# Patient Record
Sex: FEMALE | Race: WHITE | Employment: FULL TIME | URBAN - METROPOLITAN AREA
[De-identification: names, ages, dates, MRNs, and addresses within clinical notes are randomized per-mention and may not be internally consistent; named-entity substitution may affect disease eponyms.]

---

## 2017-09-24 ENCOUNTER — HOSPITAL ENCOUNTER (EMERGENCY)
Facility: CLINIC | Age: 32
Discharge: HOME | End: 2017-09-24
Attending: SPECIALIST
Payer: COMMERCIAL

## 2017-09-24 VITALS
WEIGHT: 208 LBS | BODY MASS INDEX: 38.28 KG/M2 | OXYGEN SATURATION: 97 % | HEART RATE: 91 BPM | HEIGHT: 62 IN | SYSTOLIC BLOOD PRESSURE: 125 MMHG | RESPIRATION RATE: 18 BRPM | DIASTOLIC BLOOD PRESSURE: 88 MMHG | TEMPERATURE: 98.3 F

## 2017-09-24 DIAGNOSIS — K02.9 DENTAL CARIES: Primary | ICD-10-CM

## 2017-09-24 DIAGNOSIS — K04.7 INFECTION OF TOOTH: ICD-10-CM

## 2017-09-24 PROCEDURE — 99282 EMERGENCY DEPT VISIT SF MDM: CPT

## 2017-09-24 RX ORDER — FLUCONAZOLE 150 MG/1
150 TABLET ORAL ONCE
Qty: 1 TABLET | Refills: 0 | Status: SHIPPED | OUTPATIENT
Start: 2017-09-24 | End: 2017-09-24

## 2017-09-24 RX ORDER — PENICILLIN V POTASSIUM 500 MG/1
500 TABLET ORAL 4 TIMES DAILY
Qty: 40 TABLET | Refills: 0 | Status: SHIPPED | OUTPATIENT
Start: 2017-09-24 | End: 2017-10-04

## 2017-09-24 RX ORDER — IBUPROFEN 600 MG/1
600 TABLET ORAL EVERY 6 HOURS PRN
Qty: 20 TABLET | Refills: 0 | Status: SHIPPED | OUTPATIENT
Start: 2017-09-24 | End: 2019-05-16

## 2017-09-24 ASSESSMENT — PAIN DESCRIPTION - DESCRIPTORS: DESCRIPTORS: SHARP

## 2017-09-24 ASSESSMENT — PAIN DESCRIPTION - ORIENTATION: ORIENTATION: RIGHT

## 2017-09-24 ASSESSMENT — PAIN DESCRIPTION - LOCATION: LOCATION: JAW

## 2017-09-24 ASSESSMENT — PAIN SCALES - GENERAL: PAINLEVEL_OUTOF10: 7

## 2017-09-24 ASSESSMENT — PAIN DESCRIPTION - FREQUENCY: FREQUENCY: CONTINUOUS

## 2018-05-10 ENCOUNTER — HOSPITAL ENCOUNTER (EMERGENCY)
Facility: CLINIC | Age: 33
Discharge: HOME | End: 2018-05-10
Attending: EMERGENCY MEDICINE
Payer: COMMERCIAL

## 2018-05-10 VITALS
DIASTOLIC BLOOD PRESSURE: 75 MMHG | SYSTOLIC BLOOD PRESSURE: 99 MMHG | TEMPERATURE: 98 F | BODY MASS INDEX: 38.09 KG/M2 | RESPIRATION RATE: 16 BRPM | OXYGEN SATURATION: 100 % | WEIGHT: 215 LBS | HEART RATE: 81 BPM | HEIGHT: 63 IN

## 2018-05-10 DIAGNOSIS — R10.2 PELVIC PAIN IN FEMALE: Primary | ICD-10-CM

## 2018-05-10 LAB
-: ABNORMAL
ABSOLUTE EOS #: 0.2 K/UL (ref 0–0.4)
ABSOLUTE IMMATURE GRANULOCYTE: ABNORMAL K/UL (ref 0–0.3)
ABSOLUTE LYMPH #: 2.9 K/UL (ref 1–4.8)
ABSOLUTE MONO #: 0.6 K/UL (ref 0.1–1.2)
AMORPHOUS: ABNORMAL
ANION GAP SERPL CALCULATED.3IONS-SCNC: 14 MMOL/L (ref 9–17)
BACTERIA: ABNORMAL
BASOPHILS # BLD: 1 % (ref 0–2)
BASOPHILS ABSOLUTE: 0.1 K/UL (ref 0–0.2)
BILIRUBIN URINE: NEGATIVE
BUN BLDV-MCNC: 9 MG/DL (ref 6–20)
BUN/CREAT BLD: NORMAL (ref 9–20)
CALCIUM SERPL-MCNC: 9.1 MG/DL (ref 8.6–10.4)
CASTS UA: ABNORMAL /LPF (ref 0–2)
CHLORIDE BLD-SCNC: 102 MMOL/L (ref 98–107)
CO2: 25 MMOL/L (ref 20–31)
COLOR: YELLOW
COMMENT UA: ABNORMAL
CREAT SERPL-MCNC: 0.6 MG/DL (ref 0.5–0.9)
CRYSTALS, UA: ABNORMAL /HPF
DIFFERENTIAL TYPE: ABNORMAL
EOSINOPHILS RELATIVE PERCENT: 2 % (ref 1–4)
EPITHELIAL CELLS UA: ABNORMAL /HPF (ref 0–5)
GFR AFRICAN AMERICAN: >60 ML/MIN
GFR NON-AFRICAN AMERICAN: >60 ML/MIN
GFR SERPL CREATININE-BSD FRML MDRD: NORMAL ML/MIN/{1.73_M2}
GFR SERPL CREATININE-BSD FRML MDRD: NORMAL ML/MIN/{1.73_M2}
GLUCOSE BLD-MCNC: 87 MG/DL (ref 70–99)
GLUCOSE URINE: NEGATIVE
HCG QUALITATIVE: NEGATIVE
HCT VFR BLD CALC: 36 % (ref 36–46)
HEMOGLOBIN: 11.7 G/DL (ref 12–16)
IMMATURE GRANULOCYTES: ABNORMAL %
KETONES, URINE: NEGATIVE
LEUKOCYTE ESTERASE, URINE: NEGATIVE
LYMPHOCYTES # BLD: 29 % (ref 24–44)
MCH RBC QN AUTO: 28.4 PG (ref 26–34)
MCHC RBC AUTO-ENTMCNC: 32.6 G/DL (ref 31–37)
MCV RBC AUTO: 87 FL (ref 80–100)
MONOCYTES # BLD: 6 % (ref 2–11)
MUCUS: ABNORMAL
NITRITE, URINE: NEGATIVE
NRBC AUTOMATED: ABNORMAL PER 100 WBC
OTHER OBSERVATIONS UA: ABNORMAL
PDW BLD-RTO: 13.7 % (ref 12.5–15.4)
PH UA: 7 (ref 5–8)
PLATELET # BLD: 386 K/UL (ref 140–450)
PLATELET ESTIMATE: ABNORMAL
PMV BLD AUTO: 8.3 FL (ref 6–12)
POTASSIUM SERPL-SCNC: 4.1 MMOL/L (ref 3.7–5.3)
PROTEIN UA: ABNORMAL
RBC # BLD: 4.14 M/UL (ref 4–5.2)
RBC # BLD: ABNORMAL 10*6/UL
RBC UA: ABNORMAL /HPF (ref 0–2)
RENAL EPITHELIAL, UA: ABNORMAL /HPF
SEG NEUTROPHILS: 62 % (ref 36–66)
SEGMENTED NEUTROPHILS ABSOLUTE COUNT: 6.1 K/UL (ref 1.8–7.7)
SODIUM BLD-SCNC: 141 MMOL/L (ref 135–144)
SPECIFIC GRAVITY UA: 1.02 (ref 1–1.03)
TRICHOMONAS: ABNORMAL
TURBIDITY: CLEAR
URINE HGB: ABNORMAL
UROBILINOGEN, URINE: NORMAL
WBC # BLD: 9.8 K/UL (ref 3.5–11)
WBC # BLD: ABNORMAL 10*3/UL
WBC UA: ABNORMAL /HPF (ref 0–5)
YEAST: ABNORMAL

## 2018-05-10 PROCEDURE — 96372 THER/PROPH/DIAG INJ SC/IM: CPT

## 2018-05-10 PROCEDURE — 6360000002 HC RX W HCPCS: Performed by: EMERGENCY MEDICINE

## 2018-05-10 PROCEDURE — 84703 CHORIONIC GONADOTROPIN ASSAY: CPT

## 2018-05-10 PROCEDURE — 80048 BASIC METABOLIC PNL TOTAL CA: CPT

## 2018-05-10 PROCEDURE — 99284 EMERGENCY DEPT VISIT MOD MDM: CPT

## 2018-05-10 PROCEDURE — 85025 COMPLETE CBC W/AUTO DIFF WBC: CPT

## 2018-05-10 PROCEDURE — 36415 COLL VENOUS BLD VENIPUNCTURE: CPT

## 2018-05-10 PROCEDURE — 81001 URINALYSIS AUTO W/SCOPE: CPT

## 2018-05-10 RX ORDER — GABAPENTIN 600 MG/1
600 TABLET ORAL 3 TIMES DAILY
COMMUNITY
End: 2018-09-19 | Stop reason: ALTCHOICE

## 2018-05-10 RX ORDER — KETOROLAC TROMETHAMINE 30 MG/ML
30 INJECTION, SOLUTION INTRAMUSCULAR; INTRAVENOUS ONCE
Status: COMPLETED | OUTPATIENT
Start: 2018-05-10 | End: 2018-05-10

## 2018-05-10 RX ORDER — MORPHINE SULFATE 10 MG/ML
10 INJECTION, SOLUTION INTRAMUSCULAR; INTRAVENOUS ONCE
Status: COMPLETED | OUTPATIENT
Start: 2018-05-10 | End: 2018-05-10

## 2018-05-10 RX ORDER — HYDROCODONE BITARTRATE AND ACETAMINOPHEN 5; 325 MG/1; MG/1
1 TABLET ORAL EVERY 6 HOURS PRN
Qty: 20 TABLET | Refills: 0 | Status: SHIPPED | OUTPATIENT
Start: 2018-05-10 | End: 2018-05-17

## 2018-05-10 RX ADMIN — KETOROLAC TROMETHAMINE 30 MG: 30 INJECTION, SOLUTION INTRAMUSCULAR at 17:57

## 2018-05-10 RX ADMIN — MORPHINE SULFATE 10 MG: 10 INJECTION, SOLUTION INTRAMUSCULAR; INTRAVENOUS at 18:39

## 2018-05-10 ASSESSMENT — PAIN DESCRIPTION - ORIENTATION
ORIENTATION: RIGHT;LEFT;LOWER

## 2018-05-10 ASSESSMENT — PAIN DESCRIPTION - LOCATION
LOCATION: ABDOMEN

## 2018-05-10 ASSESSMENT — PAIN DESCRIPTION - PAIN TYPE
TYPE: ACUTE PAIN

## 2018-05-10 ASSESSMENT — PAIN DESCRIPTION - DESCRIPTORS: DESCRIPTORS: CRAMPING

## 2018-05-10 ASSESSMENT — PAIN SCALES - GENERAL
PAINLEVEL_OUTOF10: 7
PAINLEVEL_OUTOF10: 9
PAINLEVEL_OUTOF10: 9
PAINLEVEL_OUTOF10: 8
PAINLEVEL_OUTOF10: 8

## 2018-07-26 ENCOUNTER — HOSPITAL ENCOUNTER (EMERGENCY)
Facility: CLINIC | Age: 33
Discharge: HOME | End: 2018-07-26
Attending: EMERGENCY MEDICINE
Payer: COMMERCIAL

## 2018-07-26 VITALS
RESPIRATION RATE: 18 BRPM | HEART RATE: 86 BPM | BODY MASS INDEX: 38.09 KG/M2 | OXYGEN SATURATION: 97 % | WEIGHT: 215 LBS | SYSTOLIC BLOOD PRESSURE: 143 MMHG | DIASTOLIC BLOOD PRESSURE: 97 MMHG | TEMPERATURE: 98.3 F

## 2018-07-26 DIAGNOSIS — N94.6 DYSMENORRHEA: ICD-10-CM

## 2018-07-26 DIAGNOSIS — N93.8 DYSFUNCTIONAL UTERINE BLEEDING: Primary | ICD-10-CM

## 2018-07-26 LAB
ABSOLUTE EOS #: 0.1 K/UL (ref 0–0.4)
ABSOLUTE IMMATURE GRANULOCYTE: ABNORMAL K/UL (ref 0–0.3)
ABSOLUTE LYMPH #: 2.5 K/UL (ref 1–4.8)
ABSOLUTE MONO #: 0.4 K/UL (ref 0.1–1.2)
ANION GAP SERPL CALCULATED.3IONS-SCNC: 12 MMOL/L (ref 9–17)
BASOPHILS # BLD: 0 % (ref 0–2)
BASOPHILS ABSOLUTE: 0 K/UL (ref 0–0.2)
BUN BLDV-MCNC: 14 MG/DL (ref 6–20)
BUN/CREAT BLD: ABNORMAL (ref 9–20)
CALCIUM SERPL-MCNC: 9.3 MG/DL (ref 8.6–10.4)
CHLORIDE BLD-SCNC: 103 MMOL/L (ref 98–107)
CO2: 25 MMOL/L (ref 20–31)
CREAT SERPL-MCNC: 0.6 MG/DL (ref 0.5–0.9)
DIFFERENTIAL TYPE: ABNORMAL
EOSINOPHILS RELATIVE PERCENT: 2 % (ref 1–4)
GFR AFRICAN AMERICAN: >60 ML/MIN
GFR NON-AFRICAN AMERICAN: >60 ML/MIN
GFR SERPL CREATININE-BSD FRML MDRD: ABNORMAL ML/MIN/{1.73_M2}
GFR SERPL CREATININE-BSD FRML MDRD: ABNORMAL ML/MIN/{1.73_M2}
GLUCOSE BLD-MCNC: 109 MG/DL (ref 70–99)
HCG QUALITATIVE: NEGATIVE
HCT VFR BLD CALC: 37.3 % (ref 36–46)
HEMOGLOBIN: 12.1 G/DL (ref 12–16)
IMMATURE GRANULOCYTES: ABNORMAL %
LYMPHOCYTES # BLD: 40 % (ref 24–44)
MCH RBC QN AUTO: 28.3 PG (ref 26–34)
MCHC RBC AUTO-ENTMCNC: 32.5 G/DL (ref 31–37)
MCV RBC AUTO: 87.1 FL (ref 80–100)
MONOCYTES # BLD: 7 % (ref 2–11)
NRBC AUTOMATED: ABNORMAL PER 100 WBC
PDW BLD-RTO: 15.5 % (ref 12.5–15.4)
PLATELET # BLD: 308 K/UL (ref 140–450)
PLATELET ESTIMATE: ABNORMAL
PMV BLD AUTO: 9.2 FL (ref 6–12)
POTASSIUM SERPL-SCNC: 4.1 MMOL/L (ref 3.7–5.3)
RBC # BLD: 4.28 M/UL (ref 4–5.2)
RBC # BLD: ABNORMAL 10*6/UL
SEG NEUTROPHILS: 51 % (ref 36–66)
SEGMENTED NEUTROPHILS ABSOLUTE COUNT: 3.3 K/UL (ref 1.8–7.7)
SODIUM BLD-SCNC: 140 MMOL/L (ref 135–144)
WBC # BLD: 6.4 K/UL (ref 3.5–11)
WBC # BLD: ABNORMAL 10*3/UL

## 2018-07-26 PROCEDURE — 96374 THER/PROPH/DIAG INJ IV PUSH: CPT

## 2018-07-26 PROCEDURE — 6360000002 HC RX W HCPCS: Performed by: EMERGENCY MEDICINE

## 2018-07-26 PROCEDURE — 84703 CHORIONIC GONADOTROPIN ASSAY: CPT

## 2018-07-26 PROCEDURE — 80048 BASIC METABOLIC PNL TOTAL CA: CPT

## 2018-07-26 PROCEDURE — 2580000003 HC RX 258: Performed by: EMERGENCY MEDICINE

## 2018-07-26 PROCEDURE — 36415 COLL VENOUS BLD VENIPUNCTURE: CPT

## 2018-07-26 PROCEDURE — 96375 TX/PRO/DX INJ NEW DRUG ADDON: CPT

## 2018-07-26 PROCEDURE — 85025 COMPLETE CBC W/AUTO DIFF WBC: CPT

## 2018-07-26 PROCEDURE — 99284 EMERGENCY DEPT VISIT MOD MDM: CPT

## 2018-07-26 PROCEDURE — 96361 HYDRATE IV INFUSION ADD-ON: CPT

## 2018-07-26 RX ORDER — KETOROLAC TROMETHAMINE 30 MG/ML
30 INJECTION, SOLUTION INTRAMUSCULAR; INTRAVENOUS ONCE
Status: COMPLETED | OUTPATIENT
Start: 2018-07-26 | End: 2018-07-26

## 2018-07-26 RX ORDER — HYDROCODONE BITARTRATE AND ACETAMINOPHEN 5; 325 MG/1; MG/1
1 TABLET ORAL EVERY 6 HOURS PRN
Qty: 10 TABLET | Refills: 0 | Status: SHIPPED | OUTPATIENT
Start: 2018-07-26 | End: 2018-08-02

## 2018-07-26 RX ORDER — 0.9 % SODIUM CHLORIDE 0.9 %
1000 INTRAVENOUS SOLUTION INTRAVENOUS ONCE
Status: COMPLETED | OUTPATIENT
Start: 2018-07-26 | End: 2018-07-26

## 2018-07-26 RX ORDER — ONDANSETRON 2 MG/ML
4 INJECTION INTRAMUSCULAR; INTRAVENOUS ONCE
Status: COMPLETED | OUTPATIENT
Start: 2018-07-26 | End: 2018-07-26

## 2018-07-26 RX ADMIN — ONDANSETRON 4 MG: 2 INJECTION INTRAMUSCULAR; INTRAVENOUS at 11:54

## 2018-07-26 RX ADMIN — KETOROLAC TROMETHAMINE 30 MG: 30 INJECTION, SOLUTION INTRAMUSCULAR at 11:54

## 2018-07-26 RX ADMIN — SODIUM CHLORIDE 1000 ML: 9 INJECTION, SOLUTION INTRAVENOUS at 11:54

## 2018-07-26 ASSESSMENT — PAIN SCALES - GENERAL
PAINLEVEL_OUTOF10: 10
PAINLEVEL_OUTOF10: 6
PAINLEVEL_OUTOF10: 10

## 2018-07-26 ASSESSMENT — ENCOUNTER SYMPTOMS
BLOOD IN STOOL: 0
ABDOMINAL PAIN: 0
BACK PAIN: 0
EYE PAIN: 0
NAUSEA: 0
DIARRHEA: 0
CONSTIPATION: 0
COUGH: 0
SHORTNESS OF BREATH: 0
VOMITING: 0

## 2018-07-26 ASSESSMENT — PAIN DESCRIPTION - LOCATION: LOCATION: ABDOMEN

## 2018-07-26 ASSESSMENT — PAIN DESCRIPTION - FREQUENCY: FREQUENCY: CONTINUOUS

## 2018-07-26 ASSESSMENT — PAIN DESCRIPTION - DESCRIPTORS: DESCRIPTORS: CRAMPING

## 2018-07-26 ASSESSMENT — PAIN DESCRIPTION - PROGRESSION: CLINICAL_PROGRESSION: GRADUALLY IMPROVING

## 2018-07-26 NOTE — ED NOTES
Pt presents to ED with complaint of menstrual cramping. States that she has been bleeding for approx 8 days and cramps getting worse each day. Pt reports that for the past year she has had \"unbearable\" menstrual cramps. She had an ablation in April but reports no relief from that. Pt has had 2 episodes of vomiting since last night due to the pain.  Pt tried to call OB/GYN but states that doctor unavailable     Yen Agrawal RN  07/26/18 1652

## 2018-07-26 NOTE — ED PROVIDER NOTES
Effort normal and breath sounds normal.   Abdominal: Soft. Bowel sounds are normal. There is tenderness. Diffuse tenderness but no peritoneal signs most tenderness in suprapubic region   Musculoskeletal: She exhibits no edema or tenderness. Neurological: She is alert and oriented to person, place, and time. Skin: Skin is warm and dry. She is not diaphoretic. Psychiatric: She has a normal mood and affect.  Her behavior is normal.       DIFFERENTIAL DIAGNOSIS/ MDM:     Dysmenorrhea acute exacerbation of chronic complaints we'll treat with some fluids pain medication check baseline labs  Patient's R he had a workup for this  DIAGNOSTIC RESULTS     EKG: All EKG's are interpreted by the Emergency Department Physician who either signs or Co-signs this chart in the absence of a cardiologist.        RADIOLOGY:   Non-plain film images such as CT, Ultrasound and MRI are read by the radiologist. Plain radiographic images are visualized and the radiologist interpretations are reviewed as follows:         LABS:  Results for orders placed or performed during the hospital encounter of 59/45/38   Basic Metabolic Panel   Result Value Ref Range    Glucose 109 (H) 70 - 99 mg/dL    BUN 14 6 - 20 mg/dL    CREATININE 0.60 0.50 - 0.90 mg/dL    Bun/Cre Ratio NOT REPORTED 9 - 20    Calcium 9.3 8.6 - 10.4 mg/dL    Sodium 140 135 - 144 mmol/L    Potassium 4.1 3.7 - 5.3 mmol/L    Chloride 103 98 - 107 mmol/L    CO2 25 20 - 31 mmol/L    Anion Gap 12 9 - 17 mmol/L    GFR Non-African American >60 >60 mL/min    GFR African American >60 >60 mL/min    GFR Comment          GFR Staging NOT REPORTED    CBC Auto Differential   Result Value Ref Range    WBC 6.4 3.5 - 11.0 k/uL    RBC 4.28 4.0 - 5.2 m/uL    Hemoglobin 12.1 12.0 - 16.0 g/dL    Hematocrit 37.3 36 - 46 %    MCV 87.1 80 - 100 fL    MCH 28.3 26 - 34 pg    MCHC 32.5 31 - 37 g/dL    RDW 15.5 (H) 12.5 - 15.4 %    Platelets 781 892 - 258 k/uL    MPV 9.2 6.0 - 12.0 fL    NRBC Automated NOT

## 2018-09-19 ENCOUNTER — HOSPITAL ENCOUNTER (EMERGENCY)
Age: 33
Discharge: HOME | End: 2018-09-19
Attending: EMERGENCY MEDICINE
Payer: COMMERCIAL

## 2018-09-19 VITALS
SYSTOLIC BLOOD PRESSURE: 112 MMHG | TEMPERATURE: 100.6 F | OXYGEN SATURATION: 97 % | HEART RATE: 116 BPM | RESPIRATION RATE: 14 BRPM | DIASTOLIC BLOOD PRESSURE: 94 MMHG

## 2018-09-19 DIAGNOSIS — J01.90 ACUTE SINUSITIS, RECURRENCE NOT SPECIFIED, UNSPECIFIED LOCATION: Primary | ICD-10-CM

## 2018-09-19 PROCEDURE — 99283 EMERGENCY DEPT VISIT LOW MDM: CPT

## 2018-09-19 PROCEDURE — 6360000002 HC RX W HCPCS: Performed by: EMERGENCY MEDICINE

## 2018-09-19 PROCEDURE — 96372 THER/PROPH/DIAG INJ SC/IM: CPT

## 2018-09-19 PROCEDURE — 81003 URINALYSIS AUTO W/O SCOPE: CPT

## 2018-09-19 PROCEDURE — 6370000000 HC RX 637 (ALT 250 FOR IP): Performed by: EMERGENCY MEDICINE

## 2018-09-19 RX ORDER — BENZONATATE 100 MG/1
100 CAPSULE ORAL EVERY 8 HOURS PRN
Qty: 20 CAPSULE | Refills: 0 | Status: SHIPPED | OUTPATIENT
Start: 2018-09-19 | End: 2018-11-18

## 2018-09-19 RX ORDER — ONDANSETRON 4 MG/1
4 TABLET, ORALLY DISINTEGRATING ORAL EVERY 6 HOURS PRN
Qty: 12 TABLET | Refills: 0 | Status: SHIPPED | OUTPATIENT
Start: 2018-09-19 | End: 2019-05-16

## 2018-09-19 RX ORDER — SULFAMETHOXAZOLE AND TRIMETHOPRIM 800; 160 MG/1; MG/1
1 TABLET ORAL 2 TIMES DAILY
Qty: 20 TABLET | Refills: 0 | Status: SHIPPED | OUTPATIENT
Start: 2018-09-19 | End: 2018-09-29

## 2018-09-19 RX ORDER — KETOROLAC TROMETHAMINE 30 MG/ML
60 INJECTION, SOLUTION INTRAMUSCULAR; INTRAVENOUS ONCE
Status: COMPLETED | OUTPATIENT
Start: 2018-09-19 | End: 2018-09-19

## 2018-09-19 RX ORDER — ONDANSETRON 4 MG/1
4 TABLET, ORALLY DISINTEGRATING ORAL ONCE
Status: COMPLETED | OUTPATIENT
Start: 2018-09-19 | End: 2018-09-19

## 2018-09-19 RX ORDER — ACETAMINOPHEN 325 MG/1
650 TABLET ORAL ONCE
Status: COMPLETED | OUTPATIENT
Start: 2018-09-19 | End: 2018-09-19

## 2018-09-19 RX ADMIN — ONDANSETRON 4 MG: 4 TABLET, ORALLY DISINTEGRATING ORAL at 12:11

## 2018-09-19 RX ADMIN — ACETAMINOPHEN 650 MG: 325 TABLET ORAL at 12:37

## 2018-09-19 RX ADMIN — KETOROLAC TROMETHAMINE 60 MG: 30 INJECTION, SOLUTION INTRAMUSCULAR at 12:11

## 2018-09-19 ASSESSMENT — PAIN DESCRIPTION - DESCRIPTORS: DESCRIPTORS: ACHING

## 2018-09-19 ASSESSMENT — ENCOUNTER SYMPTOMS
ABDOMINAL PAIN: 0
SORE THROAT: 1
EYE REDNESS: 0
NAUSEA: 1
SHORTNESS OF BREATH: 0
SINUS PRESSURE: 1
FACIAL SWELLING: 0
COLOR CHANGE: 0
COUGH: 0
VOMITING: 1
CONSTIPATION: 0
DIARRHEA: 0
EYE DISCHARGE: 0

## 2018-09-19 ASSESSMENT — PAIN DESCRIPTION - PAIN TYPE: TYPE: ACUTE PAIN

## 2018-09-19 ASSESSMENT — PAIN DESCRIPTION - LOCATION: LOCATION: HEAD;CHEST

## 2018-09-19 ASSESSMENT — PAIN SCALES - GENERAL
PAINLEVEL_OUTOF10: 8
PAINLEVEL_OUTOF10: 6

## 2018-09-19 ASSESSMENT — PAIN DESCRIPTION - FREQUENCY: FREQUENCY: CONTINUOUS

## 2018-09-19 ASSESSMENT — PAIN DESCRIPTION - PROGRESSION: CLINICAL_PROGRESSION: GRADUALLY WORSENING

## 2018-09-19 NOTE — ED PROVIDER NOTES
and fatigue. HENT: Positive for sinus pressure and sore throat. Negative for congestion, ear discharge and facial swelling. Eyes: Negative for discharge and redness. Respiratory: Negative for cough and shortness of breath. Cardiovascular: Negative for chest pain. Gastrointestinal: Positive for nausea and vomiting. Negative for abdominal pain, constipation and diarrhea. Genitourinary: Negative for dysuria and hematuria. Musculoskeletal: Negative for arthralgias. Skin: Negative for color change and rash. Neurological: Negative for syncope, numbness and headaches. Hematological: Negative for adenopathy. Psychiatric/Behavioral: Negative for confusion. The patient is not nervous/anxious. Except as noted above the remainder of the review of systems was reviewed and negative. PHYSICAL EXAM    (up to 7 for level 4, 8 or more for level 5)     There were no vitals filed for this visit. Physical Exam   Constitutional: She is oriented to person, place, and time. She appears well-developed and well-nourished. No distress. HENT:   Head: Normocephalic and atraumatic. Mouth/Throat: No oropharyngeal exudate. Eyes: Right eye exhibits no discharge. Left eye exhibits no discharge. No scleral icterus. Neck: Neck supple. Cardiovascular: Normal rate and regular rhythm. Pulmonary/Chest: Effort normal and breath sounds normal. No stridor. No respiratory distress. She has no wheezes. She has no rales. Abdominal: Soft. She exhibits no distension. There is no tenderness. Musculoskeletal: Normal range of motion. Lymphadenopathy:     She has no cervical adenopathy. Neurological: She is alert and oriented to person, place, and time. Skin: Skin is warm and dry. No rash noted. She is not diaphoretic. No erythema. Psychiatric: She has a normal mood and affect. Her behavior is normal.   Vitals reviewed.         DIAGNOSTIC RESULTS     EKG: All EKG's are interpreted by the Emergency Department Physician who either signs or Co-signs this chart in the absence of a cardiologist.    Not indicated    RADIOLOGY:   Non-plain film images such as CT, Ultrasound and MRI are read by the radiologist. Plain radiographic images are visualized and preliminarily interpreted by the emergency physician with the below findings:    Not indicated    Interpretation per the Radiologist below, if available at the time of this note:        LABS:  Labs Reviewed - No data to display    All other labs were within normal range or not returned as of this dictation. EMERGENCY DEPARTMENT COURSE and DIFFERENTIAL DIAGNOSIS/MDM:   Vitals: There were no vitals filed for this visit. Orders Placed This Encounter   Medications    ketorolac (TORADOL) injection 60 mg    ondansetron (ZOFRAN-ODT) disintegrating tablet 4 mg    sulfamethoxazole-trimethoprim (BACTRIM DS) 800-160 MG per tablet     Sig: Take 1 tablet by mouth 2 times daily for 10 days     Dispense:  20 tablet     Refill:  0    ondansetron (ZOFRAN ODT) 4 MG disintegrating tablet     Sig: Take 1 tablet by mouth every 6 hours as needed for Nausea or Vomiting     Dispense:  12 tablet     Refill:  0    benzonatate (TESSALON PERLES) 100 MG capsule     Sig: Take 1 capsule by mouth every 8 hours as needed for Cough     Dispense:  20 capsule     Refill:  0       Medical Decision Making: The patient will be treated for sinus infection. Treatment diagnosis and follow-up were discussed with the patient. CONSULTS:  None    PROCEDURES:  None    FINAL IMPRESSION      1.  Acute sinusitis, recurrence not specified, unspecified location          DISPOSITION/PLAN   DISPOSITION Decision To Discharge 09/19/2018 11:40:05 AM      PATIENT REFERRED TO:   [facility] ED  [address] [facility]  [phone]    If symptoms worsen      DISCHARGE MEDICATIONS:     New Prescriptions    BENZONATATE (TESSALON PERLES) 100 MG CAPSULE    Take 1 capsule by mouth every 8 hours as needed for Cough    ONDANSETRON (ZOFRAN ODT) 4 MG DISINTEGRATING TABLET    Take 1 tablet by mouth every 6 hours as needed for Nausea or Vomiting    SULFAMETHOXAZOLE-TRIMETHOPRIM (BACTRIM DS) 800-160 MG PER TABLET    Take 1 tablet by mouth 2 times daily for 10 days         (Please note that portions of this note were completed with a voice recognition program.  Efforts were made to edit the dictations but occasionally words are mis-transcribed.)    Nevin Nino MD  Attending Emergency Physician              Nevin Nino MD  09/19/18 2408

## 2018-09-19 NOTE — ED NOTES
Patient still experiences chills , headache and back discomfort .         Syed Akers RN  09/19/18 4261

## 2018-11-18 ENCOUNTER — HOSPITAL ENCOUNTER (EMERGENCY)
Facility: CLINIC | Age: 33
Discharge: HOME | End: 2018-11-18
Attending: SPECIALIST
Payer: COMMERCIAL

## 2018-11-18 VITALS
TEMPERATURE: 98.1 F | WEIGHT: 236 LBS | DIASTOLIC BLOOD PRESSURE: 87 MMHG | BODY MASS INDEX: 41.82 KG/M2 | OXYGEN SATURATION: 100 % | SYSTOLIC BLOOD PRESSURE: 152 MMHG | HEIGHT: 63 IN | HEART RATE: 80 BPM | RESPIRATION RATE: 16 BRPM

## 2018-11-18 DIAGNOSIS — S16.1XXA STRAIN OF CERVICAL PORTION OF LEFT TRAPEZIUS MUSCLE: Primary | ICD-10-CM

## 2018-11-18 PROCEDURE — 99283 EMERGENCY DEPT VISIT LOW MDM: CPT

## 2018-11-18 RX ORDER — NAPROXEN 500 MG/1
500 TABLET ORAL 2 TIMES DAILY
Qty: 12 TABLET | Refills: 0 | Status: SHIPPED | OUTPATIENT
Start: 2018-11-18 | End: 2022-04-15

## 2018-11-18 RX ORDER — CYCLOBENZAPRINE HCL 10 MG
10 TABLET ORAL 3 TIMES DAILY PRN
Qty: 15 TABLET | Refills: 0 | Status: SHIPPED | OUTPATIENT
Start: 2018-11-18 | End: 2018-11-28

## 2018-11-18 RX ORDER — PREDNISONE 20 MG/1
20 TABLET ORAL 2 TIMES DAILY
Qty: 10 TABLET | Refills: 0 | Status: SHIPPED | OUTPATIENT
Start: 2018-11-18 | End: 2018-11-23

## 2018-11-18 ASSESSMENT — ENCOUNTER SYMPTOMS
SHORTNESS OF BREATH: 0
BACK PAIN: 0
ABDOMINAL PAIN: 0
COUGH: 0

## 2018-11-18 ASSESSMENT — PAIN DESCRIPTION - ORIENTATION
ORIENTATION: LEFT
ORIENTATION: LEFT

## 2018-11-18 ASSESSMENT — PAIN SCALES - GENERAL
PAINLEVEL_OUTOF10: 8
PAINLEVEL_OUTOF10: 9

## 2018-11-18 ASSESSMENT — PAIN DESCRIPTION - PAIN TYPE
TYPE: ACUTE PAIN
TYPE: ACUTE PAIN

## 2018-11-18 ASSESSMENT — PAIN DESCRIPTION - FREQUENCY: FREQUENCY: CONTINUOUS

## 2018-11-18 ASSESSMENT — PAIN DESCRIPTION - ONSET: ONSET: SUDDEN

## 2018-11-18 ASSESSMENT — PAIN DESCRIPTION - LOCATION
LOCATION: SHOULDER;NECK
LOCATION: NECK;SHOULDER

## 2018-11-18 ASSESSMENT — PAIN DESCRIPTION - PROGRESSION: CLINICAL_PROGRESSION: GRADUALLY WORSENING

## 2018-11-18 ASSESSMENT — PAIN DESCRIPTION - DESCRIPTORS: DESCRIPTORS: STABBING;TIGHTNESS

## 2018-11-18 NOTE — ED PROVIDER NOTES
Efforts were made to edit the dictations but occasionally words are mis-transcribed.)    Cohen MD, F.A.C.E.P.   Attending Emergency Physician        Freya Castillo MD  11/18/18 4140

## 2018-11-18 NOTE — ED NOTES
BP discussed, could be pain related.  Ask to monitor BP if possible and F/U     Missie Eisenmenger, RN  11/18/18 2141

## 2019-05-16 ENCOUNTER — APPOINTMENT (OUTPATIENT)
Dept: ULTRASOUND IMAGING | Facility: CLINIC | Age: 34
End: 2019-05-16

## 2019-05-16 ENCOUNTER — HOSPITAL ENCOUNTER (EMERGENCY)
Facility: CLINIC | Age: 34
Discharge: HOME | End: 2019-05-16
Attending: EMERGENCY MEDICINE

## 2019-05-16 ENCOUNTER — APPOINTMENT (OUTPATIENT)
Dept: CT IMAGING | Facility: CLINIC | Age: 34
End: 2019-05-16

## 2019-05-16 VITALS
HEART RATE: 75 BPM | OXYGEN SATURATION: 100 % | TEMPERATURE: 98.1 F | SYSTOLIC BLOOD PRESSURE: 118 MMHG | RESPIRATION RATE: 16 BRPM | HEIGHT: 63 IN | WEIGHT: 200 LBS | DIASTOLIC BLOOD PRESSURE: 79 MMHG | BODY MASS INDEX: 35.44 KG/M2

## 2019-05-16 DIAGNOSIS — N83.201 HEMORRHAGIC CYST OF RIGHT OVARY: Primary | ICD-10-CM

## 2019-05-16 DIAGNOSIS — R30.0 DYSURIA: ICD-10-CM

## 2019-05-16 LAB
-: ABNORMAL
ABSOLUTE EOS #: 0.2 K/UL (ref 0–0.4)
ABSOLUTE IMMATURE GRANULOCYTE: NORMAL K/UL (ref 0–0.3)
ABSOLUTE LYMPH #: 3 K/UL (ref 1–4.8)
ABSOLUTE MONO #: 0.5 K/UL (ref 0.1–1.2)
ALBUMIN SERPL-MCNC: 4.3 G/DL (ref 3.5–5.2)
ALBUMIN/GLOBULIN RATIO: 1.3 (ref 1–2.5)
ALP BLD-CCNC: 93 U/L (ref 35–104)
ALT SERPL-CCNC: 32 U/L (ref 5–33)
AMORPHOUS: ABNORMAL
ANION GAP SERPL CALCULATED.3IONS-SCNC: 13 MMOL/L (ref 9–17)
AST SERPL-CCNC: 26 U/L
BACTERIA: ABNORMAL
BASOPHILS # BLD: 0 % (ref 0–2)
BASOPHILS ABSOLUTE: 0 K/UL (ref 0–0.2)
BILIRUB SERPL-MCNC: 1.1 MG/DL (ref 0.3–1.2)
BILIRUBIN URINE: NEGATIVE
BUN BLDV-MCNC: 16 MG/DL (ref 6–20)
BUN/CREAT BLD: NORMAL (ref 9–20)
CALCIUM SERPL-MCNC: 9.4 MG/DL (ref 8.6–10.4)
CASTS UA: ABNORMAL /LPF (ref 0–2)
CHLORIDE BLD-SCNC: 102 MMOL/L (ref 98–107)
CO2: 23 MMOL/L (ref 20–31)
COLOR: YELLOW
COMMENT UA: ABNORMAL
CREAT SERPL-MCNC: 0.6 MG/DL (ref 0.5–0.9)
CRYSTALS, UA: ABNORMAL /HPF
DIFFERENTIAL TYPE: NORMAL
EOSINOPHILS RELATIVE PERCENT: 2 % (ref 1–4)
EPITHELIAL CELLS UA: ABNORMAL /HPF (ref 0–5)
GFR AFRICAN AMERICAN: >60 ML/MIN
GFR NON-AFRICAN AMERICAN: >60 ML/MIN
GFR SERPL CREATININE-BSD FRML MDRD: NORMAL ML/MIN/{1.73_M2}
GFR SERPL CREATININE-BSD FRML MDRD: NORMAL ML/MIN/{1.73_M2}
GLUCOSE BLD-MCNC: 97 MG/DL (ref 70–99)
GLUCOSE URINE: NEGATIVE
HCG QUALITATIVE: NEGATIVE
HCT VFR BLD CALC: 43 % (ref 36–46)
HEMOGLOBIN: 14.4 G/DL (ref 12–16)
IMMATURE GRANULOCYTES: NORMAL %
KETONES, URINE: NEGATIVE
LEUKOCYTE ESTERASE, URINE: NEGATIVE
LIPASE: 33 U/L (ref 13–60)
LYMPHOCYTES # BLD: 33 % (ref 24–44)
MCH RBC QN AUTO: 31.4 PG (ref 26–34)
MCHC RBC AUTO-ENTMCNC: 33.4 G/DL (ref 31–37)
MCV RBC AUTO: 94 FL (ref 80–100)
MONOCYTES # BLD: 6 % (ref 2–11)
MUCUS: ABNORMAL
NITRITE, URINE: NEGATIVE
NRBC AUTOMATED: NORMAL PER 100 WBC
OTHER OBSERVATIONS UA: ABNORMAL
PDW BLD-RTO: 13.5 % (ref 12.5–15.4)
PH UA: 7.5 (ref 5–8)
PLATELET # BLD: 284 K/UL (ref 140–450)
PLATELET ESTIMATE: NORMAL
PMV BLD AUTO: 9.4 FL (ref 6–12)
POTASSIUM SERPL-SCNC: 4.4 MMOL/L (ref 3.7–5.3)
PROTEIN UA: NEGATIVE
RBC # BLD: 4.58 M/UL (ref 4–5.2)
RBC # BLD: NORMAL 10*6/UL
RBC UA: ABNORMAL /HPF (ref 0–2)
RENAL EPITHELIAL, UA: ABNORMAL /HPF
SEG NEUTROPHILS: 59 % (ref 36–66)
SEGMENTED NEUTROPHILS ABSOLUTE COUNT: 5.3 K/UL (ref 1.8–7.7)
SODIUM BLD-SCNC: 138 MMOL/L (ref 135–144)
SPECIFIC GRAVITY UA: 1.01 (ref 1–1.03)
TOTAL PROTEIN: 7.6 G/DL (ref 6.4–8.3)
TRICHOMONAS: ABNORMAL
TURBIDITY: CLEAR
URINE HGB: NEGATIVE
UROBILINOGEN, URINE: ABNORMAL
WBC # BLD: 9 K/UL (ref 3.5–11)
WBC # BLD: NORMAL 10*3/UL
WBC UA: ABNORMAL /HPF (ref 0–5)
YEAST: ABNORMAL

## 2019-05-16 PROCEDURE — 83690 ASSAY OF LIPASE: CPT

## 2019-05-16 PROCEDURE — 80053 COMPREHEN METABOLIC PANEL: CPT

## 2019-05-16 PROCEDURE — 2580000003 HC RX 258: Performed by: EMERGENCY MEDICINE

## 2019-05-16 PROCEDURE — 99284 EMERGENCY DEPT VISIT MOD MDM: CPT

## 2019-05-16 PROCEDURE — 81001 URINALYSIS AUTO W/SCOPE: CPT

## 2019-05-16 PROCEDURE — 85025 COMPLETE CBC W/AUTO DIFF WBC: CPT

## 2019-05-16 PROCEDURE — 84703 CHORIONIC GONADOTROPIN ASSAY: CPT

## 2019-05-16 PROCEDURE — 6360000002 HC RX W HCPCS: Performed by: EMERGENCY MEDICINE

## 2019-05-16 PROCEDURE — 76830 TRANSVAGINAL US NON-OB: CPT

## 2019-05-16 PROCEDURE — 36415 COLL VENOUS BLD VENIPUNCTURE: CPT

## 2019-05-16 PROCEDURE — 76857 US EXAM PELVIC LIMITED: CPT

## 2019-05-16 PROCEDURE — 74176 CT ABD & PELVIS W/O CONTRAST: CPT

## 2019-05-16 PROCEDURE — 93976 VASCULAR STUDY: CPT

## 2019-05-16 PROCEDURE — 87086 URINE CULTURE/COLONY COUNT: CPT

## 2019-05-16 PROCEDURE — 96375 TX/PRO/DX INJ NEW DRUG ADDON: CPT

## 2019-05-16 PROCEDURE — 96374 THER/PROPH/DIAG INJ IV PUSH: CPT

## 2019-05-16 RX ORDER — IBUPROFEN 800 MG/1
800 TABLET ORAL EVERY 8 HOURS PRN
Qty: 30 TABLET | Refills: 0 | Status: SHIPPED | OUTPATIENT
Start: 2019-05-16 | End: 2022-04-15

## 2019-05-16 RX ORDER — FLUCONAZOLE 150 MG/1
150 TABLET ORAL ONCE
Qty: 1 TABLET | Refills: 0 | Status: SHIPPED | OUTPATIENT
Start: 2019-05-16 | End: 2019-05-16

## 2019-05-16 RX ORDER — KETOROLAC TROMETHAMINE 30 MG/ML
30 INJECTION, SOLUTION INTRAMUSCULAR; INTRAVENOUS ONCE
Status: COMPLETED | OUTPATIENT
Start: 2019-05-16 | End: 2019-05-16

## 2019-05-16 RX ORDER — ONDANSETRON 4 MG/1
4 TABLET, ORALLY DISINTEGRATING ORAL EVERY 8 HOURS PRN
Qty: 20 TABLET | Refills: 0 | Status: SHIPPED | OUTPATIENT
Start: 2019-05-16 | End: 2020-03-01 | Stop reason: SDUPTHER

## 2019-05-16 RX ORDER — ONDANSETRON 2 MG/ML
4 INJECTION INTRAMUSCULAR; INTRAVENOUS ONCE
Status: COMPLETED | OUTPATIENT
Start: 2019-05-16 | End: 2019-05-16

## 2019-05-16 RX ORDER — NITROFURANTOIN 25; 75 MG/1; MG/1
100 CAPSULE ORAL 2 TIMES DAILY
Qty: 10 CAPSULE | Refills: 0 | Status: SHIPPED | OUTPATIENT
Start: 2019-05-16 | End: 2019-05-21

## 2019-05-16 RX ORDER — 0.9 % SODIUM CHLORIDE 0.9 %
1000 INTRAVENOUS SOLUTION INTRAVENOUS ONCE
Status: COMPLETED | OUTPATIENT
Start: 2019-05-16 | End: 2019-05-16

## 2019-05-16 RX ADMIN — KETOROLAC TROMETHAMINE 30 MG: 30 INJECTION, SOLUTION INTRAMUSCULAR at 08:12

## 2019-05-16 RX ADMIN — ONDANSETRON 4 MG: 2 INJECTION INTRAMUSCULAR; INTRAVENOUS at 08:12

## 2019-05-16 RX ADMIN — SODIUM CHLORIDE 1000 ML: 9 INJECTION, SOLUTION INTRAVENOUS at 08:12

## 2019-05-16 ASSESSMENT — PAIN DESCRIPTION - DESCRIPTORS
DESCRIPTORS: STABBING
DESCRIPTORS: STABBING

## 2019-05-16 ASSESSMENT — PAIN DESCRIPTION - PROGRESSION
CLINICAL_PROGRESSION: GRADUALLY WORSENING
CLINICAL_PROGRESSION: NOT CHANGED

## 2019-05-16 ASSESSMENT — PAIN - FUNCTIONAL ASSESSMENT: PAIN_FUNCTIONAL_ASSESSMENT: PREVENTS OR INTERFERES WITH MANY ACTIVE NOT PASSIVE ACTIVITIES

## 2019-05-16 ASSESSMENT — ENCOUNTER SYMPTOMS
VOMITING: 1
BACK PAIN: 0
SHORTNESS OF BREATH: 0
DIARRHEA: 0
EYE PAIN: 0
NAUSEA: 1
BLOOD IN STOOL: 0
COUGH: 0
ABDOMINAL PAIN: 0
CONSTIPATION: 0

## 2019-05-16 ASSESSMENT — PAIN DESCRIPTION - FREQUENCY
FREQUENCY: CONTINUOUS
FREQUENCY: CONTINUOUS

## 2019-05-16 ASSESSMENT — PAIN DESCRIPTION - ORIENTATION
ORIENTATION: LOWER
ORIENTATION: RIGHT;LOWER

## 2019-05-16 ASSESSMENT — PAIN DESCRIPTION - PAIN TYPE
TYPE: ACUTE PAIN
TYPE: ACUTE PAIN

## 2019-05-16 ASSESSMENT — PAIN SCALES - GENERAL
PAINLEVEL_OUTOF10: 7
PAINLEVEL_OUTOF10: 6

## 2019-05-16 ASSESSMENT — PAIN DESCRIPTION - LOCATION
LOCATION: ABDOMEN
LOCATION: ABDOMEN

## 2019-05-16 ASSESSMENT — PAIN DESCRIPTION - ONSET
ONSET: AWAKENED FROM SLEEP
ONSET: PROGRESSIVE

## 2019-05-16 NOTE — ED PROVIDER NOTES
[facility]  [address] [address]  Phone: [phone]        Pt Name: Olga Yip  MRN: [id number]  [place] 1985  Date of evaluation: 5/16/19      CHIEF COMPLAINT       Chief Complaint   Patient presents with    Flank Pain    Dysuria    Abdominal Pain    Nausea         HISTORY OF PRESENT ILLNESS    Olga Yip is a 35 y.o. female who presents with chief complaint of right flank pain she says it's been going on for a couple days she's had some dysuria associated with a little bit of nausea and vomiting no fevers or chills no vaginal bleeding she's had a history of a partial hysterectomy she's also had history of ovarian cysts movement makes the pain worse and nothing else seems to help     She does have a history of kidney stones in the past  REVIEW OF SYSTEMS         Review of Systems   Constitutional: Negative for chills and fever. HENT: Negative for congestion and ear pain. Eyes: Negative for pain and visual disturbance. Respiratory: Negative for cough and shortness of breath. Cardiovascular: Negative for chest pain, palpitations and leg swelling. Gastrointestinal: Positive for nausea and vomiting. Negative for abdominal pain, blood in stool, constipation and diarrhea. Endocrine: Negative for polydipsia and polyuria. Genitourinary: Positive for dysuria and flank pain. Negative for difficulty urinating, frequency, vaginal bleeding and vaginal discharge. Musculoskeletal: Negative for back pain, joint swelling, myalgias, neck pain and neck stiffness. Skin: Negative for rash. Neurological: Negative for dizziness, weakness and headaches. Hematological: Negative for adenopathy. Does not bruise/bleed easily. Psychiatric/Behavioral: Negative for confusion, self-injury and suicidal ideas. PAST MEDICAL HISTORY    has a past medical history of Back pain, Kidney infection, Kidney stone, and Migraines.     SURGICAL HISTORY      has a past surgical history that includes Tubal ligation; Dilation and curettage of uterus; Endometrial ablation; and Hysterectomy. CURRENT MEDICATIONS       Previous Medications    ACETAMINOPHEN (TYLENOL) 500 MG TABLET    Take 1,000 mg by mouth every 6 hours as needed for Pain. CYANOCOBALAMIN (VITAMIN B 12 PO)    Take by mouth 2 times daily    IBUPROFEN (ADVIL;MOTRIN) 800 MG TABLET    Take 1 tablet by mouth every 8 hours as needed for Pain. NAPROXEN (NAPROSYN) 500 MG TABLET    Take 1 tablet by mouth 2 times daily       ALLERGIES     is allergic to latex; imitrex [sumatriptan]; pcn [penicillins]; zoloft [sertraline hcl]; and zomig [zolmitriptan]. FAMILY HISTORY     has no family status information on file. family history is not on file. SOCIAL HISTORY      reports that she has never smoked. She has never used smokeless tobacco. She reports that she does not drink alcohol or use drugs. PHYSICAL EXAM     INITIAL VITALS:  height is 5' 3\" (1.6 m) and weight is 90.7 kg (200 lb). Her oral temperature is 98.1 °F (36.7 °C). Her blood pressure is 118/79 and her pulse is 75. Her respiration is 16 and oxygen saturation is 100%. Physical Exam      DIFFERENTIAL DIAGNOSIS/ MDM:     Abdominal pain rule out urinary tract infection rule out appendicitis or ovarian pathology    DIAGNOSTIC RESULTS     EKG: All EKG's are interpreted by the Emergency Department Physician who either signs or Co-signs this chart in the absence of a cardiologist.        RADIOLOGY:   Non-plain film images such as CT, Ultrasound and MRI are read by the radiologist. Plain radiographic images are visualized and the radiologist interpretations are reviewed as follows:      EXAMINATION:   CT OF THE ABDOMEN AND PELVIS WITHOUT CONTRAST 5/16/2019 8:27 am       TECHNIQUE:   CT of the abdomen and pelvis was performed without the administration of   intravenous contrast. Multiplanar reformatted images are provided for review.    Dose modulation, iterative reconstruction, and/or weight based adjustment of   the mA/kV was utilized to reduce the radiation dose to as low as reasonably   achievable.       COMPARISON:   July 3, 2016       HISTORY:   ORDERING SYSTEM PROVIDED HISTORY: Right flank pain history of kidney stones,   history of partial hysterectomy   TECHNOLOGIST PROVIDED HISTORY:   WITHOUT Contrast   Ordering Physician Provided Reason for Exam: Right flank pain history of   kidney stones, history of partial hysterectomy   Acuity: Acute   Type of Exam: Initial       FINDINGS:   Lower Chest: There is no evidence of fusion, infiltrate, pneumothorax.  There   is stable appearance to the tiny right basilar anterior nodule when compared   to previous.       Organs: The liver, gallbladder, pancreas, spleen show no gross abnormality. Bilateral kidneys show no evidence of obstruction.  There are no renal   calculi.       GI/Bowel: Stomach, small bowel appear normal without evidence of   obstruction.: Is filled with stool with no evidence of diverticulitis.  A few   scattered diverticuli are seen.  The rectosigmoid colon shows no abnormality.       Pelvis: In the interval between CT scan is the patient has undergone a   hysterectomy. Kiron Plump is a 4 cm x 5 cm mass in the right pelvis which is most   likely an ovary.  Hounsfield units average is 17.  This ovary is larger than   when compared to previous CT scan.  This may contain a cyst.  Bladder shows   no gross abnormality.  Multiple phleboliths are seen in the pelvis.  The left   ovary is not identified.       Peritoneum/Retroperitoneum: The abdominal aorta and IVC show no gross   abnormality.  No retroperitoneal adenopathy is appreciated.       Bones/Soft Tissues: Visualized bony structures show no gross abnormality.    The continues to be a tiny fat containing and bili cul hernia similar to   previous.           Impression   Patient is status post hysterectomy. Geovanny Plump is a 5 cm x 4 cm mass in the   right pelvis which I suspect is the right ovary.  Hounsfield units are   indeterminate for cyst.  This ovary appears larger than on previous CT scan. Correlation with pelvic ultrasound would be suggested to better characterize   the suspected ovary.       There is no evidence of renal calculi or renal obstruction.  No other acute   findings of the abdomen and pelvis seen.       RECOMMENDATIONS:   Pelvic ultrasound. EXAMINATION:   PELVIC ULTRASOUND       5/16/2019       TECHNIQUE:   Transvaginal pelvic ultrasound was performed.  Color Doppler evaluation was   performed.       COMPARISON:   Noncontrast CT May 16, 2019       HISTORY:   ORDERING SYSTEM PROVIDED HISTORY: Right lower quadrant pain, ovarian mass   seen on CAT scan   TECHNOLOGIST PROVIDED HISTORY:   Ordering Physician Provided Reason for Exam: Pelvic pain, follow up to CT for   abnormal findings   Acuity: Acute   Type of Exam: Subsequent/Follow-up       FINDINGS:       Measurements:       Uterus:  Surgically absent       Endometrial stripe:  Not measured       Right Ovary:  5.1 x 3.6 x 5.1 cm       Left Ovary:  Not measured           Ultrasound Findings:       Status post hysterectomy.       Right Ovary: A 3.2 x 2.2 x 3.0 cm hemorrhagic cyst.  No solid or suspicious   features are identified.  The right ovary is otherwise within normal limits   and demonstrates appropriate Doppler signal.       Left Ovary: Not visualized.       Free Fluid: No evidence of free fluid.           Impression   3.2 cm hemorrhagic cyst in the right ovary, corresponding to the CT findings.    No evidence for torsion.  No follow-up is necessary, assuming symptoms   resolve.       Nonvisualization of the left ovary.  Status post hysterectomy.             LABS:  Results for orders placed or performed during the hospital encounter of 05/16/19   CBC Auto Differential   Result Value Ref Range    WBC 9.0 3.5 - 11.0 k/uL    RBC 4.58 4.0 - 5.2 m/uL    Hemoglobin 14.4 12.0 - 16.0 g/dL    Hematocrit 43.0 36 - 46 % MCV 94.0 80 - 100 fL    MCH 31.4 26 - 34 pg    MCHC 33.4 31 - 37 g/dL    RDW 13.5 12.5 - 15.4 %    Platelets 597 871 - 302 k/uL    MPV 9.4 6.0 - 12.0 fL    NRBC Automated NOT REPORTED per 100 WBC    Differential Type NOT REPORTED     Seg Neutrophils 59 36 - 66 %    Lymphocytes 33 24 - 44 %    Monocytes 6 2 - 11 %    Eosinophils % 2 1 - 4 %    Basophils 0 0 - 2 %    Immature Granulocytes NOT REPORTED 0 %    Segs Absolute 5.30 1.8 - 7.7 k/uL    Absolute Lymph # 3.00 1.0 - 4.8 k/uL    Absolute Mono # 0.50 0.1 - 1.2 k/uL    Absolute Eos # 0.20 0.0 - 0.4 k/uL    Basophils # 0.00 0.0 - 0.2 k/uL    Absolute Immature Granulocyte NOT REPORTED 0.00 - 0.30 k/uL    WBC Morphology NOT REPORTED     RBC Morphology NOT REPORTED     Platelet Estimate NOT REPORTED    Comprehensive Metabolic Panel   Result Value Ref Range    Glucose 97 70 - 99 mg/dL    BUN 16 6 - 20 mg/dL    CREATININE 0.60 0.50 - 0.90 mg/dL    Bun/Cre Ratio NOT REPORTED 9 - 20    Calcium 9.4 8.6 - 10.4 mg/dL    Sodium 138 135 - 144 mmol/L    Potassium 4.4 3.7 - 5.3 mmol/L    Chloride 102 98 - 107 mmol/L    CO2 23 20 - 31 mmol/L    Anion Gap 13 9 - 17 mmol/L    Alkaline Phosphatase 93 35 - 104 U/L    ALT 32 5 - 33 U/L    AST 26 <32 U/L    Total Bilirubin 1.10 0.3 - 1.2 mg/dL    Total Protein 7.6 6.4 - 8.3 g/dL    Alb 4.3 3.5 - 5.2 g/dL    Albumin/Globulin Ratio 1.3 1.0 - 2.5    GFR Non-African American >60 >60 mL/min    GFR African American >60 >60 mL/min    GFR Comment          GFR Staging NOT REPORTED    HCG Qualitative, Serum   Result Value Ref Range    hCG Qual NEGATIVE NEGATIVE   Lipase   Result Value Ref Range    Lipase 33 13 - 60 U/L   Urinalysis   Result Value Ref Range    Color, UA YELLOW YELLOW    Turbidity UA CLEAR CLEAR    Glucose, Ur NEGATIVE NEGATIVE    Bilirubin Urine NEGATIVE NEGATIVE    Ketones, Urine NEGATIVE NEGATIVE    Specific Gravity, UA 1.015 1.005 - 1.030    Urine Hgb NEGATIVE NEGATIVE    pH, UA 7.5 5.0 - 8.0    Protein, UA NEGATIVE NEGATIVE

## 2019-05-16 NOTE — ED NOTES
Pt presents to the ED with a c/c of lower right abdominal pain, right sided flank pain, dysuria, and nausea. PT states that this has been on going for the past few days and has just gotten worse. Pt states that she has had a hysterectomy however she states that she still has one ovary and she can have cysts sometimes. Pt states she has a history of kidney infections as well. Pt states she vomited once last night and she states she does not have frequent BM's which is normal for her. Denies any blood in her vomit or urine however she states her urine has been cloudy. Pt ambulatory with steady gait. Pt denies chest pain, SOB, diarrhea, fevers, or chills. Pt resting in bed in NAD, skin pink warm and dry, respirations even and unlabored and call light within reach.      Josh Leonardo RN  05/16/19 2143

## 2019-05-16 NOTE — ED NOTES
Pt returned to room from imaging. Pt resting in bed in NAD, call light in reach, skin pink warm and dry, no needs at this time.      Josh Leonardo RN  05/16/19 5224

## 2019-05-17 LAB
CULTURE: NORMAL
Lab: NORMAL
SPECIMEN DESCRIPTION: NORMAL

## 2020-03-01 ENCOUNTER — HOSPITAL ENCOUNTER (EMERGENCY)
Facility: CLINIC | Age: 35
Discharge: HOME | End: 2020-03-01
Attending: EMERGENCY MEDICINE

## 2020-03-01 VITALS
DIASTOLIC BLOOD PRESSURE: 101 MMHG | RESPIRATION RATE: 18 BRPM | SYSTOLIC BLOOD PRESSURE: 132 MMHG | HEART RATE: 103 BPM | WEIGHT: 200 LBS | OXYGEN SATURATION: 97 % | TEMPERATURE: 98.2 F | HEIGHT: 63 IN | BODY MASS INDEX: 35.44 KG/M2

## 2020-03-01 LAB
DIRECT EXAM: ABNORMAL
DIRECT EXAM: ABNORMAL
DIRECT EXAM: NORMAL
Lab: ABNORMAL
Lab: NORMAL
SPECIMEN DESCRIPTION: ABNORMAL
SPECIMEN DESCRIPTION: NORMAL

## 2020-03-01 PROCEDURE — 87880 STREP A ASSAY W/OPTIC: CPT

## 2020-03-01 PROCEDURE — 99283 EMERGENCY DEPT VISIT LOW MDM: CPT

## 2020-03-01 PROCEDURE — 87804 INFLUENZA ASSAY W/OPTIC: CPT

## 2020-03-01 RX ORDER — ONDANSETRON 4 MG/1
4 TABLET, ORALLY DISINTEGRATING ORAL EVERY 8 HOURS PRN
Qty: 10 TABLET | Refills: 0 | Status: SHIPPED | OUTPATIENT
Start: 2020-03-01 | End: 2022-09-09

## 2020-03-01 ASSESSMENT — PAIN DESCRIPTION - LOCATION: LOCATION: CHEST

## 2020-03-01 ASSESSMENT — PAIN DESCRIPTION - PAIN TYPE: TYPE: ACUTE PAIN

## 2020-03-01 ASSESSMENT — PAIN DESCRIPTION - DESCRIPTORS: DESCRIPTORS: PRESSURE

## 2020-03-01 ASSESSMENT — PAIN SCALES - GENERAL: PAINLEVEL_OUTOF10: 9

## 2020-03-01 ASSESSMENT — PAIN DESCRIPTION - FREQUENCY: FREQUENCY: INTERMITTENT

## 2020-03-01 ASSESSMENT — PAIN DESCRIPTION - ONSET: ONSET: GRADUAL

## 2020-03-01 ASSESSMENT — PAIN DESCRIPTION - PROGRESSION: CLINICAL_PROGRESSION: GRADUALLY WORSENING

## 2020-03-01 NOTE — ED PROVIDER NOTES
[address] [facility] ED  EMERGENCY DEPARTMENT ENCOUNTER      Pt Name: Alyssa Cash  MRN: [id number]  [place] 1985  Date of evaluation: 3/1/2020  Provider: Cate Pineda MD    CHIEF COMPLAINT     Chief Complaint   Patient presents with    Cough     Symptoms started this past Thursday.  Shortness of Breath    Fever    Sweats    Migraine    Emesis    Dizziness    Fatigue    Chest Pain         HISTORY OF PRESENT ILLNESS   (Location/Symptom, Timing/Onset, Context/Setting,Quality, Duration, Modifying Factors, Severity)  Note limiting factors. Alyssa Cash is a 29 y.o. female who presents to the emergency department with a 4-day illness which started out with a cough, nausea and vomiting but no diarrhea. She has also felt feverish and describes myalgias. The history is provided by the patient. Nursing Notes werereviewed. REVIEW OF SYSTEMS    (2-9 systems for level 4, 10 or more for level 5)     Review of Systems   All other systems reviewed and are negative. Except as noted above the remainder of the review of systems was reviewed and negative.        PAST MEDICAL HISTORY     Past Medical History:   Diagnosis Date    Back pain     herniated disc    Kidney infection     Kidney stone     Migraines     Ovarian cyst          SURGICALHISTORY       Past Surgical History:   Procedure Laterality Date    DILATION AND CURETTAGE OF UTERUS      ENDOMETRIAL ABLATION      HYSTERECTOMY      TUBAL LIGATION           CURRENT MEDICATIONS       Current Discharge Medication List      CONTINUE these medications which have NOT CHANGED    Details   Cyanocobalamin (VITAMIN B 12 PO) Take by mouth 2 times daily      !! ibuprofen (ADVIL;MOTRIN) 800 MG tablet Take 1 tablet by mouth every 8 hours as needed for Pain  Qty: 30 tablet, Refills: 0      naproxen (NAPROSYN) 500 MG tablet Take 1 tablet by mouth 2 times daily  Qty: 12 tablet, Refills: 0      acetaminophen (TYLENOL) 500 MG tablet Take 1,000 mg by mouth every 6 hours as needed for Pain. !! ibuprofen (ADVIL;MOTRIN) 800 MG tablet Take 1 tablet by mouth every 8 hours as needed for Pain. Qty: 30 tablet, Refills: 0       !! - Potential duplicate medications found. Please discuss with provider. ALLERGIES     Latex; Imitrex [sumatriptan]; Pcn [penicillins]; Zoloft [sertraline hcl]; and Zomig [zolmitriptan]    FAMILY HISTORY     History reviewed. No pertinent family history.        SOCIAL HISTORY       Social History     Socioeconomic History    Marital status:      Spouse name: None    Number of children: None    Years of education: None    Highest education level: None   Occupational History    None   Social Needs    Financial resource strain: None    Food insecurity:     Worry: None     Inability: None    Transportation needs:     Medical: None     Non-medical: None   Tobacco Use    Smoking status: Never Smoker    Smokeless tobacco: Never Used    Tobacco comment: uses vape   Substance and Sexual Activity    Alcohol use: No    Drug use: No    Sexual activity: Yes     Partners: Male   Lifestyle    Physical activity:     Days per week: None     Minutes per session: None    Stress: None   Relationships    Social connections:     Talks on phone: None     Gets together: None     Attends Bahai service: None     Active member of club or organization: None     Attends meetings of clubs or organizations: None     Relationship status: None    Intimate partner violence:     Fear of current or ex partner: None     Emotionally abused: None     Physically abused: None     Forced sexual activity: None   Other Topics Concern    None   Social History Narrative    None       SCREENINGS             PHYSICAL EXAM    (up to 7 for level 4, 8 or more for level 5)     ED Triage Vitals [03/01/20 1407]   BP Temp Temp Source Pulse Resp SpO2 Height Weight   (!) 132/101 98.2 °F (36.8 °C) Oral 103 18 97 % 5' 3\" (1.6 m) 200 lb (90.7 kg)       Physical Exam  Vitals signs reviewed. Constitutional:       General: She is not in acute distress. Appearance: She is obese. She is ill-appearing. HENT:      Head: Normocephalic. Right Ear: External ear normal.      Left Ear: External ear normal.      Nose: Nose normal. No rhinorrhea. Mouth/Throat:      Mouth: Mucous membranes are moist.      Pharynx: Posterior oropharyngeal erythema present. Eyes:      General: No scleral icterus. Extraocular Movements: Extraocular movements intact. Neck:      Musculoskeletal: Neck supple. Cardiovascular:      Rate and Rhythm: Normal rate and regular rhythm. Heart sounds: Normal heart sounds. No murmur. Pulmonary:      Effort: Pulmonary effort is normal.      Breath sounds: Normal breath sounds. No rhonchi or rales. Abdominal:      Palpations: Abdomen is soft. Tenderness: There is no abdominal tenderness. Musculoskeletal: Normal range of motion. Lymphadenopathy:      Cervical: No cervical adenopathy. Skin:     General: Skin is warm and dry. Coloration: Skin is not pale. Findings: No rash. Neurological:      General: No focal deficit present. Mental Status: She is alert.          DIAGNOSTIC RESULTS     EKG: All EKG's are interpreted by the Emergency Department Physician who either signs orCo-signs this chart in the absence of a cardiologist.    RADIOLOGY:   Non-plain film images such as CT, Ultrasound and MRI are read by the radiologist. Plain radiographic images are visualized and preliminarily interpreted by the emergency physician with the below findings:    Interpretation per the Radiologist below, ifavailable at the time of this note:    No orders to display         ED BEDSIDE ULTRASOUND:   Performed by ED Physician - none    LABS:  Labs Reviewed   RAPID INFLUENZA A/B ANTIGENS - Abnormal; Notable for the following components:       Result Value    Direct Exam POSITIVE for Influenza A Antigen (*)     All other components

## 2021-10-14 ENCOUNTER — OFFICE VISIT (OUTPATIENT)
Dept: ORTHOPEDIC SURGERY | Age: 36
End: 2021-10-14
Payer: MEDICAID

## 2021-10-14 DIAGNOSIS — M25.512 ACUTE PAIN OF LEFT SHOULDER: ICD-10-CM

## 2021-10-14 DIAGNOSIS — M75.42 SHOULDER IMPINGEMENT SYNDROME, LEFT: ICD-10-CM

## 2021-10-14 DIAGNOSIS — M54.2 NECK PAIN: Primary | ICD-10-CM

## 2021-10-14 PROCEDURE — 99203 OFFICE O/P NEW LOW 30 MIN: CPT | Performed by: ORTHOPAEDIC SURGERY

## 2021-10-14 PROCEDURE — 20610 DRAIN/INJ JOINT/BURSA W/O US: CPT | Performed by: ORTHOPAEDIC SURGERY

## 2021-10-14 RX ORDER — TRIAMCINOLONE ACETONIDE 40 MG/ML
40 INJECTION, SUSPENSION INTRA-ARTICULAR; INTRAMUSCULAR ONCE
Status: COMPLETED | OUTPATIENT
Start: 2021-10-14 | End: 2021-10-14

## 2021-10-14 RX ORDER — BUPIVACAINE HYDROCHLORIDE 2.5 MG/ML
2 INJECTION, SOLUTION INFILTRATION; PERINEURAL ONCE
Status: COMPLETED | OUTPATIENT
Start: 2021-10-14 | End: 2021-10-14

## 2021-10-14 RX ORDER — LIDOCAINE HYDROCHLORIDE 10 MG/ML
2 INJECTION, SOLUTION INFILTRATION; PERINEURAL ONCE
Status: COMPLETED | OUTPATIENT
Start: 2021-10-14 | End: 2021-10-14

## 2021-10-14 RX ADMIN — BUPIVACAINE HYDROCHLORIDE 5 MG: 2.5 INJECTION, SOLUTION INFILTRATION; PERINEURAL at 13:31

## 2021-10-14 RX ADMIN — TRIAMCINOLONE ACETONIDE 40 MG: 40 INJECTION, SUSPENSION INTRA-ARTICULAR; INTRAMUSCULAR at 12:49

## 2021-10-14 RX ADMIN — LIDOCAINE HYDROCHLORIDE 2 ML: 10 INJECTION, SOLUTION INFILTRATION; PERINEURAL at 13:31

## 2021-10-14 NOTE — PROGRESS NOTES
Patient ID: Dilan Ghosh is a 39 y.o. female    Chief Compliant:  Chief Complaint   Patient presents with    Established New Doctor    Pain     neck pain        Diagnostic imaging:    AP lateral cervical spine normal for age fairly minimal spondylotic changes    4 views left shoulder normal joint space no significant AC joint arthritis normal for age    Assessment and Plan:  1. Neck pain    2. Acute pain of left shoulder    3. BMI 35.0-35.9,adult    4. Shoulder impingement syndrome, left         Left shoulder injection today subacromial modest relief of pain    Follow up in 3-4 weeks    Moderate relief of pain with subacromial injection    An informed verbal consent for the procedure was obtained and risks including, but not limited to: allergy to medications, injection, bleeding, stiffness of joint, recurrence of symptoms, loss of function, swelling, drainage, irrigation, need for surgery and pseudo-septic inflammation, were explained to the patient. Also, discussed was the potential for further injections, irrigation and debridement and surgery. Alternate means of treatment have also been discussed with the patient. Administrations This Visit     triamcinolone acetonide (KENALOG-40) injection 40 mg     Admin Date  10/14/2021  12:49 Action  Given Dose  40 mg Route  Intra-articular Site   Administered By  Fay Castillo MA    Ordering Provider: Blake Mojica MD    NDC: 5918-6006-03    Lot#: [id number]    : [employer] [place] ([facility])    Patient Supplied?: No                HPI:  This is a 39 y.o. female who presents to the clinic today for left shoulder and neck evaluation. Patient was injured on 08/17/2021 while at work. At that time, urgent care told her she had a left elbow contusion and trapezius strain    Following her injury, she has had new onset left worse than right neck pain.  She has occasional paresthesias in her left arm    She has been off of work since her injury and is scheduled to return on Monday 10/18    She had 6 sessions of PT on her shoulder and elbow without improvement    Patient has a history of an ablation in 2016 for cervical disk bulging, which improved her pain at that time          Review of Systems   All other systems reviewed and are negative.       Past History:    Current Outpatient Medications:     ondansetron (ZOFRAN ODT) 4 MG disintegrating tablet, Take 1 tablet by mouth every 8 hours as needed for Nausea, Disp: 10 tablet, Rfl: 0    ibuprofen (ADVIL;MOTRIN) 800 MG tablet, Take 1 tablet by mouth every 8 hours as needed for Pain, Disp: 30 tablet, Rfl: 0    naproxen (NAPROSYN) 500 MG tablet, Take 1 tablet by mouth 2 times daily, Disp: 12 tablet, Rfl: 0    Cyanocobalamin (VITAMIN B 12 PO), Take by mouth 2 times daily, Disp: , Rfl:     acetaminophen (TYLENOL) 500 MG tablet, Take 1,000 mg by mouth every 6 hours as needed for Pain., Disp: , Rfl:     ibuprofen (ADVIL;MOTRIN) 800 MG tablet, Take 1 tablet by mouth every 8 hours as needed for Pain., Disp: 30 tablet, Rfl: 0  Allergies   Allergen Reactions    Latex     Imitrex [Sumatriptan]     Pcn [Penicillins] Other (See Comments)     \"Upset stomach\"    Zoloft [Sertraline Hcl]     Zomig [Zolmitriptan]      Social History     Socioeconomic History    Marital status:      Spouse name: Not on file    Number of children: Not on file    Years of education: Not on file    Highest education level: Not on file   Occupational History    Not on file   Tobacco Use    Smoking status: Never Smoker    Smokeless tobacco: Never Used    Tobacco comment: uses vape   Vaping Use    Vaping Use: Every day    Substances: Never   Substance and Sexual Activity    Alcohol use: No    Drug use: No    Sexual activity: Yes     Partners: Male   Other Topics Concern    Not on file   Social History Narrative    Not on file     Social Determinants of Health     Financial Resource Strain:     Difficulty of Paying Living Expenses:    Food Insecurity:     Worried About Running Out of Food in the Last Year:     [address] in the Last Year:    Transportation Needs:     Lack of Transportation (Medical):  Lack of Transportation (Non-Medical):    Physical Activity:     Days of Exercise per Week:     Minutes of Exercise per Session:    Stress:     Feeling of Stress :    Social Connections:     Frequency of Communication with Friends and Family:     Frequency of Social Gatherings with Friends and Family:     Attends Scientologist Services:     Active Member of Clubs or Organizations:     Attends Club or Organization Meetings:     Marital Status:    Intimate Partner Violence:     Fear of Current or Ex-Partner:     Emotionally Abused:     Physically Abused:     Sexually Abused:      Past Medical History:   Diagnosis Date    Back pain     herniated disc    Kidney infection     Kidney stone     Migraines     Ovarian cyst      Past Surgical History:   Procedure Laterality Date    DILATION AND CURETTAGE OF UTERUS      ENDOMETRIAL ABLATION      HYSTERECTOMY      TUBAL LIGATION       No family history on file. Physical Exam:  Vitals signs and nursing note reviewed. Constitutional:       Appearance: well-developed. HENT:      Head: Normocephalic and atraumatic. Nose: Nose normal.   Eyes:      Conjunctiva/sclera: Conjunctivae normal.   Neck:      Musculoskeletal: Normal range of motion and neck supple. Pulmonary:      Effort: Pulmonary effort is normal. No respiratory distress. Musculoskeletal:      Comments: Normal gait     Skin:     General: Skin is warm and dry. Neurological:      Mental Status: Alert and oriented to person, place, and time. Sensory: No sensory deficit. Psychiatric:         Behavior: Behavior normal.         Thought Content: Thought content normal.        Provider Attestation:  Mitchell Chaudhari, personally performed the services described in this documentation.  All medical record entries made by the scribe were at my direction and in my presence. I have reviewed the chart and discharge instructions and agree that the records reflect my personal performance and is accurate and complete. Karri Baires MD 10/14/21     Scribe Attestation:  By signing my name below, Chon Polanco, attest that this documentation has been prepared under the direction and in the presence of Dr. William Villanueva. Electronically signed: Matthew Stringer, 10/14/21     Please note that this chart was generated using voice recognition Dragon dictation software. Although every effort was made to ensure the accuracy of this automated transcription, some errors in transcription may have occurred.

## 2021-11-11 ENCOUNTER — OFFICE VISIT (OUTPATIENT)
Dept: ORTHOPEDIC SURGERY | Age: 36
End: 2021-11-11

## 2021-11-11 VITALS — BODY MASS INDEX: 35.79 KG/M2 | HEIGHT: 63 IN | WEIGHT: 202 LBS

## 2021-11-11 DIAGNOSIS — M54.2 NECK PAIN: ICD-10-CM

## 2021-11-11 DIAGNOSIS — M25.511 RIGHT SHOULDER PAIN, UNSPECIFIED CHRONICITY: ICD-10-CM

## 2021-11-11 DIAGNOSIS — M25.512 ACUTE PAIN OF LEFT SHOULDER: Primary | ICD-10-CM

## 2021-11-11 DIAGNOSIS — M75.42 SHOULDER IMPINGEMENT SYNDROME, LEFT: ICD-10-CM

## 2021-11-11 PROCEDURE — 99213 OFFICE O/P EST LOW 20 MIN: CPT | Performed by: ORTHOPAEDIC SURGERY

## 2021-11-11 NOTE — PROGRESS NOTES
Patient ID: Jaswinder Baker is a 39 y.o. female    Chief Compliant:  No chief complaint on file. Diagnostic imaging:        Assessment and Plan:  1. Acute pain of left shoulder    2. Shoulder impingement syndrome, left    3. Neck pain      Work restrictions now greater than 10 lbs limited stooping, bending, and twisting. PT    Follow up 6 weeks    HPI:  This is a 39 y.o. female who presents to the clinic today for left shoulder pain. Patient notes moderate relief with injection at the last visit and her left shoulder ROM has improved. Her pain is worst over the right trapezius     She has underwent 6 visits of PT and however it was stopped due to a lack of progress. Patient reports lifting and turning her neck at work has aggravated her pain. Patient's pain at this time is actually greater over the right trapezial region        Review of Systems   All other systems reviewed and are negative.       Past History:    Current Outpatient Medications:     ondansetron (ZOFRAN ODT) 4 MG disintegrating tablet, Take 1 tablet by mouth every 8 hours as needed for Nausea, Disp: 10 tablet, Rfl: 0    ibuprofen (ADVIL;MOTRIN) 800 MG tablet, Take 1 tablet by mouth every 8 hours as needed for Pain, Disp: 30 tablet, Rfl: 0    naproxen (NAPROSYN) 500 MG tablet, Take 1 tablet by mouth 2 times daily, Disp: 12 tablet, Rfl: 0    Cyanocobalamin (VITAMIN B 12 PO), Take by mouth 2 times daily, Disp: , Rfl:     acetaminophen (TYLENOL) 500 MG tablet, Take 1,000 mg by mouth every 6 hours as needed for Pain., Disp: , Rfl:     ibuprofen (ADVIL;MOTRIN) 800 MG tablet, Take 1 tablet by mouth every 8 hours as needed for Pain., Disp: 30 tablet, Rfl: 0  Allergies   Allergen Reactions    Latex     Imitrex [Sumatriptan]     Pcn [Penicillins] Other (See Comments)     \"Upset stomach\"    Zoloft [Sertraline Hcl]     Zomig [Zolmitriptan]      Social History     Socioeconomic History    Marital status:      Spouse name: Not on file    Number of children: Not on file    Years of education: Not on file    Highest education level: Not on file   Occupational History    Not on file   Tobacco Use    Smoking status: Never Smoker    Smokeless tobacco: Never Used    Tobacco comment: uses vape   Vaping Use    Vaping Use: Every day    Substances: Never   Substance and Sexual Activity    Alcohol use: No    Drug use: No    Sexual activity: Yes     Partners: Male   Other Topics Concern    Not on file   Social History Narrative    Not on file     Social Determinants of Health     Financial Resource Strain:     Difficulty of Paying Living Expenses: Not on file   Food Insecurity:     Worried About Running Out of Food in the Last Year: Not on file    Say of Food in the Last Year: Not on file   Transportation Needs:     Lack of Transportation (Medical): Not on file    Lack of Transportation (Non-Medical):  Not on file   Physical Activity:     Days of Exercise per Week: Not on file    Minutes of Exercise per Session: Not on file   Stress:     Feeling of Stress : Not on file   Social Connections:     Frequency of Communication with Friends and Family: Not on file    Frequency of Social Gatherings with Friends and Family: Not on file    Attends Buddhist Services: Not on file    Active Member of [address] [place] [address] [email or web address] or Organizations: Not on file    Attends Club or Organization Meetings: Not on file    Marital Status: Not on file   Intimate Partner Violence:     Fear of Current or Ex-Partner: Not on file    Emotionally Abused: Not on file    Physically Abused: Not on file    Sexually Abused: Not on file   Housing Stability:     Unable to Pay for Housing in the Last Year: Not on file    Number of [place] in the Last Year: Not on file    Unstable Housing in the Last Year: Not on file     Past Medical History:   Diagnosis Date    Back pain     herniated disc    Kidney infection     Kidney stone     Migraines     Ovarian cyst Past Surgical History:   Procedure Laterality Date    DILATION AND CURETTAGE OF UTERUS      ENDOMETRIAL ABLATION      HYSTERECTOMY      TUBAL LIGATION       No family history on file. Physical Exam:  Vitals signs and nursing note reviewed. Constitutional:       Appearance: well-developed. HENT:      Head: Normocephalic and atraumatic. Nose: Nose normal.   Eyes:      Conjunctiva/sclera: Conjunctivae normal.   Neck:      Musculoskeletal: Normal range of motion and neck supple. Pulmonary:      Effort: Pulmonary effort is normal. No respiratory distress. Musculoskeletal:      Comments: Normal gait     Skin:     General: Skin is warm and dry. Neurological:      Mental Status: Alert and oriented to person, place, and time. Sensory: No sensory deficit. Psychiatric:         Behavior: Behavior normal.         Thought Content: Thought content normal.      Physical exam today good shoulder range of motion largely negative impingement signs. Neck with normal range of motion without significant aggravation although patient does describe some right trapezial myalgia      Provider Attestation:  Robert Chaudhari, personally performed the services described in this documentation. All medical record entries made by the scribe were at my direction and in my presence. I have reviewed the chart and discharge instructions and agree that the records reflect my personal performance and is accurate and complete. Lupe Page MD 11/11/21     Scribe Attestation:  By signing my name below, Pedrito Lilliana, attest that this documentation has been prepared under the direction and in the presence of Dr. Albert Tejada. Electronically signed: Matthew Barba, 11/11/21     Please note that this chart was generated using voice recognition Dragon dictation software. Although every effort was made to ensure the accuracy of this automated transcription, some errors in transcription may have occurred.

## 2021-11-15 ENCOUNTER — HOSPITAL ENCOUNTER (OUTPATIENT)
Dept: PHYSICAL THERAPY | Facility: CLINIC | Age: 36
Setting detail: THERAPIES SERIES
Discharge: HOME | End: 2021-11-15

## 2021-11-15 NOTE — FLOWSHEET NOTE
[] [facility]  [address]    [phone]  F: [phone]   [] [address]  [facility] [place] [facility]   [address]  P: [phone]  F: [phone]  [] [place]  [address] [facility]  P: [phone]  F: [phone] [x] [address]  P: [phone]  F: [phone]  [] [address]  [facility]   [address]   [place]: [phone]  F: [phone]   [] [place] [address] [facility] [address]  [place]: [phone]   F: [phone]     Physical Therapy Cancel/No Show note    Date: 11/15/2021  Patient: Emelyn Ashraf  : 1985  MRN: [id number]    Cancels/No Shows to date:   For today's appointment patient:    [x]  Cancelled    [] Rescheduled appointment    [] No-show     Reason given by patient:    [x]  Patient ill    []  Conflicting appointment    [] No transportation      [] Conflict with work    [] No reason given    [] Weather related    [] COVID-19    [] Other:      Comments:  Pt called and said she was en route to ED. Will call back to reschedule.     [] Next appointment was confirmed    Electronically signed by: Piper Khan

## 2022-01-20 ENCOUNTER — TELEPHONE (OUTPATIENT)
Dept: ORTHOPEDIC SURGERY | Age: 37
End: 2022-01-20

## 2022-01-20 NOTE — TELEPHONE ENCOUNTER
Jesus Manuel Oneil is calling from pt link requesting pts referral be fax to [phone] Enedelia Redding also has some questions about pt worker comp Enedelia Redding can be reached at [phone] opt 5 then opt 1

## 2022-02-10 ENCOUNTER — OFFICE VISIT (OUTPATIENT)
Dept: ORTHOPEDIC SURGERY | Age: 37
End: 2022-02-10
Payer: COMMERCIAL

## 2022-02-10 DIAGNOSIS — M54.2 NECK PAIN: ICD-10-CM

## 2022-02-10 DIAGNOSIS — M25.512 ACUTE PAIN OF LEFT SHOULDER: Primary | ICD-10-CM

## 2022-02-10 DIAGNOSIS — M54.12 CERVICAL RADICULAR PAIN: ICD-10-CM

## 2022-02-10 PROCEDURE — 99213 OFFICE O/P EST LOW 20 MIN: CPT | Performed by: ORTHOPAEDIC SURGERY

## 2022-02-10 RX ORDER — ACETAMINOPHEN AND CODEINE PHOSPHATE 300; 30 MG/1; MG/1
TABLET ORAL
COMMUNITY
Start: 2021-11-20 | End: 2022-04-15

## 2022-02-10 NOTE — PROGRESS NOTES
Patient ID: Awanda Nyhan is a 39 y.o. female    Chief Compliant:  Chief Complaint   Patient presents with    Follow-up     left shoulder        Diagnostic imaging:        Assessment and Plan:  1. Acute pain of left shoulder    2. Neck pain    3. Cervical radicular pain        Patient has attempted multiple courses of PT which have aggravated her pain to an intolerable level and she was unable to complete    MRI cervical spine    Work restrictions updated, the same no bull pin    Follow up after MRI    HPI:  This is a 39 y.o. female who presents to the clinic today for left shoulder pain. Patient notes ongoing neck pain radiating to the left arm    Patient has underwent PT which has aggravated her pain    Review of Systems   All other systems reviewed and are negative.       Past History:    Current Outpatient Medications:     ondansetron (ZOFRAN ODT) 4 MG disintegrating tablet, Take 1 tablet by mouth every 8 hours as needed for Nausea, Disp: 10 tablet, Rfl: 0    ibuprofen (ADVIL;MOTRIN) 800 MG tablet, Take 1 tablet by mouth every 8 hours as needed for Pain, Disp: 30 tablet, Rfl: 0    naproxen (NAPROSYN) 500 MG tablet, Take 1 tablet by mouth 2 times daily, Disp: 12 tablet, Rfl: 0    Cyanocobalamin (VITAMIN B 12 PO), Take by mouth 2 times daily, Disp: , Rfl:     acetaminophen (TYLENOL) 500 MG tablet, Take 1,000 mg by mouth every 6 hours as needed for Pain., Disp: , Rfl:     ibuprofen (ADVIL;MOTRIN) 800 MG tablet, Take 1 tablet by mouth every 8 hours as needed for Pain., Disp: 30 tablet, Rfl: 0  Allergies   Allergen Reactions    Latex     Imitrex [Sumatriptan]     Pcn [Penicillins] Other (See Comments)     \"Upset stomach\"    Zoloft [Sertraline Hcl]     Zomig [Zolmitriptan]      Social History     Socioeconomic History    Marital status:      Spouse name: Not on file    Number of children: Not on file    Years of education: Not on file    Highest education level: Not on file   Occupational History    Not on file   Tobacco Use    Smoking status: Never Smoker    Smokeless tobacco: Never Used    Tobacco comment: uses vape   Vaping Use    Vaping Use: Every day    Substances: Never   Substance and Sexual Activity    Alcohol use: No    Drug use: No    Sexual activity: Yes     Partners: Male   Other Topics Concern    Not on file   Social History Narrative    Not on file     Social Determinants of Health     Financial Resource Strain:     Difficulty of Paying Living Expenses: Not on file   Food Insecurity:     Worried About Running Out of Food in the Last Year: Not on file    Say of Food in the Last Year: Not on file   Transportation Needs:     Lack of Transportation (Medical): Not on file    Lack of Transportation (Non-Medical):  Not on file   Physical Activity:     Days of Exercise per Week: Not on file    Minutes of Exercise per Session: Not on file   Stress:     Feeling of Stress : Not on file   Social Connections:     Frequency of Communication with Friends and Family: Not on file    Frequency of Social Gatherings with Friends and Family: Not on file    Attends Taoist Services: Not on file    Active Member of [address] [place] [address] or Organizations: Not on file    Attends Club or Organization Meetings: Not on file    Marital Status: Not on file   Intimate Partner Violence:     Fear of Current or Ex-Partner: Not on file    Emotionally Abused: Not on file    Physically Abused: Not on file    Sexually Abused: Not on file   Housing Stability:     Unable to Pay for Housing in the Last Year: Not on file    Number of [place] in the Last Year: Not on file    Unstable Housing in the Last Year: Not on file     Past Medical History:   Diagnosis Date    Back pain     herniated disc    Kidney infection     Kidney stone     Migraines     Ovarian cyst      Past Surgical History:   Procedure Laterality Date    DILATION AND CURETTAGE OF UTERUS      ENDOMETRIAL ABLATION      HYSTERECTOMY  TUBAL LIGATION       No family history on file. Physical Exam:  Vitals signs and nursing note reviewed. Constitutional:       Appearance: well-developed. HENT:      Head: Normocephalic and atraumatic. Nose: Nose normal.   Eyes:      Conjunctiva/sclera: Conjunctivae normal.   Neck:      Musculoskeletal: Normal range of motion and neck supple. Pulmonary:      Effort: Pulmonary effort is normal. No respiratory distress. Musculoskeletal:      Comments: Normal gait     Skin:     General: Skin is warm and dry. Neurological:      Mental Status: Alert and oriented to person, place, and time. Sensory: No sensory deficit. Psychiatric:         Behavior: Behavior normal.         Thought Content: Thought content normal.    Significantly positive Spurling's with radiation into the left arm    No dysesthesias    Provider Attestation:  Yennifer Chaudhari, personally performed the services described in this documentation. All medical record entries made by the scribe were at my direction and in my presence. I have reviewed the chart and discharge instructions and agree that the records reflect my personal performance and is accurate and complete. Annia Jeff MD 2/10/22     Scribe Attestation:  By signing my name below, Rahat Pk, attest that this documentation has been prepared under the direction and in the presence of Dr. Delvin Ryan. Electronically signed: Matthew Chapin, 2/10/22     Please note that this chart was generated using voice recognition Dragon dictation software. Although every effort was made to ensure the accuracy of this automated transcription, some errors in transcription may have occurred.

## 2022-02-17 ENCOUNTER — HOSPITAL ENCOUNTER (OUTPATIENT)
Dept: MRI IMAGING | Facility: CLINIC | Age: 37
Discharge: HOME | End: 2022-02-19
Payer: COMMERCIAL

## 2022-02-17 DIAGNOSIS — M54.2 NECK PAIN: ICD-10-CM

## 2022-02-17 DIAGNOSIS — M54.12 CERVICAL RADICULAR PAIN: ICD-10-CM

## 2022-02-17 PROCEDURE — 72141 MRI NECK SPINE W/O DYE: CPT

## 2022-02-21 ENCOUNTER — PATIENT MESSAGE (OUTPATIENT)
Dept: ORTHOPEDIC SURGERY | Age: 37
End: 2022-02-21

## 2022-02-21 ENCOUNTER — TELEPHONE (OUTPATIENT)
Dept: ORTHOPEDIC SURGERY | Age: 37
End: 2022-02-21

## 2022-02-21 DIAGNOSIS — M75.42 SHOULDER IMPINGEMENT SYNDROME, LEFT: ICD-10-CM

## 2022-02-21 DIAGNOSIS — M25.512 ACUTE PAIN OF LEFT SHOULDER: Primary | ICD-10-CM

## 2022-02-21 NOTE — TELEPHONE ENCOUNTER
Patient had MRI cervical, now patient would like MRI shoulder order to r/o any tears in shoulder. Are you okay with ordering?

## 2022-03-19 ENCOUNTER — HOSPITAL ENCOUNTER (EMERGENCY)
Facility: CLINIC | Age: 37
Discharge: HOME | End: 2022-03-19
Attending: EMERGENCY MEDICINE
Payer: COMMERCIAL

## 2022-03-19 ENCOUNTER — APPOINTMENT (OUTPATIENT)
Dept: GENERAL RADIOLOGY | Facility: CLINIC | Age: 37
End: 2022-03-19
Payer: COMMERCIAL

## 2022-03-19 VITALS
BODY MASS INDEX: 40.75 KG/M2 | SYSTOLIC BLOOD PRESSURE: 148 MMHG | HEART RATE: 110 BPM | DIASTOLIC BLOOD PRESSURE: 101 MMHG | OXYGEN SATURATION: 97 % | WEIGHT: 230 LBS | HEIGHT: 63 IN | TEMPERATURE: 98.6 F | RESPIRATION RATE: 16 BRPM

## 2022-03-19 DIAGNOSIS — S46.912D STRAIN OF LEFT SHOULDER, SUBSEQUENT ENCOUNTER: ICD-10-CM

## 2022-03-19 DIAGNOSIS — N39.0 URINARY TRACT INFECTION WITH HEMATURIA, SITE UNSPECIFIED: Primary | ICD-10-CM

## 2022-03-19 DIAGNOSIS — R31.9 URINARY TRACT INFECTION WITH HEMATURIA, SITE UNSPECIFIED: Primary | ICD-10-CM

## 2022-03-19 LAB
-: ABNORMAL
BACTERIA: ABNORMAL
BILIRUBIN URINE: NEGATIVE
COLOR: YELLOW
EPITHELIAL CELLS UA: ABNORMAL /HPF (ref 0–5)
GLUCOSE URINE: NEGATIVE
HCG(URINE) PREGNANCY TEST: NEGATIVE
KETONES, URINE: NEGATIVE
LEUKOCYTE ESTERASE, URINE: ABNORMAL
NITRITE, URINE: NEGATIVE
OTHER OBSERVATIONS UA: ABNORMAL
PH UA: 7 (ref 5–8)
PROTEIN UA: NEGATIVE
RBC UA: ABNORMAL /HPF (ref 0–2)
SPECIFIC GRAVITY UA: 1.01 (ref 1–1.03)
TURBIDITY: ABNORMAL
URINE HGB: ABNORMAL
UROBILINOGEN, URINE: NORMAL
WBC UA: ABNORMAL /HPF (ref 0–5)

## 2022-03-19 PROCEDURE — 87186 SC STD MICRODIL/AGAR DIL: CPT

## 2022-03-19 PROCEDURE — 81001 URINALYSIS AUTO W/SCOPE: CPT

## 2022-03-19 PROCEDURE — 96372 THER/PROPH/DIAG INJ SC/IM: CPT

## 2022-03-19 PROCEDURE — 6360000002 HC RX W HCPCS: Performed by: REGISTERED NURSE

## 2022-03-19 PROCEDURE — 81025 URINE PREGNANCY TEST: CPT

## 2022-03-19 PROCEDURE — 87086 URINE CULTURE/COLONY COUNT: CPT

## 2022-03-19 PROCEDURE — 87088 URINE BACTERIA CULTURE: CPT

## 2022-03-19 PROCEDURE — 99282 EMERGENCY DEPT VISIT SF MDM: CPT

## 2022-03-19 PROCEDURE — 73030 X-RAY EXAM OF SHOULDER: CPT

## 2022-03-19 RX ORDER — CEPHALEXIN 500 MG/1
500 CAPSULE ORAL 2 TIMES DAILY
Qty: 14 CAPSULE | Refills: 0 | Status: SHIPPED | OUTPATIENT
Start: 2022-03-19 | End: 2022-03-26

## 2022-03-19 RX ORDER — FLUCONAZOLE 150 MG/1
150 TABLET ORAL ONCE
Qty: 1 TABLET | Refills: 0 | Status: SHIPPED | OUTPATIENT
Start: 2022-03-19 | End: 2022-03-19

## 2022-03-19 RX ORDER — KETOROLAC TROMETHAMINE 30 MG/ML
30 INJECTION, SOLUTION INTRAMUSCULAR; INTRAVENOUS ONCE
Status: COMPLETED | OUTPATIENT
Start: 2022-03-19 | End: 2022-03-19

## 2022-03-19 RX ADMIN — KETOROLAC TROMETHAMINE 30 MG: 30 INJECTION, SOLUTION INTRAMUSCULAR at 14:54

## 2022-03-19 ASSESSMENT — ENCOUNTER SYMPTOMS
GASTROINTESTINAL NEGATIVE: 1
EYES NEGATIVE: 1
RESPIRATORY NEGATIVE: 1

## 2022-03-19 ASSESSMENT — PAIN SCALES - GENERAL
PAINLEVEL_OUTOF10: 6
PAINLEVEL_OUTOF10: 9

## 2022-03-19 NOTE — ED PROVIDER NOTES
[facility]  [address] [facility]  Phone: [phone]        Pt Name: Azul Arechiga  MRN: [id number]  [place] 1985  Date of evaluation: 3/19/22    CHIEFCOMPLAINT       Chief Complaint   Patient presents with    Shoulder Pain     injury happened in august of last year, had MRI and xrays     Urinary Tract Infection     swelling, urinary burning, frequency       HISTORY OF PRESENT ILLNESS (Location/Symptom, Timing/Onset, Context/Setting, Quality, Duration, Modifying Factors, Severity)      Azul Arechiga is a 39 y.o. female with no pertinent PMH who presents to the ED via private auto with chronic left shoulder pain and dysuria. Patient reports that she has been currently being treated for a trapezius strain and left shoulder pain and is currently waiting for an MRI of her left shoulder. She states that recently at work she started developing more left shoulder pain with lifting and straining. She also states that today she started developing some dysuria with suprapubic tenderness that she states is her \"typical UTI\"  presentation. She denies any fevers or chills, chest pain, shortness of breath, abdominal pain, nausea vomiting or diarrhea. She has not take any medications prior arrival for her symptoms. PAST MEDICAL / SURGICAL / SOCIAL / FAMILY HISTORY     PMH:  has a past medical history of Back pain, Kidney infection, Kidney stone, Migraines, and Ovarian cyst.  Surgical History:  has a past surgical history that includes Tubal ligation; Dilation and curettage of uterus; Endometrial ablation; and Hysterectomy. Social History:  reports that she has never smoked. She has never used smokeless tobacco. She reports that she does not drink alcohol and does not use drugs. Family History: has no family status information on file. family history is not on file.   Psychiatric History: None    Allergies: Latex, Imitrex [sumatriptan], Pcn [penicillins], Zoloft [sertraline hcl], and Zomig [zolmitriptan]    Home Medications:   Prior to Admission medications    Medication Sig Start Date End Date Taking? Authorizing Provider   cephALEXin (KEFLEX) 500 MG capsule Take 1 capsule by mouth 2 times daily for 7 days 3/19/22 3/26/22 Yes Alcus Kathi Meridieth, APRN - CNP   fluconazole (DIFLUCAN) 150 MG tablet Take 1 tablet by mouth once for 1 dose After finishing oral antibiotics. 3/19/22 3/19/22 Yes Alcus Dykes Meridieth, APRN - CNP   acetaminophen-codeine (TYLENOL #3) 300-30 MG per tablet TAKE 1 TABLET BY MOUTH EVERY FOUR HOURS AS NEEDED FOR PAIN 11/20/21   Historical Provider, MD   ondansetron (ZOFRAN ODT) 4 MG disintegrating tablet Take 1 tablet by mouth every 8 hours as needed for Nausea 3/1/20   Lawrence Murray MD   ibuprofen (ADVIL;MOTRIN) 800 MG tablet Take 1 tablet by mouth every 8 hours as needed for Pain 5/16/19   Wilfrid Sanchez MD   naproxen (NAPROSYN) 500 MG tablet Take 1 tablet by mouth 2 times daily 11/18/18   Jaqueline Tucker MD   Cyanocobalamin (VITAMIN B 12 PO) Take by mouth 2 times daily    Historical Provider, MD   acetaminophen (TYLENOL) 500 MG tablet Take 1,000 mg by mouth every 6 hours as needed for Pain. Historical Provider, MD   ibuprofen (ADVIL;MOTRIN) 800 MG tablet Take 1 tablet by mouth every 8 hours as needed for Pain. 7/3/14   Alena Cantu MD       REVIEW OF SYSTEMS  (2-9 systems for level 4, 10 ormore for level 5)      Review of Systems   Constitutional: Negative. HENT: Negative. Eyes: Negative. Respiratory: Negative. Cardiovascular: Negative. Gastrointestinal: Negative. Genitourinary: Positive for dysuria, pelvic pain and urgency. Negative for flank pain, vaginal bleeding, vaginal discharge and vaginal pain. Musculoskeletal: Positive for arthralgias. Skin: Negative. Neurological: Negative. All other systems negative except as marked.      PHYSICAL EXAM  (up to 7 for level 4, 8 or more for level 5)      INITIAL VITALS:  height is 5' 3\" (1.6 m) and weight is 104.3 kg (230 lb). Her oral temperature is 98.6 °F (37 °C). Her blood pressure is 148/101 (abnormal) and her pulse is 110. Her respiration is 16 and oxygen saturation is 97%. Vital signs reviewed. Physical Exam  Constitutional:       General: She is not in acute distress. Appearance: Normal appearance. She is not toxic-appearing. HENT:      Head: Normocephalic and atraumatic. Cardiovascular:      Rate and Rhythm: Normal rate and regular rhythm. Pulses: Normal pulses. Heart sounds: Normal heart sounds. Pulmonary:      Effort: Pulmonary effort is normal.      Breath sounds: Normal breath sounds. Abdominal:      General: Abdomen is flat. Palpations: Abdomen is soft. There is no hepatomegaly or splenomegaly. Tenderness: There is abdominal tenderness in the suprapubic area. There is no right CVA tenderness, left CVA tenderness or guarding. Negative signs include Grewal's sign and McBurney's sign. Hernia: No hernia is present. Musculoskeletal:         General: Normal range of motion. Right shoulder: Normal.      Left shoulder: Tenderness present. No deformity or crepitus. Normal range of motion. Normal strength. Normal pulse. Right elbow: Normal.      Left elbow: Normal.      Right wrist: Normal.      Left wrist: Normal.      Right hand: Normal.      Left hand: Normal.      Cervical back: Normal range of motion and neck supple. No rigidity or tenderness. Right lower leg: No edema. Left lower leg: No edema. Comments: Patients is neurovascularly intact. Able to give OK sign, thumbs, up, and spread fingers against resistance bilaterally. Sensation intact to palmar aspect of index finger, webbing between fingers, and pinky finger bilaterally. Bilateral radial pulses 2+   Skin:     General: Skin is warm and dry. Capillary Refill: Capillary refill takes less than 2 seconds.    Neurological:      General: No focal deficit Department immediately if new or worsening symptoms occur. We have discussed the symptoms which are most concerning (e.g., abdominal or back pain, fever, a feeling of passing out, light headed, dizziness, chest pain, shortness of breath, persistent nausea and/or vomiting, numbness or weakness to the arms or legs, coolness or color change of the arms or legs) that necessitate immediate return. The patient understands that at this time there is no evidence for a more malignant underlying process, but the patient also understands that early in the process of an illness or injury, an emergency department workup can be falsely reassuring. Routine discharge counseling was given, and the patient understands that worsening, changing or persistent symptoms should prompt an immediate call or follow up with their primary physician or return to the emergency department. The importance of appropriate follow up was also discussed. I have reviewed the disposition diagnosis with the patient and or their family/guardian. I have answered their questions and given discharge instructions. They voiced understanding of these instructions and did not have any further questions or complaints. PLAN (LABS / IMAGING / EKG):  Orders Placed This Encounter   Procedures    Culture, Urine    XR SHOULDER LEFT (MIN 2 VIEWS)    Urinalysis with Reflex to Culture    Pregnancy, Urine    Microscopic Urinalysis       MEDICATIONS ORDERED:  Orders Placed This Encounter   Medications    ketorolac (TORADOL) injection 30 mg    cephALEXin (KEFLEX) 500 MG capsule     Sig: Take 1 capsule by mouth 2 times daily for 7 days     Dispense:  14 capsule     Refill:  0    fluconazole (DIFLUCAN) 150 MG tablet     Sig: Take 1 tablet by mouth once for 1 dose After finishing oral antibiotics.      Dispense:  1 tablet     Refill:  0       Controlled Substances Monitoring:     DIAGNOSTIC RESULTS     EKG: All EKG's are interpreted by the Emergency Department Physician who either signs or Co-signs this chart in the [address] a cardiologist.        RADIOLOGY: All images are read by the radiologist and their interpretations are reviewed. XR SHOULDER LEFT (MIN 2 VIEWS)   Final Result   No acute fracture or dislocation. No results found. LABS:  Results for orders placed or performed during the hospital encounter of 03/19/22   Urinalysis with Reflex to Culture    Specimen: Urine   Result Value Ref Range    Color, UA Yellow Yellow    Turbidity UA SLIGHTLY CLOUDY (A) Clear    Glucose, Ur NEGATIVE NEGATIVE    Bilirubin Urine NEGATIVE NEGATIVE    Ketones, Urine NEGATIVE NEGATIVE    Specific Gravity, UA 1.010 1.005 - 1.030    Urine Hgb TRACE (A) NEGATIVE    pH, UA 7.0 5.0 - 8.0    Protein, UA NEGATIVE NEGATIVE    Urobilinogen, Urine Normal Normal    Nitrite, Urine NEGATIVE NEGATIVE    Leukocyte Esterase, Urine MODERATE (A) NEGATIVE   Pregnancy, Urine   Result Value Ref Range    HCG(Urine) Pregnancy Test NEGATIVE NEGATIVE   Microscopic Urinalysis   Result Value Ref Range    -          WBC, UA TOO NUMEROUS TO COUNT 0 - 5 /HPF    RBC, UA 0 TO 2 0 - 2 /HPF    Epithelial Cells UA 5 TO 10 0 - 5 /HPF    Bacteria, UA MODERATE (A) None    Other Observations UA Culture ordered based on defined criteria. (A) NOT REQ. EMERGENCY DEPARTMENT COURSE           Vitals:    Vitals:    03/19/22 1400 03/19/22 1458   BP: (!) 148/101    Pulse: 110    Resp: 16    Temp:  98.6 °F (37 °C)   TempSrc:  Oral   SpO2: 97%    Weight: 104.3 kg (230 lb)    Height: 5' 3\" (1.6 m)      -------------------------  BP: (!) 148/101, Temp: 98.6 °F (37 °C), Pulse: 110, Resp: 16      RE-EVALUATION:  See ED Course notes above. CONSULTS:  None    PROCEDURES:  None    FINAL IMPRESSION      1. Urinary tract infection with hematuria, site unspecified    2. Strain of left shoulder, subsequent encounter          DISPOSITION / PLAN     CONDITION ON DISPOSITION:   Stable for discharge. PATIENT REFERRED TO:  [facility]  [address] [facility] [address]  [phone]    If symptoms worsen      Please call your PCP in one day for follow up. If you do not have a PCP you can Call [phone] ([phone]) to be established with a PCP.           DISCHARGE MEDICATIONS:  Discharge Medication List as of 3/19/2022  2:46 PM      START taking these medications    Details   cephALEXin (KEFLEX) 500 MG capsule Take 1 capsule by mouth 2 times daily for 7 days, Disp-14 capsule, R-0Normal             NITA Smith CNP   Emergency Medicine Nurse Practitioner    (Please note that portions of this note were completed with a voice recognition program.  Efforts were made to edit the dictations but occasionally words aremis-transcribed.)       NITA Smith CNP  03/19/22 5350

## 2022-03-19 NOTE — ED PROVIDER NOTES
Emergency Department             I reviewed the mid level provider's note and agree with the documented findings and we have discussed the plan of care. I have reviewed the emergency nurses triage note. I agree with the chief complaint, past medical history, past surgical history, allergies, medications, social and family history as documented unless otherwise noted below.      Kalina Welch DO  03/19/22 1433

## 2022-03-21 ENCOUNTER — PATIENT MESSAGE (OUTPATIENT)
Dept: ORTHOPEDIC SURGERY | Age: 37
End: 2022-03-21

## 2022-03-21 LAB
CULTURE: ABNORMAL
SPECIMEN DESCRIPTION: ABNORMAL

## 2022-03-21 RX ORDER — GABAPENTIN 300 MG/1
300 CAPSULE ORAL 3 TIMES DAILY
Qty: 90 CAPSULE | Refills: 0 | Status: SHIPPED | OUTPATIENT
Start: 2022-03-21 | End: 2022-05-31

## 2022-03-21 NOTE — TELEPHONE ENCOUNTER
Dr. Jeison Harrison patient would like gabapentin. We have not given this prescription in the past. Patient is requesting gabapentin for her neck and should pain. Patient does not have a PCP. Would you refill in Dr. Jeison Harrison absence or wait till he gets back?

## 2022-04-06 ENCOUNTER — OFFICE VISIT (OUTPATIENT)
Dept: ORTHOPEDIC SURGERY | Age: 37
End: 2022-04-06
Payer: COMMERCIAL

## 2022-04-06 VITALS — WEIGHT: 230 LBS | BODY MASS INDEX: 40.75 KG/M2 | HEIGHT: 63 IN
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
## 2022-04-06 DIAGNOSIS — S16.1XXA REPETITIVE STRAIN INJURY OF CERVICAL SPINE, INITIAL ENCOUNTER: Primary | ICD-10-CM

## 2022-04-06 PROCEDURE — 99213 OFFICE O/P EST LOW 20 MIN: CPT | Performed by: ORTHOPAEDIC SURGERY

## 2022-04-06 NOTE — LETTER
[facility]  [address] [facility] [address]  Phone: [phone]  Fax: [phone]    Elisabet Christiansen MD        April 6, 2022     Patient: Negrito Cary   YOB: 1985   Date of Visit: 4/6/2022       To Whom It May Concern: It is my medical opinion that Luke Lazaro may return to work on 4/7/22 and maintain current work restrictions. If you have any questions or concerns, please don't hesitate to call.     Sincerely,      Elisabet Christiansen MD

## 2022-04-06 NOTE — PROGRESS NOTES
Orthopedic Shoulder Encounter Note     Chief complaint: left shoulder pain    HPI: Judie Medina is a 39 y.o. presenting for evaluation of her left shoulder and neck. She indicates that about 8 months ago in August 2021 while at work for [employer] she pushed the cart out of her way and when she brought her left arm back from doing so she did so forcefully and struck the back of her arm against a steel rack behind her. She had immediate pain in the posterior aspect of her neck and shoulder and went to the urgent care couple days later as a result of persistent pain. She was diagnosed with a trapezial strain and contusion and ultimately was sent to physical therapy. She has been seen Dr. Lyla Mcgee for this issue and received a cortisone injection in October 2021 to her left shoulder administered by Dr. Lyla Mcgee. She states that therapy and injection has failed to provide substantial pain relief. She continues to have significant pain and limitation in her neck. Most of her pain is primarily localized to the posterosuperior aspect of the right shoulder and neck. Is exacerbated by turning her neck especially to the right. Previous treatment:    NSAIDs: Naproxen, ibuprofen    Physical Therapy: Yes    Injections: Left shoulder cortisone injection on 10/14/2021 by Dr. Lyla Mcgee    Surgeries: None    Review of Systems:   Constitutional: Negative for fever, chills, sweats. Pain Score:  10 - Worst pain ever  Neurological: Negative for headache, numbness, or weakness. Musculoskeletal: As noted in HPI     Past Medical History  Aden Gan  has a past medical history of Back pain, Kidney infection, Kidney stone, Migraines, and Ovarian cyst.    Past Surgical History  Aden Gan  has a past surgical history that includes Tubal ligation; Dilation and curettage of uterus; Endometrial ablation; and Hysterectomy.     Current Medications  Current Outpatient Medications   Medication Sig Dispense Refill    gabapentin (NEURONTIN) 300 MG capsule Take 1 capsule by mouth three times daily for 30 days. 90 capsule 0    acetaminophen-codeine (TYLENOL #3) 300-30 MG per tablet TAKE 1 TABLET BY MOUTH EVERY FOUR HOURS AS NEEDED FOR PAIN      ondansetron (ZOFRAN ODT) 4 MG disintegrating tablet Take 1 tablet by mouth every 8 hours as needed for Nausea 10 tablet 0    ibuprofen (ADVIL;MOTRIN) 800 MG tablet Take 1 tablet by mouth every 8 hours as needed for Pain (Patient not taking: Reported on 4/6/2022) 30 tablet 0    naproxen (NAPROSYN) 500 MG tablet Take 1 tablet by mouth 2 times daily 12 tablet 0    Cyanocobalamin (VITAMIN B 12 PO) Take by mouth 2 times daily      acetaminophen (TYLENOL) 500 MG tablet Take 1,000 mg by mouth every 6 hours as needed for Pain.  ibuprofen (ADVIL;MOTRIN) 800 MG tablet Take 1 tablet by mouth every 8 hours as needed for Pain. (Patient not taking: Reported on 4/6/2022) 30 tablet 0     No current facility-administered medications for this visit. Allergies  Allergies have been reviewed. Kamar Mane is allergic to latex, imitrex [sumatriptan], pcn [penicillins], zoloft [sertraline hcl], and zomig [zolmitriptan]. Social History  Kamar Mane  reports that she has never smoked. She has never used smokeless tobacco. She reports that she does not drink alcohol and does not use drugs. Family History  Thania's family history is not on file. Physical Exam:     Ht 5' 3\" (1.6 m)   Wt 230 lb (104.3 kg)   LMP 09/16/2017   BMI 40.74 kg/m²    Constitutional: Patient is oriented to person, place, and time. Patient appears well-developed and well nourished. Mental Status/psychiatric: Behavior is normal. Thought content normal.  HENT: Negative otherwise noted  Head: Normocephalic and Atraumatic  Nose: Normal  Respiratory/Cardio: Effort normal. No respiratory distress. Neck: Limited range of motion through the cervical spine with flexion extension as well as rotation to either side.   She is tender to palpation along the left paraspinals and along the trapezius muscle on the side. Shoulder:    Skin: Skin is warm and dry; no swelling or obvious muscular atrophy. Vasculature: 2+ radial pulses bilaterally  Neuro: Sensation grossly intact to light touch diffusely  Tenderness: Tender to palpation over the posterior aspect of the left shoulder    ROM: (Degrees)    Right   A P   Left   A P    Elevation  150    Elevation  45 150  Abduction  140    Abduction  50  145  ER   30    ER   30 75  IR   T12    IR   Buttock   90 abd/ER      90 abd/ER     90 abd/IR      90 abd/IR     Crepitation  No    Crepitation No  Dyskenesia  No    Dyskenesia No      Muscle strength:    Right       Left    Deltoid   5    Deltoid   5  Supraspinatus  5    Supraspinatus  5  ER   5    ER   5  IR   5    IR   5    Special tests    Right   Rotator Cuff    Left    n   Painful arc    y   n   Pain with ER    y    n   Neer's     y    n   Hawkin's    y    n   Drop Arm    n  n   Lift off/Belly Press   n  n   ER Lag    n          TRISTAR [facility] Joint  n   AC tenderness   n  n   Cross-chest adduction  n       Labrum/biceps    n   Briscoe's    n   n   Biceps sheer    n      n   Speed's/Yergason's   n    n   Tenderness Biceps Groove  n    n   Dax's    n         Instability  n   Ant Apprehension   n    n   Post Apprehension   n    n   Ant Load shift    n    n   Post Load shift   n   n   Sulcus     n  n   Generalized Laxity   n  n   Relocation test   n  n   Crank test     n  n   Cuate-superior escape  n       Imaging:  3 x-ray views of the left shoulder completed on 3/19/2022 were reviewed independently demonstrating normal glenohumeral and acromioclavicular joint spaces with a type II acromion. No obvious fracture, dislocation, subluxation or notable osseous abnormality. Impression/Plan: Velma Burr is a 39 y.o. old female with pain localized primarily to the posterior aspect of the shoulder as well as trapezius muscle and left cervical paraspinals.   She has significantly limited range of motion in her neck as a result of this pain. We had a discussion about possible etiologies. An MRI of her cervical spine fails to demonstrate significant structural damage. But I believe this to be the primary issue and so I did recommend further evaluation by spine surgeon. She would like to see someone other than Dr. Divine Rebolledo at this time and so referral to see Dr. La Murphy was placed. She was maintained on her current work restrictions and I will see her back in my clinic as needed. This note is created with the assistance of a speech recognition program.  While intending to generate adocument that actually reflects the content of the visit, the document can still have some errors including those of syntax and sound a like substitutions which may escape proof reading. It such instances, actual meaningcan be extrapolated by contextual diversion.     NA = Not assessed  RTC = Rotator cuff  RCT = Rotator cuff tear  ER = External rotation  IR = Internal rotation  AC = Acromioclavicular  GH = Glenohumeral  n = No  y = Yes
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
## 2022-04-08 DIAGNOSIS — M75.42 SHOULDER IMPINGEMENT SYNDROME, LEFT: ICD-10-CM

## 2022-04-08 DIAGNOSIS — S16.1XXA REPETITIVE STRAIN INJURY OF CERVICAL SPINE, INITIAL ENCOUNTER: Primary | ICD-10-CM

## 2022-04-08 DIAGNOSIS — M54.12 CERVICAL RADICULAR PAIN: ICD-10-CM

## 2022-04-14 ENCOUNTER — HOSPITAL ENCOUNTER (OUTPATIENT)
Dept: MRI IMAGING | Facility: CLINIC | Age: 37
Discharge: HOME | End: 2022-04-16

## 2022-04-14 DIAGNOSIS — M25.512 ACUTE PAIN OF LEFT SHOULDER: ICD-10-CM

## 2022-04-14 DIAGNOSIS — M75.42 SHOULDER IMPINGEMENT SYNDROME, LEFT: ICD-10-CM

## 2022-04-15 ENCOUNTER — OFFICE VISIT (OUTPATIENT)
Dept: PRIMARY CARE CLINIC | Age: 37
End: 2022-04-15
Payer: COMMERCIAL

## 2022-04-15 VITALS
WEIGHT: 257 LBS | OXYGEN SATURATION: 98 % | HEART RATE: 91 BPM | BODY MASS INDEX: 43.87 KG/M2 | HEIGHT: 64 IN | SYSTOLIC BLOOD PRESSURE: 122 MMHG | RESPIRATION RATE: 16 BRPM | DIASTOLIC BLOOD PRESSURE: 76 MMHG

## 2022-04-15 DIAGNOSIS — S16.1XXS REPETITIVE STRAIN INJURY OF CERVICAL SPINE, SEQUELA: ICD-10-CM

## 2022-04-15 DIAGNOSIS — Z13.6 ENCOUNTER FOR LIPID SCREENING FOR CARDIOVASCULAR DISEASE: ICD-10-CM
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
## 2022-04-15 DIAGNOSIS — Z13.29 SCREENING FOR THYROID DISORDER: ICD-10-CM

## 2022-04-15 DIAGNOSIS — Z76.89 ENCOUNTER TO ESTABLISH CARE: Primary | ICD-10-CM

## 2022-04-15 DIAGNOSIS — F33.1 MODERATE EPISODE OF RECURRENT MAJOR DEPRESSIVE DISORDER (HCC): ICD-10-CM

## 2022-04-15 DIAGNOSIS — F43.10 PTSD (POST-TRAUMATIC STRESS DISORDER): ICD-10-CM

## 2022-04-15 DIAGNOSIS — Z59.6: ICD-10-CM

## 2022-04-15 DIAGNOSIS — Z13.220 ENCOUNTER FOR LIPID SCREENING FOR CARDIOVASCULAR DISEASE: ICD-10-CM

## 2022-04-15 DIAGNOSIS — Z13.0 SCREENING FOR DEFICIENCY ANEMIA: ICD-10-CM

## 2022-04-15 DIAGNOSIS — Z13.1 SCREENING FOR DIABETES MELLITUS: ICD-10-CM

## 2022-04-15 DIAGNOSIS — E53.8 VITAMIN B12 DEFICIENCY: ICD-10-CM

## 2022-04-15 DIAGNOSIS — E55.9 VITAMIN D DEFICIENCY: ICD-10-CM

## 2022-04-15 PROCEDURE — 99204 OFFICE O/P NEW MOD 45 MIN: CPT | Performed by: PHYSICIAN ASSISTANT

## 2022-04-15 RX ORDER — TIZANIDINE 2 MG/1
2 TABLET ORAL 3 TIMES DAILY PRN
Qty: 15 TABLET | Refills: 0 | Status: SHIPPED | OUTPATIENT
Start: 2022-04-15 | End: 2022-04-28

## 2022-04-15 RX ORDER — MELOXICAM 7.5 MG/1
7.5 TABLET ORAL DAILY PRN
Qty: 30 TABLET | Refills: 0 | Status: SHIPPED | OUTPATIENT
Start: 2022-04-15 | End: 2022-04-28 | Stop reason: SDUPTHER

## 2022-04-15 RX ORDER — DULOXETIN HYDROCHLORIDE 30 MG/1
30 CAPSULE, DELAYED RELEASE ORAL DAILY
Qty: 30 CAPSULE | Refills: 0 | Status: SHIPPED | OUTPATIENT
Start: 2022-04-15 | End: 2022-04-28

## 2022-04-15 SDOH — ECONOMIC STABILITY: FOOD INSECURITY: WITHIN THE PAST 12 MONTHS, THE FOOD YOU BOUGHT JUST DIDN'T LAST AND YOU DIDN'T HAVE MONEY TO GET MORE.: OFTEN TRUE

## 2022-04-15 SDOH — ECONOMIC STABILITY - INCOME SECURITY: LOW INCOME: Z59.6

## 2022-04-15 SDOH — ECONOMIC STABILITY: TRANSPORTATION INSECURITY
IN THE PAST 12 MONTHS, HAS LACK OF TRANSPORTATION KEPT YOU FROM MEETINGS, WORK, OR FROM GETTING THINGS NEEDED FOR DAILY LIVING?: NO

## 2022-04-15 SDOH — ECONOMIC STABILITY: FOOD INSECURITY: WITHIN THE PAST 12 MONTHS, YOU WORRIED THAT YOUR FOOD WOULD RUN OUT BEFORE YOU GOT MONEY TO BUY MORE.: OFTEN TRUE

## 2022-04-15 SDOH — ECONOMIC STABILITY: TRANSPORTATION INSECURITY
IN THE PAST 12 MONTHS, HAS THE LACK OF TRANSPORTATION KEPT YOU FROM MEDICAL APPOINTMENTS OR FROM GETTING MEDICATIONS?: NO

## 2022-04-15 ASSESSMENT — PATIENT HEALTH QUESTIONNAIRE - PHQ9
3. TROUBLE FALLING OR STAYING ASLEEP: 3
7. TROUBLE CONCENTRATING ON THINGS, SUCH AS READING THE NEWSPAPER OR WATCHING TELEVISION: 1
SUM OF ALL RESPONSES TO PHQ QUESTIONS 1-9: 21
8. MOVING OR SPEAKING SO SLOWLY THAT OTHER PEOPLE COULD HAVE NOTICED. OR THE OPPOSITE, BEING SO FIGETY OR RESTLESS THAT YOU HAVE BEEN MOVING AROUND A LOT MORE THAN USUAL: 3
SUM OF ALL RESPONSES TO PHQ QUESTIONS 1-9: 20
SUM OF ALL RESPONSES TO PHQ QUESTIONS 1-9: 21
2. FEELING DOWN, DEPRESSED OR HOPELESS: 3
5. POOR APPETITE OR OVEREATING: 1
4. FEELING TIRED OR HAVING LITTLE ENERGY: 3
6. FEELING BAD ABOUT YOURSELF - OR THAT YOU ARE A FAILURE OR HAVE LET YOURSELF OR YOUR FAMILY DOWN: 3
SUM OF ALL RESPONSES TO PHQ9 QUESTIONS 1 & 2: 6
1. LITTLE INTEREST OR PLEASURE IN DOING THINGS: 3
SUM OF ALL RESPONSES TO PHQ QUESTIONS 1-9: 21
10. IF YOU CHECKED OFF ANY PROBLEMS, HOW DIFFICULT HAVE THESE PROBLEMS MADE IT FOR YOU TO DO YOUR WORK, TAKE CARE OF THINGS AT HOME, OR GET ALONG WITH OTHER PEOPLE: 1
9. THOUGHTS THAT YOU WOULD BE BETTER OFF DEAD, OR OF HURTING YOURSELF: 1

## 2022-04-15 ASSESSMENT — COLUMBIA-SUICIDE SEVERITY RATING SCALE - C-SSRS
6. HAVE YOU EVER DONE ANYTHING, STARTED TO DO ANYTHING, OR PREPARED TO DO ANYTHING TO END YOUR LIFE?: NO
1. WITHIN THE PAST MONTH, HAVE YOU WISHED YOU WERE DEAD OR WISHED YOU COULD GO TO SLEEP AND NOT WAKE UP?: YES
2. HAVE YOU ACTUALLY HAD ANY THOUGHTS OF KILLING YOURSELF?: NO

## 2022-04-15 ASSESSMENT — ENCOUNTER SYMPTOMS
SHORTNESS OF BREATH: 0
DIARRHEA: 0
COUGH: 0
VOMITING: 0
CONSTIPATION: 0
NAUSEA: 0
RHINORRHEA: 0
SINUS PAIN: 0
ABDOMINAL PAIN: 0
BACK PAIN: 0

## 2022-04-15 ASSESSMENT — SOCIAL DETERMINANTS OF HEALTH (SDOH): HOW HARD IS IT FOR YOU TO PAY FOR THE VERY BASICS LIKE FOOD, HOUSING, MEDICAL CARE, AND HEATING?: VERY HARD
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
## 2022-04-18 ENCOUNTER — TELEPHONE (OUTPATIENT)
Dept: FAMILY MEDICINE CLINIC | Age: 37
End: 2022-04-18

## 2022-04-18 ENCOUNTER — PATIENT MESSAGE (OUTPATIENT)
Dept: PRIMARY CARE CLINIC | Age: 37
End: 2022-04-18

## 2022-04-18 DIAGNOSIS — G47.00 INSOMNIA, UNSPECIFIED TYPE: Primary | ICD-10-CM

## 2022-04-18 RX ORDER — HYDROXYZINE HYDROCHLORIDE 25 MG/1
25 TABLET, FILM COATED ORAL NIGHTLY
Qty: 30 TABLET | Refills: 0 | Status: SHIPPED | OUTPATIENT
Start: 2022-04-18 | End: 2022-05-18

## 2022-04-18 NOTE — TELEPHONE ENCOUNTER
From: Elva Breen  To: [place] [id number] [address]: 4/18/2022 10:28 AM EDT  Subject: Sleep    Is there anything I can take to help my insomnia from the cymbalta? I've only slept 12 hours since Friday. I took melatonin Fri night then unisom the last two nights but couldn't fall asleep and if I did I'd only sleep an hour. I'm taking the cymbalta in the morning after not sleeping Friday night hoping I could sleep. So far that's been the only side effect.

## 2022-04-18 NOTE — TELEPHONE ENCOUNTER
Florencio Wilson was contacted by Autumn Peñaloza, a [employer], regarding a Social Determinants of Health referral.     A voicemail message was left with the writer's contact information. First contact attempt.
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
## 2022-04-19 ENCOUNTER — HOSPITAL ENCOUNTER (OUTPATIENT)
Age: 37
Setting detail: SPECIMEN
Discharge: HOME | End: 2022-04-19

## 2022-04-19 DIAGNOSIS — S16.1XXS REPETITIVE STRAIN INJURY OF CERVICAL SPINE, SEQUELA: ICD-10-CM

## 2022-04-19 DIAGNOSIS — Z13.220 ENCOUNTER FOR LIPID SCREENING FOR CARDIOVASCULAR DISEASE: ICD-10-CM

## 2022-04-19 DIAGNOSIS — Z13.6 ENCOUNTER FOR LIPID SCREENING FOR CARDIOVASCULAR DISEASE: ICD-10-CM

## 2022-04-19 DIAGNOSIS — Z13.29 SCREENING FOR THYROID DISORDER: ICD-10-CM

## 2022-04-19 DIAGNOSIS — Z13.0 SCREENING FOR DEFICIENCY ANEMIA: ICD-10-CM

## 2022-04-19 DIAGNOSIS — E53.8 VITAMIN B12 DEFICIENCY: ICD-10-CM
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
## 2022-04-19 DIAGNOSIS — E55.9 VITAMIN D DEFICIENCY: ICD-10-CM

## 2022-04-19 DIAGNOSIS — Z13.1 SCREENING FOR DIABETES MELLITUS: ICD-10-CM

## 2022-04-19 LAB
ABSOLUTE EOS #: 0.17 K/UL (ref 0–0.44)
ABSOLUTE IMMATURE GRANULOCYTE: 0.11 K/UL (ref 0–0.3)
ABSOLUTE LYMPH #: 3.06 K/UL (ref 1.1–3.7)
ABSOLUTE MONO #: 0.5 K/UL (ref 0.1–1.2)
ALBUMIN SERPL-MCNC: 4.4 G/DL (ref 3.5–5.2)
ALBUMIN/GLOBULIN RATIO: 1.3 (ref 1–2.5)
ALP BLD-CCNC: 92 U/L (ref 35–104)
ALT SERPL-CCNC: 35 U/L (ref 5–33)
ANION GAP SERPL CALCULATED.3IONS-SCNC: 15 MMOL/L (ref 9–17)
AST SERPL-CCNC: 21 U/L
BASOPHILS # BLD: 1 % (ref 0–2)
BASOPHILS ABSOLUTE: 0.06 K/UL (ref 0–0.2)
BILIRUB SERPL-MCNC: 0.92 MG/DL (ref 0.3–1.2)
BUN BLDV-MCNC: 10 MG/DL (ref 6–20)
C-REACTIVE PROTEIN: 13.1 MG/L (ref 0–5)
CALCIUM SERPL-MCNC: 9.3 MG/DL (ref 8.6–10.4)
CHLORIDE BLD-SCNC: 101 MMOL/L (ref 98–107)
CHOLESTEROL/HDL RATIO: 4.4
CHOLESTEROL: 228 MG/DL
CO2: 22 MMOL/L (ref 20–31)
CREAT SERPL-MCNC: 0.61 MG/DL (ref 0.5–0.9)
EOSINOPHILS RELATIVE PERCENT: 2 % (ref 1–4)
ESTIMATED AVERAGE GLUCOSE: 103 MG/DL
FOLATE: 13.9 NG/ML
GFR AFRICAN AMERICAN: >60 ML/MIN
GFR NON-AFRICAN AMERICAN: >60 ML/MIN
GFR SERPL CREATININE-BSD FRML MDRD: ABNORMAL ML/MIN/{1.73_M2}
GLUCOSE BLD-MCNC: 87 MG/DL (ref 70–99)
HBA1C MFR BLD: 5.2 % (ref 4–6)
HCT VFR BLD CALC: 42.8 % (ref 36.3–47.1)
HDLC SERPL-MCNC: 52 MG/DL
HEMOGLOBIN: 14.2 G/DL (ref 11.9–15.1)
IMMATURE GRANULOCYTES: 1 %
LDL CHOLESTEROL: 142 MG/DL (ref 0–130)
LYMPHOCYTES # BLD: 33 % (ref 24–43)
MCH RBC QN AUTO: 31.1 PG (ref 25.2–33.5)
MCHC RBC AUTO-ENTMCNC: 33.2 G/DL (ref 28.4–34.8)
MCV RBC AUTO: 93.7 FL (ref 82.6–102.9)
MONOCYTES # BLD: 5 % (ref 3–12)
NRBC AUTOMATED: 0 PER 100 WBC
PDW BLD-RTO: 12.8 % (ref 11.8–14.4)
PLATELET # BLD: 315 K/UL (ref 138–453)
PMV BLD AUTO: 10.4 FL (ref 8.1–13.5)
POTASSIUM SERPL-SCNC: 4.3 MMOL/L (ref 3.7–5.3)
RBC # BLD: 4.57 M/UL (ref 3.95–5.11)
SEG NEUTROPHILS: 58 % (ref 36–65)
SEGMENTED NEUTROPHILS ABSOLUTE COUNT: 5.48 K/UL (ref 1.5–8.1)
SODIUM BLD-SCNC: 138 MMOL/L (ref 135–144)
TOTAL PROTEIN: 7.7 G/DL (ref 6.4–8.3)
TRIGL SERPL-MCNC: 168 MG/DL
TSH SERPL DL<=0.05 MIU/L-ACNC: 1.37 UIU/ML (ref 0.3–5)
VITAMIN B-12: 556 PG/ML (ref 232–1245)
VITAMIN D 25-HYDROXY: 16.9 NG/ML
WBC # BLD: 9.4 K/UL (ref 3.5–11.3)

## 2022-04-20 DIAGNOSIS — R79.82 ELEVATED C-REACTIVE PROTEIN (CRP): Primary | ICD-10-CM

## 2022-04-20 DIAGNOSIS — M25.50 ARTHRALGIA, UNSPECIFIED JOINT: ICD-10-CM

## 2022-04-26 ENCOUNTER — TELEPHONE (OUTPATIENT)
Dept: FAMILY MEDICINE CLINIC | Age: 37
End: 2022-04-26

## 2022-04-26 NOTE — TELEPHONE ENCOUNTER
Joseph Dea was contacted by Charles Noe, a [employer], regarding a Social Determinants of Health referral.     A voicemail message was left with the writer's contact information.     F/u contact attempt

## 2022-04-28 ENCOUNTER — OFFICE VISIT (OUTPATIENT)
Dept: PRIMARY CARE CLINIC | Age: 37
End: 2022-04-28
Payer: COMMERCIAL

## 2022-04-28 ENCOUNTER — HOSPITAL ENCOUNTER (OUTPATIENT)
Dept: MRI IMAGING | Facility: CLINIC | Age: 37
Discharge: HOME | End: 2022-04-30
Payer: COMMERCIAL

## 2022-04-28 VITALS
DIASTOLIC BLOOD PRESSURE: 88 MMHG | RESPIRATION RATE: 16 BRPM | BODY MASS INDEX: 44.54 KG/M2 | HEART RATE: 83 BPM | OXYGEN SATURATION: 97 % | HEIGHT: 63 IN | WEIGHT: 251.4 LBS | SYSTOLIC BLOOD PRESSURE: 130 MMHG

## 2022-04-28 DIAGNOSIS — S16.1XXS REPETITIVE STRAIN INJURY OF CERVICAL SPINE, SEQUELA: ICD-10-CM

## 2022-04-28 DIAGNOSIS — F33.1 MODERATE EPISODE OF RECURRENT MAJOR DEPRESSIVE DISORDER (HCC): Primary | ICD-10-CM

## 2022-04-28 DIAGNOSIS — F43.10 PTSD (POST-TRAUMATIC STRESS DISORDER): ICD-10-CM
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
## 2022-04-28 PROCEDURE — 73221 MRI JOINT UPR EXTREM W/O DYE: CPT

## 2022-04-28 PROCEDURE — 99214 OFFICE O/P EST MOD 30 MIN: CPT | Performed by: PHYSICIAN ASSISTANT

## 2022-04-28 RX ORDER — MELOXICAM 15 MG/1
15 TABLET ORAL DAILY PRN
Qty: 30 TABLET | Refills: 0 | Status: SHIPPED | OUTPATIENT
Start: 2022-04-28 | End: 2022-08-11 | Stop reason: SDUPTHER

## 2022-04-28 RX ORDER — BUPROPION HYDROCHLORIDE 150 MG/1
150 TABLET ORAL EVERY MORNING
Qty: 30 TABLET | Refills: 0 | Status: SHIPPED | OUTPATIENT
Start: 2022-04-28 | End: 2022-05-31

## 2022-04-28 ASSESSMENT — PATIENT HEALTH QUESTIONNAIRE - PHQ9
9. THOUGHTS THAT YOU WOULD BE BETTER OFF DEAD, OR OF HURTING YOURSELF: 1
4. FEELING TIRED OR HAVING LITTLE ENERGY: 3
SUM OF ALL RESPONSES TO PHQ QUESTIONS 1-9: 17
3. TROUBLE FALLING OR STAYING ASLEEP: 3
SUM OF ALL RESPONSES TO PHQ QUESTIONS 1-9: 17
8. MOVING OR SPEAKING SO SLOWLY THAT OTHER PEOPLE COULD HAVE NOTICED. OR THE OPPOSITE, BEING SO FIGETY OR RESTLESS THAT YOU HAVE BEEN MOVING AROUND A LOT MORE THAN USUAL: 3
2. FEELING DOWN, DEPRESSED OR HOPELESS: 1
10. IF YOU CHECKED OFF ANY PROBLEMS, HOW DIFFICULT HAVE THESE PROBLEMS MADE IT FOR YOU TO DO YOUR WORK, TAKE CARE OF THINGS AT HOME, OR GET ALONG WITH OTHER PEOPLE: 2
5. POOR APPETITE OR OVEREATING: 3
6. FEELING BAD ABOUT YOURSELF - OR THAT YOU ARE A FAILURE OR HAVE LET YOURSELF OR YOUR FAMILY DOWN: 1
1. LITTLE INTEREST OR PLEASURE IN DOING THINGS: 1
SUM OF ALL RESPONSES TO PHQ QUESTIONS 1-9: 17
SUM OF ALL RESPONSES TO PHQ9 QUESTIONS 1 & 2: 2
SUM OF ALL RESPONSES TO PHQ QUESTIONS 1-9: 16
7. TROUBLE CONCENTRATING ON THINGS, SUCH AS READING THE NEWSPAPER OR WATCHING TELEVISION: 1

## 2022-04-28 ASSESSMENT — COLUMBIA-SUICIDE SEVERITY RATING SCALE - C-SSRS
6. HAVE YOU EVER DONE ANYTHING, STARTED TO DO ANYTHING, OR PREPARED TO DO ANYTHING TO END YOUR LIFE?: NO
2. HAVE YOU ACTUALLY HAD ANY THOUGHTS OF KILLING YOURSELF?: NO
1. WITHIN THE PAST MONTH, HAVE YOU WISHED YOU WERE DEAD OR WISHED YOU COULD GO TO SLEEP AND NOT WAKE UP?: NO

## 2022-04-29 ASSESSMENT — ENCOUNTER SYMPTOMS
VOMITING: 0
DIARRHEA: 0
SINUS PAIN: 0
CONSTIPATION: 0
ABDOMINAL PAIN: 0
RHINORRHEA: 0
COUGH: 0
SHORTNESS OF BREATH: 0
BACK PAIN: 0
NAUSEA: 0

## 2022-05-04 ENCOUNTER — TELEPHONE (OUTPATIENT)
Dept: ORTHOPEDIC SURGERY | Age: 37
End: 2022-05-04

## 2022-05-05 ENCOUNTER — TELEPHONE (OUTPATIENT)
Dept: ORTHOPEDIC SURGERY | Age: 37
End: 2022-05-05

## 2022-05-05 ENCOUNTER — PATIENT MESSAGE (OUTPATIENT)
Dept: ORTHOPEDIC SURGERY | Age: 37
End: 2022-05-05

## 2022-05-05 ENCOUNTER — OFFICE VISIT (OUTPATIENT)
Dept: ORTHOPEDIC SURGERY | Age: 37
End: 2022-05-05
Payer: MEDICAID

## 2022-05-05 VITALS — RESPIRATION RATE: 16 BRPM | BODY MASS INDEX: 44.47 KG/M2 | HEIGHT: 63 IN | WEIGHT: 251 LBS
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
## 2022-05-05 DIAGNOSIS — M75.42 SHOULDER IMPINGEMENT SYNDROME, LEFT: ICD-10-CM

## 2022-05-05 DIAGNOSIS — M54.12 CERVICAL RADICULAR PAIN: ICD-10-CM

## 2022-05-05 DIAGNOSIS — S16.1XXA REPETITIVE STRAIN INJURY OF CERVICAL SPINE, INITIAL ENCOUNTER: Primary | ICD-10-CM

## 2022-05-05 DIAGNOSIS — M54.2 NECK PAIN: ICD-10-CM

## 2022-05-05 PROCEDURE — 99213 OFFICE O/P EST LOW 20 MIN: CPT | Performed by: ORTHOPAEDIC SURGERY

## 2022-05-05 NOTE — TELEPHONE ENCOUNTER
From: Jonathon Farrar  To: Dr. Anayeli Burgess: 5/5/2022 10:38 AM EDT  Subject: Referral    Can you fax the referral we discussed to [address] their fax number is [phone] Thank you.

## 2022-05-05 NOTE — PROGRESS NOTES
Patient ID: Stella Calvo is a 39 y.o. female    Chief Compliant:  Chief Complaint   Patient presents with    Shoulder Pain     left         Diagnostic imaging:    MRI cervical spine again reviewed age-appropriate no high-grade stenosis nerve impingement    MRI shoulder reviewed no high-grade rotator cuff issue    Assessment and Plan:  1. Repetitive strain injury of cervical spine, initial encounter    2. Cervical radicular pain    3. Shoulder impingement syndrome, left    4. Neck pain            Patient is not a surgical candidate at this time    Refer to occupational medicine for management of off work status etc.    Refer to pain management for cervical epidural steroid injections    Agree with follow with Dr. Hilda Mendoza for second opinion    Follow-up as needed    HPI:  This is a 39 y.o. female who presents to the clinic today for left shoulder and neck pain. Patient admits ongoing left neck pain radiating to the left arm. She has had multiple courses of PT with exacerbation of her pain. She has also had injections. Review of Systems   All other systems reviewed and are negative. Past History:    Current Outpatient Medications:     buPROPion (WELLBUTRIN XL) 150 MG extended release tablet, Take 1 tablet by mouth every morning, Disp: 30 tablet, Rfl: 0    meloxicam (MOBIC) 15 MG tablet, Take 1 tablet by mouth daily as needed for Pain, Disp: 30 tablet, Rfl: 0    hydrOXYzine (ATARAX) 25 MG tablet, Take 1 tablet by mouth nightly, Disp: 30 tablet, Rfl: 0    gabapentin (NEURONTIN) 300 MG capsule, Take 1 capsule by mouth three times daily for 30 days. , Disp: 90 capsule, Rfl: 0    ondansetron (ZOFRAN ODT) 4 MG disintegrating tablet, Take 1 tablet by mouth every 8 hours as needed for Nausea, Disp: 10 tablet, Rfl: 0    Cyanocobalamin (VITAMIN B 12 PO), Take by mouth 2 times daily, Disp: , Rfl:     acetaminophen (TYLENOL) 500 MG tablet, Take 1,000 mg by mouth every 6 hours as needed for Pain., Disp: , Rfl:   Allergies   Allergen Reactions    Latex     Imitrex [Sumatriptan]     Pcn [Penicillins] Other (See Comments)     \"Upset stomach\"    Zoloft [Sertraline Hcl]     Zomig [Zolmitriptan]      Social History     Socioeconomic History    Marital status:      Spouse name: Not on file    Number of children: Not on file    Years of education: Not on file    Highest education level: Not on file   Occupational History    Not on file   Tobacco Use    Smoking status: Never Smoker    Smokeless tobacco: Never Used    Tobacco comment: uses vape   Vaping Use    Vaping Use: Every day    Substances: Never   Substance and Sexual Activity    Alcohol use: No    Drug use: No    Sexual activity: Yes     Partners: Male   Other Topics Concern    Not on file   Social History Narrative    Not on file     Social Determinants of Health     Financial Resource Strain: High Risk    Difficulty of Paying Living Expenses: Very hard   Food Insecurity: Food Insecurity Present    Worried About Running Out of Food in the Last Year: Often true    Say of Food in the Last Year: Often true   Transportation Needs: No Transportation Needs    Lack of Transportation (Medical): No    Lack of Transportation (Non-Medical):  No   Physical Activity:     Days of Exercise per Week: Not on file    Minutes of Exercise per Session: Not on file   Stress:     Feeling of Stress : Not on file   Social Connections:     Frequency of Communication with Friends and Family: Not on file    Frequency of Social Gatherings with Friends and Family: Not on file    Attends Mor[employer] Services: Not on file    Active Member of Clubs or Organizations: Not on file    Attends Club or Organization Meetings: Not on file    Marital Status: Not on file   Intimate Partner Violence:     Fear of Current or Ex-Partner: Not on file    Emotionally Abused: Not on file    Physically Abused: Not on file    Sexually Abused: Not on file   Housing Stability:  Unable to Pay for Housing in the Last Year: Not on file    Number of Places Lived in the Last Year: Not on file    Unstable Housing in the Last Year: Not on file     Past Medical History:   Diagnosis Date    Back pain     herniated disc    Kidney infection     Kidney stone     Migraines     Ovarian cyst      Past Surgical History:   Procedure Laterality Date    DILATION AND CURETTAGE OF UTERUS      ENDOMETRIAL ABLATION      HYSTERECTOMY      TUBAL LIGATION       No family history on file. Physical Exam:  Vitals signs and nursing note reviewed. Constitutional:       Appearance: well-developed. HENT:      Head: Normocephalic and atraumatic. Nose: Nose normal.   Eyes:      Conjunctiva/sclera: Conjunctivae normal.   Neck:      Musculoskeletal: Normal range of motion and neck supple. Pulmonary:      Effort: Pulmonary effort is normal. No respiratory distress. Musculoskeletal:      Comments: Normal gait     Skin:     General: Skin is warm and dry. Neurological:      Mental Status: Alert and oriented to person, place, and time. Sensory: No sensory deficit. Psychiatric:         Behavior: Behavior normal.         Thought Content: Thought content normal.  Examination cervical spine patient with some limited neck rotation otherwise normal i.e. a very minimal Spurling's    Examination shoulder very positive impingement signs    Provider Attestation:  Allen Chaudhari, personally performed the services described in this documentation. All medical record entries made by the scribe were at my direction and in my presence. I have reviewed the chart and discharge instructions and agree that the records reflect my personal performance and is accurate and complete. Krys Wang MD 5/5/22   PA spine    Scribe Attestation:  By signing my name below, Laci Buenrostro, attest that this documentation has been prepared under the direction and in the presence of Dr. Zahraa Beal.  Electronically signed: Matthew Rojas, 5/5/22     Please note that this chart was generated using voice recognition Dragon dictation software. Although every effort was made to ensure the accuracy of this automated transcription, some errors in transcription may have occurred.

## 2022-05-05 NOTE — LETTER
[facility]  [address] [address] [place] [id number]  Phone: [phone]  Fax: [phone]    Antionette Ross MD        May 5, 2022     Patient: Shaun Downing   YOB: 1985   Date of Visit: 5/5/2022       To Whom It May Concern: It is my medical opinion that Chavo Simmons should continue work resrictions of no lifting more than 10 pounds, patient should have limit bending, stooping, and twisting, and no bull pin for 6 weeks    If you have any questions or concerns, please don't hesitate to call.     Sincerely,          Antionette Ross MD

## 2022-05-05 NOTE — TELEPHONE ENCOUNTER
Patient is requesting a referral from   Dr Leann Villafana to be sent to the [address] [place] [address] [facility] Oconnor at earliest convenience. Please advise when fax has been sent. Thank you.     [facility]  Fax - [phone]

## 2022-05-12 ENCOUNTER — PATIENT MESSAGE (OUTPATIENT)
Dept: ORTHOPEDIC SURGERY | Age: 37
End: 2022-05-12

## 2022-05-12 ENCOUNTER — INITIAL CONSULT (OUTPATIENT)
Dept: PHYSICAL MEDICINE AND REHAB | Age: 37
End: 2022-05-12
Payer: MEDICAID

## 2022-05-12 VITALS
TEMPERATURE: 97.7 F | DIASTOLIC BLOOD PRESSURE: 86 MMHG | BODY MASS INDEX: 45.64 KG/M2 | WEIGHT: 257.6 LBS | HEART RATE: 83 BPM | SYSTOLIC BLOOD PRESSURE: 131 MMHG | HEIGHT: 63 IN
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
## 2022-05-12 DIAGNOSIS — M25.512 CHRONIC LEFT SHOULDER PAIN: Primary | ICD-10-CM

## 2022-05-12 DIAGNOSIS — G89.29 CHRONIC NECK PAIN: ICD-10-CM

## 2022-05-12 DIAGNOSIS — M54.2 CHRONIC NECK PAIN: ICD-10-CM

## 2022-05-12 DIAGNOSIS — M54.12 CERVICAL RADICULAR PAIN: ICD-10-CM

## 2022-05-12 DIAGNOSIS — G89.29 CHRONIC LEFT SHOULDER PAIN: Primary | ICD-10-CM

## 2022-05-12 DIAGNOSIS — M54.2 NECK PAIN: ICD-10-CM

## 2022-05-12 DIAGNOSIS — S16.1XXA REPETITIVE STRAIN INJURY OF CERVICAL SPINE, INITIAL ENCOUNTER: Primary | ICD-10-CM

## 2022-05-12 PROCEDURE — 99204 OFFICE O/P NEW MOD 45 MIN: CPT | Performed by: STUDENT IN AN ORGANIZED HEALTH CARE EDUCATION/TRAINING PROGRAM

## 2022-05-12 RX ORDER — NORTRIPTYLINE HYDROCHLORIDE 10 MG/1
CAPSULE ORAL
Qty: 60 CAPSULE | Refills: 0 | Status: SHIPPED | OUTPATIENT
Start: 2022-05-12 | End: 2022-06-23 | Stop reason: DRUGHIGH

## 2022-05-12 RX ORDER — CYCLOBENZAPRINE HCL 10 MG
10 TABLET ORAL 3 TIMES DAILY PRN
COMMUNITY
End: 2022-09-09

## 2022-05-13 ENCOUNTER — HOSPITAL ENCOUNTER (EMERGENCY)
Age: 37
Discharge: HOME | End: 2022-05-13
Attending: EMERGENCY MEDICINE
Payer: COMMERCIAL

## 2022-05-13 ENCOUNTER — TELEPHONE (OUTPATIENT)
Dept: PHYSICAL MEDICINE AND REHAB | Age: 37
End: 2022-05-13

## 2022-05-13 ENCOUNTER — PATIENT MESSAGE (OUTPATIENT)
Dept: PRIMARY CARE CLINIC | Age: 37
End: 2022-05-13

## 2022-05-13 ENCOUNTER — PATIENT MESSAGE (OUTPATIENT)
Dept: ORTHOPEDIC SURGERY | Age: 37
End: 2022-05-13

## 2022-05-13 VITALS
OXYGEN SATURATION: 99 % | SYSTOLIC BLOOD PRESSURE: 143 MMHG | TEMPERATURE: 98.5 F | RESPIRATION RATE: 16 BRPM | DIASTOLIC BLOOD PRESSURE: 94 MMHG | BODY MASS INDEX: 45.54 KG/M2 | WEIGHT: 257 LBS | HEIGHT: 63 IN | HEART RATE: 94 BPM

## 2022-05-13 DIAGNOSIS — M25.512 CHRONIC LEFT SHOULDER PAIN: Primary | ICD-10-CM

## 2022-05-13 DIAGNOSIS — R79.82 ELEVATED C-REACTIVE PROTEIN (CRP): Primary | ICD-10-CM

## 2022-05-13 DIAGNOSIS — G89.29 CHRONIC LEFT SHOULDER PAIN: Primary | ICD-10-CM

## 2022-05-13 DIAGNOSIS — M25.50 ARTHRALGIA, UNSPECIFIED JOINT: ICD-10-CM

## 2022-05-13 PROCEDURE — 99283 EMERGENCY DEPT VISIT LOW MDM: CPT

## 2022-05-13 RX ORDER — LIDOCAINE 50 MG/G
1 PATCH TOPICAL DAILY
Qty: 10 PATCH | Refills: 0 | Status: SHIPPED | OUTPATIENT
Start: 2022-05-13 | End: 2022-05-23

## 2022-05-13 RX ORDER — METHYLPREDNISOLONE 4 MG/1
4 TABLET ORAL DAILY
COMMUNITY
Start: 2022-05-09 | End: 2022-05-31

## 2022-05-13 ASSESSMENT — PAIN DESCRIPTION - DESCRIPTORS: DESCRIPTORS: THROBBING;ACHING

## 2022-05-13 ASSESSMENT — PAIN SCALES - GENERAL: PAINLEVEL_OUTOF10: 10

## 2022-05-13 ASSESSMENT — PAIN DESCRIPTION - ONSET: ONSET: ON-GOING

## 2022-05-13 ASSESSMENT — PAIN DESCRIPTION - PAIN TYPE: TYPE: CHRONIC PAIN

## 2022-05-13 ASSESSMENT — PAIN DESCRIPTION - LOCATION: LOCATION: SHOULDER

## 2022-05-13 ASSESSMENT — PAIN DESCRIPTION - ORIENTATION: ORIENTATION: LEFT

## 2022-05-13 ASSESSMENT — PAIN - FUNCTIONAL ASSESSMENT
PAIN_FUNCTIONAL_ASSESSMENT: PREVENTS OR INTERFERES SOME ACTIVE ACTIVITIES AND ADLS
PAIN_FUNCTIONAL_ASSESSMENT: 0-10

## 2022-05-13 ASSESSMENT — PAIN DESCRIPTION - FREQUENCY: FREQUENCY: CONTINUOUS

## 2022-05-13 NOTE — TELEPHONE ENCOUNTER
From: Jonathon Farrar  To: Dr. Anayeli Burgess: 5/13/2022 9:01 AM EDT  Subject: Shoulder    I saw the physiatrist yesterday and by 6 last night I noticed swelling along the bottom of my neck to my collar bone. It's not reduced in size since last night and it's increasing the pain I'm having. Should I try to get in with my PCP or go to the hospital since I can't move my arm and the swelling isn't going away?

## 2022-05-13 NOTE — TELEPHONE ENCOUNTER
Pt sent message thru [employer]in[employer]:   By 6 last night I noticed swelling along the bottom of my neck to my collar bone. It's not reduced in size since last night and it's increasing the pain I'm having. Should I try to get in with my PCP or go to the hospital since I can't move my arm and the swelling isn't going away? I advised her to go the ER or urgent care to be evaluated.

## 2022-05-13 NOTE — ED PROVIDER NOTES
[id number] [facility]  [address] [facility]. [facility] [address]  Phone: [phone]  Fax: [phone]        Pt Name: Delio Ratliff  MRN: [id number]  [place] 1985  Date of evaluation: 5/13/22      CHIEF COMPLAINT     Chief Complaint   Patient presents with    Shoulder Pain     injured shoulder last august. is scheduled with pain management but cannot tolerate the pain. HISTORY OF PRESENT ILLNESS  (Location/Symptom, Timing/Onset, Context/Setting, Quality, Duration, Modifying Factors, Severity.)    Delio Ratliff is a 39 y.o. female who presents with shoulder pain. Patient had a injury of her shoulder in August, and has plans to follow-up with pain management. She states that she is on a muscle relaxer, and has been taking it. No new injury. Patient reports that she is noticed a swollen area on her anterior chest wall near her clavicle. No fever or chills. REVIEW OF SYSTEMS    (2-9 systems for level 4, 10 or more for level 5)     Constitutional: no fever, chills, fatigue  HENT: No headache, nasal congestion, sore throat, hearing changes, ear pain or discharge  Eyes: no visual changes or photophobia  Respiratory: no cough, shortness of breath, or wheezing  Cardiovascular: no chest pain, palpitations, or leg swelling  Abdominal: no abdominal pain, nausea, vomiting, diarrhea, or constipation  Genitourinary: no dysuria, frequency, or urgency  Musculoskeletal: no arthralgias, myalgias, neck or back pain  Skin: no rash or wound  Neurological: no numbness, tingling or weakness  Hematologic:  no history of easy bleeding or bruising            PAST MEDICAL HISTORY    has a past medical history of Back pain, Kidney infection, Kidney stone, Migraines, and Ovarian cyst.    SURGICAL HISTORY      has a past surgical history that includes Tubal ligation; Dilation and curettage of uterus; Endometrial ablation; and Hysterectomy.     [address]       Discharge Medication List as of 5/13/2022  5:34 PM      CONTINUE these medications which have NOT CHANGED    Details   methylPREDNISolone (MEDROL DOSEPACK) 4 MG tablet Take 4 mg by mouth dailyHistorical Med      cyclobenzaprine (FLEXERIL) 10 MG tablet Take 10 mg by mouth 3 times daily as needed for Muscle spasmsHistorical Med      nortriptyline (PAMELOR) 10 MG capsule Take 10mg nightly for 1 week, followed by 20mg nightly ongoing., Disp-60 capsule, R-0Normal      buPROPion (WELLBUTRIN XL) 150 MG extended release tablet Take 1 tablet by mouth every morning, Disp-30 tablet, R-0Normal      meloxicam (MOBIC) 15 MG tablet Take 1 tablet by mouth daily as needed for Pain, Disp-30 tablet, R-0Normal      hydrOXYzine (ATARAX) 25 MG tablet Take 1 tablet by mouth nightly, Disp-30 tablet, R-0Normal      gabapentin (NEURONTIN) 300 MG capsule Take 1 capsule by mouth three times daily for 30 days. , Disp-90 capsule, R-0Normal      ondansetron (ZOFRAN ODT) 4 MG disintegrating tablet Take 1 tablet by mouth every 8 hours as needed for Nausea, Disp-10 tablet, R-0Print      Cyanocobalamin (VITAMIN B 12 PO) Take by mouth 2 times daily      acetaminophen (TYLENOL) 500 MG tablet Take 1,000 mg by mouth every 6 hours as needed for Pain. ALLERGIES     is allergic to latex, imitrex [sumatriptan], zoloft [sertraline hcl], zomig [zolmitriptan], and pcn [penicillins]. FAMILY HISTORY     has no family status information on file. family history is not on file. SOCIAL HISTORY      reports that she has never smoked. She has never used smokeless tobacco. She reports that she does not drink alcohol and does not use drugs. PHYSICAL EXAM    (up to 7 for level 4, 8 or more for level 5)   INITIAL VITALS:  height is 5' 3\" (1.6 m) and weight is 116.6 kg (257 lb). Her oral temperature is 98.5 °F (36.9 °C). Her blood pressure is 143/94 (abnormal) and her pulse is 94. Her respiration is 16 and oxygen saturation is 99%.       Physical Exam  Vitals and nursing note reviewed. Constitutional:       Appearance: Normal appearance. Eyes:      Extraocular Movements: Extraocular movements intact. Pupils: Pupils are equal, round, and reactive to light. Musculoskeletal:         General: Tenderness present. No swelling, deformity or signs of injury. Right lower leg: No edema. Comments: Tenderness to palpation over the left shoulder. No erythema or warmth to the joint. Range of motion is limited secondary to pain. Radial pulses palpable. I do not appreciate any mass lesion or lumps or bumps on the anterior chest wall near the clavicle, or the patient reports increased pain and swelling. Skin:     General: Skin is warm and dry. Capillary Refill: Capillary refill takes less than 2 seconds. Neurological:      Mental Status: She is alert and oriented to person, place, and time. Mental status is at baseline. Psychiatric:         Mood and Affect: Mood normal.         Behavior: Behavior normal.         Thought Content: Thought content normal.         DIFFERENTIAL DIAGNOSIS/ MDM:     28-year-old female here with left shoulder pain. Known tendinosis of the rotator cuff. Has follow-up planned with pain management. No new injury. The quality of the pain is the same. Plan for pain control. Continue with follow-up with pain management as planned. DIAGNOSTIC RESULTS     EKG: All EKG's are interpreted by the Emergency Department Physician who either signs or Co-signs this chart in the absence of a cardiologist.    none    RADIOLOGY:        Interpretation per the Radiologist below, if available at the time of this note:    none    LABS:  No results found for this visit on 05/13/22.     none    EMERGENCY DEPARTMENT COURSE:   Vitals:    Vitals:    05/13/22 1640   BP: (!) 143/94   Pulse: 94   Resp: 16   Temp: 98.5 °F (36.9 °C)   TempSrc: Oral   SpO2: 99%   Weight: 116.6 kg (257 lb)   Height: 5' 3\" (1.6 m)     -------------------------  BP: (!) 143/94, Temp: 98.5 °F (36.9 °C), Pulse: 94, Resp: 16        CONSULTS:  none    PROCEDURES:  None    FINAL IMPRESSION      1. Chronic left shoulder pain          DISPOSITION/PLAN   DISPOSITION Decision To Discharge 05/13/2022 05:33:52 PM      CONDITION ON DISPOSITION:   Stable     PATIENT REFERRED TO:  May Coronado, [address] [place] [address]  Dr. Edwin Eli [address] [place] [id number]  [phone]    Schedule an appointment as soon as possible for a visit in 3 days        DISCHARGE MEDICATIONS:  Discharge Medication List as of 5/13/2022  5:34 PM      START taking these medications    Details   lidocaine (LIDODERM) 5 % Place 1 patch onto the skin daily for 10 days 12 hours on, 12 hours off., Disp-10 patch, R-0Normal             (Please note that portions of this note were completed with a voicerecognition program.  Efforts were made to edit the dictations but occasionally words are mis-transcribed.)    Uzair Chavez MD  Attending Emergency Medicine Physician       Uzair Chavez MD  05/13/22 0822

## 2022-05-13 NOTE — TELEPHONE ENCOUNTER
From: Kari Sensor  To: May Failing  Sent: 5/13/2022 9:00 AM EDT  Subject: Shoulder    I saw the physiatrist on yesterday and by 6 last night I noticed swelling along the bottom of my neck to my collar bone. It's not reduced in size since last night and it's increasing the pain I'm having. Should I go to the hospital since I can't move my arm and the swelling isn't going away?

## 2022-05-16 NOTE — TELEPHONE ENCOUNTER
Referral placed to [facility] rheumatology. Please assist with referral information and faxing. Thank you.

## 2022-05-18 ENCOUNTER — TELEMEDICINE (OUTPATIENT)
Dept: BEHAVIORAL/MENTAL HEALTH CLINIC | Age: 37
End: 2022-05-18
Payer: COMMERCIAL

## 2022-05-18 DIAGNOSIS — F43.10 PTSD (POST-TRAUMATIC STRESS DISORDER): Primary | ICD-10-CM

## 2022-05-18 PROCEDURE — 90791 PSYCH DIAGNOSTIC EVALUATION: CPT | Performed by: SOCIAL WORKER
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
## 2022-05-20 ENCOUNTER — TELEPHONE (OUTPATIENT)
Dept: BARIATRICS/WEIGHT MGMT | Age: 37
End: 2022-05-20

## 2022-05-20 NOTE — TELEPHONE ENCOUNTER
Online Info Session Completed:  on 5/19/22     Verified Insurance Benefit   with [employer]    Patient informed the following: This is an EXCLUDED benefit on patients plan.

## 2022-05-23 ENCOUNTER — PATIENT MESSAGE (OUTPATIENT)
Dept: PRIMARY CARE CLINIC | Age: 37
End: 2022-05-23

## 2022-05-23 DIAGNOSIS — M25.50 ARTHRALGIA, UNSPECIFIED JOINT: Primary | ICD-10-CM

## 2022-05-23 DIAGNOSIS — S16.1XXS REPETITIVE STRAIN INJURY OF CERVICAL SPINE, SEQUELA: ICD-10-CM
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
## 2022-05-23 NOTE — PROGRESS NOTES
[facility]  104 [facility] Nicolette Cortesophilakis 200 [facility]  [address] [address]  Dept: [phone]  Dept Fax: [phone]    New Patient Note    Amber Hernandez is a 39y.o.-year-old female presenting with neck and left shoulder pain. HPI:     She reports neck and left shoulder pain, which started in 8/2021 after she hit her elbow on a steel rack at work. She states that she remained at work after this injury and the pain worsened. She also reports that she was unable to move the left upper limb the following day but continued working for another week before she was given work restrictions. She feels like the pain has gotten worse over time. She localizes the pain to the base of the neck posteriorly, over the left trapezius muscle, and to the left shoulder with radiation to the 4th and 5th digits of the left hand. She describes the pain as stabbing and aching. She also notes tingling, weakness, and intermittent edema in the left upper limb. She has tried heat for pain, which helps temporarily. She has also tried tylenol and ibuprofen, which didn't help. She notes that biofreeze, aspercreme, and topical CBD helped temporarily. She went to physical therapy in 9/2021 and 12/2021 but felt like the therapy worsened her pain. She states that she has taken gabapentin in the past but was taken off of it due to concern regarding weight gain. She feels like cymbalta and wellbutrin caused insomnia. She has recently been started on meloxicam, which she feels helps a little bit with swelling, and flexeril, which helps a little bit. She states that she also had an injection of the left shoulder, which only helped a little bit. She states that she had an appointment with a spine surgeon on Monday, who recommended possible injections. She has an appointment with pain management in June.     She reports treatment with an ablation in the neck many years ago after a lifting injury resulting in pain in the neck, left shoulder, and left upper limb. She also notes difficulty sleeping due to her current pain. She states that she has had some trouble sleeping in the past as well. She has tried melatonin and unisom, which sometimes help. She has recently been started on hydroxyzine for sleep, which she feels is not helping much. She denies taking any other medications for sleep in the past.  She notes depression related to her pain, which is being managed by her PCP. Past Medical History:   Diagnosis Date    Back pain     herniated disc    Kidney infection     Kidney stone     Migraines     Ovarian cyst       Past Surgical History:   Procedure Laterality Date    DILATION AND CURETTAGE OF UTERUS      ENDOMETRIAL ABLATION      HYSTERECTOMY      TUBAL LIGATION       No family history on file. Social History     Socioeconomic History    Marital status:      Spouse name: Not on file    Number of children: Not on file    Years of education: Not on file    Highest education level: Not on file   Occupational History    Not on file   Tobacco Use    Smoking status: Never Smoker    Smokeless tobacco: Never Used    Tobacco comment: uses vape   Vaping Use    Vaping Use: Every day    Substances: Never, Flavoring   Substance and Sexual Activity    Alcohol use: No    Drug use: No    Sexual activity: Yes     Partners: Male   Other Topics Concern    Not on file   Social History Narrative    Not on file     Social Determinants of Health     Financial Resource Strain: High Risk    Difficulty of Paying Living Expenses: Very hard   Food Insecurity: Food Insecurity Present    Worried About Running Out of Food in the Last Year: Often true    Say of Food in the Last Year: Often true   Transportation Needs: No Transportation Needs    Lack of Transportation (Medical):  No    Lack of Transportation (Non-Medical): No   Physical Activity:     Days of Exercise per Week: Not on file    Minutes of Exercise per Session: Not on file   Stress:     Feeling of Stress : Not on file   Social Connections:     Frequency of Communication with Friends and Family: Not on file    Frequency of Social Gatherings with Friends and Family: Not on file    Attends [employer] Services: Not on file    Active Member of [address] [place] [address] or Organizations: Not on file    Attends Club or Organization Meetings: Not on file    Marital Status: Not on file   Intimate Partner Violence:     Fear of Current or Ex-Partner: Not on file    Emotionally Abused: Not on file    Physically Abused: Not on file    Sexually Abused: Not on file   Housing Stability:     Unable to Pay for Housing in the Last Year: Not on file    Number of [place] in the Last Year: Not on file    Unstable Housing in the Last Year: Not on file       Allergies   Allergen Reactions    Latex     Imitrex [Sumatriptan] Anaphylaxis    Zoloft [Sertraline Hcl] Anaphylaxis    Zomig [Zolmitriptan] Anaphylaxis    Pcn [Penicillins] Other (See Comments)     \"Upset stomach\"       Current Outpatient Medications   Medication Sig Dispense Refill    cyclobenzaprine (FLEXERIL) 10 MG tablet Take 10 mg by mouth 3 times daily as needed for Muscle spasms      nortriptyline (PAMELOR) 10 MG capsule Take 10mg nightly for 1 week, followed by 20mg nightly ongoing. 60 capsule 0    buPROPion (WELLBUTRIN XL) 150 MG extended release tablet Take 1 tablet by mouth every morning 30 tablet 0    meloxicam (MOBIC) 15 MG tablet Take 1 tablet by mouth daily as needed for Pain 30 tablet 0    ondansetron (ZOFRAN ODT) 4 MG disintegrating tablet Take 1 tablet by mouth every 8 hours as needed for Nausea 10 tablet 0    Cyanocobalamin (VITAMIN B 12 PO) Take by mouth 2 times daily      acetaminophen (TYLENOL) 500 MG tablet Take 1,000 mg by mouth every 6 hours as needed for Pain.       methylPREDNISolone (MEDROL DOSEPACK) 4 MG tablet Take 4 mg by mouth daily      gabapentin (NEURONTIN) 300 MG capsule Take 1 capsule by mouth three times daily for 30 days. (Patient not taking: Reported on 5/12/2022) 90 capsule 0     No current facility-administered medications for this visit. Subjective:      Review of Systems   Constitutional: Positive for activity change. Musculoskeletal: Positive for arthralgias and neck pain. Neurological: Positive for weakness and numbness. Psychiatric/Behavioral: Positive for sleep disturbance. Objective:     Physical Exam  /86   Pulse 83   Temp 97.7 °F (36.5 °C)   Ht 5' 3\" (1.6 m)   Wt 257 lb 9.6 oz (116.8 kg)   LMP 09/16/2017   BMI 45.63 kg/m²     Constitutional: She appears well-developed and well-nourished. In no distress. HEENT: NCAT, EOM grossly intact. Hearing grossly intact. Pulmonary/Chest: Respirations WNL and unlabored. MSK: Tenderness to palpation of the cervical spine and left paraspinal musculature. Decreased cervical spine ROM. Diffuse tenderness to palpation of the left shoulder. Decreased active and passive ROM of the left shoulder. Strength 5/5 in right upper limb. Strength 4/5 with left shoulder abduction, elbow flexion, elbow extension, and finger flexion (pain present). Strength 4+/5 with left wrist extension. Neurological: She is alert and oriented to person, place, and time. Sensation to light touch decreased at the proximal and lateral left upper limb, left medial forearm, and 1st and 5th digits of the left hand compared to the right. Sensation to light touch intact in the left 3rd digit. Skin: Skin is warm and dry with good turgor. Psychiatric: She has a normal mood and affect. Her behavior is normal. Thought content normal.    Nursing note and vitals reviewed. Reviewed notes from orthopedic surgery, PCP. Imaging  MRI cervical spine, 2/17/22:  Impression   1.  Minimal disc bulge indenting the anterior thecal sac at C5-C6.  No central spinal canal or foraminal narrowing. 2. Abnormal low T1 signal in the marrow which is likely related to red marrow   versus anemia.  Correlate with the patient's CBC. MRI left shoulder, 4/28/22:  Impression   1. Mild supraspinatus and infraspinatus tendinosis.  No partial or   full-thickness rotator cuff tear. 2. No discrete labral tear or paralabral cyst formation. 3. No acute osseous abnormality. Assessment:       Diagnosis Orders   1. Chronic left shoulder pain     2. Chronic neck pain       - Physical examination is difficult due to patient's pain. Therefore, it is difficult to discern whether the neck or shoulder is her primary pain generator. Pain could also potentially be related to both the neck and shoulder. Plan:      - At this point, the patient has tried multiple conservative measure for management of pain  - Provided prescription for nortriptyline, as it may help with both pain and sleep and patient has not tried it before. - She already has an appointment scheduled with pain management in June  - Could consider an ultrasound-guided injection of the left shoulder in the future  - Follow up with orthopedic surgery as directed      Orders Placed This Encounter   Medications    nortriptyline (PAMELOR) 10 MG capsule     Sig: Take 10mg nightly for 1 week, followed by 20mg nightly ongoing. Dispense:  60 capsule     Refill:  0       Return in about 1 month (around 6/12/2022).        Electronically signed by Alie Howe MD on 5/24/2022 at 12:15 AM

## 2022-05-24 NOTE — TELEPHONE ENCOUNTER
I have placed referral to Retain program. Also, I have recommend completing FCE. I typically will have the FCE completed prior to long-term off work unless there is surgical treatment.

## 2022-05-31 ENCOUNTER — TELEMEDICINE (OUTPATIENT)
Dept: PRIMARY CARE CLINIC | Age: 37
End: 2022-05-31
Payer: MEDICAID

## 2022-05-31 DIAGNOSIS — F43.10 PTSD (POST-TRAUMATIC STRESS DISORDER): ICD-10-CM

## 2022-05-31 DIAGNOSIS — E66.01 OBESITY, MORBID, BMI 40.0-49.9 (HCC): ICD-10-CM

## 2022-05-31 DIAGNOSIS — F33.1 MODERATE EPISODE OF RECURRENT MAJOR DEPRESSIVE DISORDER (HCC): Primary | ICD-10-CM

## 2022-05-31 PROCEDURE — 99213 OFFICE O/P EST LOW 20 MIN: CPT | Performed by: PHYSICIAN ASSISTANT

## 2022-05-31 RX ORDER — LIDOCAINE 50 MG/G
1 PATCH TOPICAL DAILY
COMMUNITY
End: 2022-09-09

## 2022-05-31 RX ORDER — TIZANIDINE 2 MG/1
TABLET ORAL
COMMUNITY
End: 2022-09-09

## 2022-05-31 RX ORDER — LIRAGLUTIDE 6 MG/ML
INJECTION, SOLUTION SUBCUTANEOUS
Qty: 1 PEN | Refills: 0 | Status: SHIPPED | OUTPATIENT
Start: 2022-05-31 | End: 2022-08-11

## 2022-05-31 ASSESSMENT — ENCOUNTER SYMPTOMS
RHINORRHEA: 0
BACK PAIN: 0
COUGH: 0
ABDOMINAL PAIN: 0
VOMITING: 0
NAUSEA: 0
SHORTNESS OF BREATH: 0
CONSTIPATION: 0
SINUS PAIN: 0
DIARRHEA: 0

## 2022-05-31 ASSESSMENT — PATIENT HEALTH QUESTIONNAIRE - PHQ9
7. TROUBLE CONCENTRATING ON THINGS, SUCH AS READING THE NEWSPAPER OR WATCHING TELEVISION: 1
SUM OF ALL RESPONSES TO PHQ QUESTIONS 1-9: 17
SUM OF ALL RESPONSES TO PHQ9 QUESTIONS 1 & 2: 4
10. IF YOU CHECKED OFF ANY PROBLEMS, HOW DIFFICULT HAVE THESE PROBLEMS MADE IT FOR YOU TO DO YOUR WORK, TAKE CARE OF THINGS AT HOME, OR GET ALONG WITH OTHER PEOPLE: 2
3. TROUBLE FALLING OR STAYING ASLEEP: 3
8. MOVING OR SPEAKING SO SLOWLY THAT OTHER PEOPLE COULD HAVE NOTICED. OR THE OPPOSITE, BEING SO FIGETY OR RESTLESS THAT YOU HAVE BEEN MOVING AROUND A LOT MORE THAN USUAL: 2
1. LITTLE INTEREST OR PLEASURE IN DOING THINGS: 2
2. FEELING DOWN, DEPRESSED OR HOPELESS: 2
SUM OF ALL RESPONSES TO PHQ QUESTIONS 1-9: 15
SUM OF ALL RESPONSES TO PHQ QUESTIONS 1-9: 17
5. POOR APPETITE OR OVEREATING: 1
4. FEELING TIRED OR HAVING LITTLE ENERGY: 1
SUM OF ALL RESPONSES TO PHQ QUESTIONS 1-9: 17
9. THOUGHTS THAT YOU WOULD BE BETTER OFF DEAD, OR OF HURTING YOURSELF: 2
6. FEELING BAD ABOUT YOURSELF - OR THAT YOU ARE A FAILURE OR HAVE LET YOURSELF OR YOUR FAMILY DOWN: 3

## 2022-05-31 NOTE — PROGRESS NOTES
2022    TELEHEALTH EVALUATION -- Audio/Visual (During HWBUX-79 public health emergency)    HPI:    Florencio Wilson (:  1985) has requested an audio/video evaluation for the following concern(s):    Patient presents as a virtual visit to discuss depression and medications. Patient has been taking Wellbutrin since last office visit. She reports this medicine has not drastically helped her depression. She still notes mood changes, periods of down/hopelessness, crying over small changes. She feels this medicine has not helped her quality of life. She still has difficulty with self image. Patient is also very concerned about her weight. She has tried making some adjustments to her calorie intake and quality of food with minimal benefit. She feels that her weight is causing self-confidence issues which is not helping depression. Patient continues to follow with pain management, PMNR for chronic management of left shoulder pain. Review of Systems   Constitutional: Negative for chills, fatigue and fever. HENT: Negative for congestion, rhinorrhea and sinus pain. Respiratory: Negative for cough and shortness of breath. Cardiovascular: Negative for chest pain and leg swelling. Gastrointestinal: Negative for abdominal pain, constipation, diarrhea, nausea and vomiting. Genitourinary: Negative for difficulty urinating, frequency and urgency. Musculoskeletal: Negative for arthralgias, back pain and myalgias. Neurological: Negative for dizziness and headaches. Psychiatric/Behavioral: Positive for dysphoric mood and sleep disturbance. Negative for confusion. The patient is nervous/anxious. All other systems reviewed and are negative. Prior to Visit Medications    Medication Sig Taking?  Authorizing Provider   tiZANidine (ZANAFLEX) 2 MG tablet tizanidine 2 mg tablet   TAKE 1 TABLET BY MOUTH THREE TIMES DAILY AS NEEDED FOR MUSCLE SPASM / NECK PAIN Yes Historical Provider, MD lidocaine (LIDODERM) 5 % Place 1 patch onto the skin daily 12 hours on, 12 hours off. Yes Historical Provider, MD   liraglutide-weight management (SAXENDA) 18 MG/3ML SOPN Inject subcutaneously once daily; 0.6 mg 1st week, 1.2 mg week 2, 1.8 mg week 3, 2.4 mg week 4, 3.0 mg thereafter Yes Haroon Banks PA-C   cyclobenzaprine (FLEXERIL) 10 MG tablet Take 10 mg by mouth 3 times daily as needed for Muscle spasms Yes Historical Provider, MD   nortriptyline (PAMELOR) 10 MG capsule Take 10mg nightly for 1 week, followed by 20mg nightly ongoing. Yes Gianfranco Plata MD   meloxicam (MOBIC) 15 MG tablet Take 1 tablet by mouth daily as needed for Pain Yes Haroon Banks PA-C   ondansetron (ZOFRAN ODT) 4 MG disintegrating tablet Take 1 tablet by mouth every 8 hours as needed for Nausea Yes Blanca Haines MD   Cyanocobalamin (VITAMIN B 12 PO) Take by mouth 2 times daily Yes Historical Provider, MD   acetaminophen (TYLENOL) 500 MG tablet Take 1,000 mg by mouth every 6 hours as needed for Pain.  Yes Historical Provider, MD       Social History     Tobacco Use    Smoking status: Never Smoker    Smokeless tobacco: Never Used    Tobacco comment: uses vape   Vaping Use    Vaping Use: Every day    Substances: Never, Flavoring   Substance Use Topics    Alcohol use: No    Drug use: No        Allergies   Allergen Reactions    Latex     Imitrex [Sumatriptan] Anaphylaxis    Zoloft [Sertraline Hcl] Anaphylaxis    Zomig [Zolmitriptan] Anaphylaxis    Pcn [Penicillins] Other (See Comments)     \"Upset stomach\"   ,   Past Medical History:   Diagnosis Date    Back pain     herniated disc    Kidney infection     Kidney stone     Migraines     Ovarian cyst    ,   Past Surgical History:   Procedure Laterality Date    DILATION AND CURETTAGE OF UTERUS      ENDOMETRIAL ABLATION      HYSTERECTOMY      TUBAL LIGATION     ,   Social History     Tobacco Use    Smoking status: Never Smoker    Smokeless tobacco: Never Used   Suzanne Stabs Tobacco comment: uses vape   Vaping Use    Vaping Use: Every day    Substances: Never, Flavoring   Substance Use Topics    Alcohol use: No    Drug use: No   , History reviewed. No pertinent family history. ,   There is no immunization history on file for this patient. PHYSICAL EXAMINATION:  [ INSTRUCTIONS:  \"[x]\" Indicates a positive item  \"[]\" Indicates a negative item  -- DELETE ALL ITEMS NOT EXAMINED]  Vital Signs: (As obtained by patient/caregiver or practitioner observation)      Constitutional: [x] Appears well-developed and well-nourished [x] No apparent distress      [] Abnormal-   Mental status  [x] Alert and awake  [] Oriented to person/place/time [x]Able to follow commands      Eyes:  EOM    [x]  Normal  [] Abnormal-  Sclera  [x]  Normal  [] Abnormal -         Discharge [x]  None visible  [] Abnormal -    HENT:   [x] Normocephalic, atraumatic. [] Abnormal   [x] Mouth/Throat: Mucous membranes are moist.     External Ears [x] Normal  [] Abnormal-     Neck: [x] No visualized mass     Pulmonary/Chest: [x] Respiratory effort normal.  [x] No visualized signs of difficulty breathing or respiratory distress        [] Abnormal-      Musculoskeletal:   [x] Normal range of motion of neck        [] Abnormal-       Neurological:        [x] No Facial Asymmetry (Cranial nerve 7 motor function) (limited exam to video visit)          [x] No gaze palsy        [] Abnormal-         Skin:        [x] No significant exanthematous lesions or discoloration noted on facial skin         [] Abnormal-            Psychiatric:       [x] Normal Affect [x] No Hallucinations        [] Abnormal-     Other pertinent observable physical exam findings-     ASSESSMENT/PLAN:  1. Moderate episode of recurrent major depressive disorder (HCC)  - Ever Fried MD, Psychiatry, [place]  2.  PTSD (post-traumatic stress disorder)  - Ever Fried MD, Psychiatry, [facility]  Patient has failed Cymbalta and Wellbutrin for management of depression and PTSD. She is agreeable to referral to psychiatrist for further evaluation and management. I advised her to call the office with any concerns in the interim. 3. Obesity, morbid, BMI 40.0-49.9 ([facility])  - liraglutide-weight management (SAXENDA) 18 MG/3ML SOPN; Inject subcutaneously once daily; 0.6 mg 1st week, 1.2 mg week 2, 1.8 mg week 3, 2.4 mg week 4, 3.0 mg thereafter  Dispense: 1 pen; Refill: 0  We had a long discussion about obesity and weight loss management. She is agreeable to starting Saxenda. Discussed instructions and side effects. I also discussed optimum dietary habits to promote weight loss. I recommended monitoring portion sizes, excess carbohydrates, nighttime meals. Return in about 4 weeks (around 6/28/2022) for after starting saxenda. Anselmo Russell, was evaluated through a synchronous (real-time) audio-video encounter. The patient (or guardian if applicable) is aware that this is a billable service, which includes applicable co-pays. This Virtual Visit was conducted with patient's (and/or legal guardian's) consent. The visit was conducted pursuant to the emergency declaration under the [address] [facility], [address] [place] [address] authority and the [employer] General Act. Patient identification was verified, and a caregiver was present when appropriate. The patient was located at Home: On license of [facility] [address] [place] [address] [address]  [address] Provider was located at [facility] ([address] [place] [address]): [address] [place] [address]  [address] [address]  [facility],  [facility] Total time spent on this encounter: Not billed by time    --Aura Phan PA-C on 5/31/2022 at 10:07 AM    An electronic signature was used to authenticate this note.

## 2022-06-01 ENCOUNTER — TELEPHONE (OUTPATIENT)
Dept: PRIMARY CARE CLINIC | Age: 37
End: 2022-06-01

## 2022-06-01 NOTE — TELEPHONE ENCOUNTER
Call made to the patient to schedule a 4-6 week follow up appointment for a medication check on the [address] [place] [address]. Also called to give the patient the contact information for the referral that was placed to Dr. Kike Kenny. Writer FRANKIE for the patient to contact the office.

## 2022-06-06 ENCOUNTER — TELEPHONE (OUTPATIENT)
Dept: PRIMARY CARE CLINIC | Age: 37
End: 2022-06-06

## 2022-06-07 ENCOUNTER — TELEMEDICINE (OUTPATIENT)
Dept: BEHAVIORAL/MENTAL HEALTH CLINIC | Age: 37
End: 2022-06-07
Payer: MEDICAID

## 2022-06-07 ENCOUNTER — CLINICAL DOCUMENTATION (OUTPATIENT)
Dept: BEHAVIORAL/MENTAL HEALTH CLINIC | Age: 37
End: 2022-06-07

## 2022-06-07 DIAGNOSIS — F43.10 PTSD (POST-TRAUMATIC STRESS DISORDER): Primary | ICD-10-CM

## 2022-06-07 PROCEDURE — 90834 PSYTX W PT 45 MINUTES: CPT | Performed by: SOCIAL WORKER

## 2022-06-07 NOTE — PROGRESS NOTES
ADULT BEHAVIORAL HEALTH FOLLOW UP  TORI Prater  Licensed Independent          Visit Date: 6/7/2022   Time of appointment: 11:09 AM   Time spent with Patient: 44 minutes. This is patient's second appointment. Patient Location: [facility]/Clinician Location: [address] [place] [address]   This was a tele-health visit conducted via interactive/real time audio and video. Reason for Consult:  Depression and Anxiety     PCP:  Cassy Schwartz PA-C      Pt provided informed consent for the behavioral health program. Discussed with patient model of service to include the limits of confidentiality (i.e. abuse reporting, suicide intervention, etc.) and short-term intervention focused approach. Pt indicated understanding. Pt provided verbal consent to engage in tele-health psychotherapy. This visit was completed using My Chart due to patient preference at time of scheduling. This visit was held in a private space in patient's home. Riley Alvarenga is a 39 y.o. female who presents for follow up of depression. Vlaentine Macias reported her depression has been really rough lately. She has been worried about her niece who was recently hospitalized as a result of behavior she did when upset with her sister. She expressed feeling bad that she had a breakdown yesterday, and feels that she struggles with this because she tries to be strong for her  and everyone around her. Valentine Macias is still waiting on approval from insurance to move forward with the next steps for her treatment and work, she expressed this has been very frustrating. She shared she has scheduled a new appointment with pain management to address how she has been feeling. She reported she has been feeling triggered by constantly being in pain. Valentine Macias discussed how this leads to negative thoughts of feeling like a failure and self-blame for her family being in this situation following her injury.  She reported she has been off Wellbutrin for over a week because she was having difficulty sleeping while taking this. She has begun calling psychiatry offices to establish care and has an appointment at [facility] on 7/25. She has been trying to utilize positive distractions to cope; music, reading a book, talking with her , going for daily walks, listening to songs that her aunt enjoyed, changing eating habits, using apps to track food intake, and trying not to resort to eating unhealthy when feeling stressed. Previous Recommendations: Enzo Bence was recommended to begin psychotherapy to address depression and anxiety. MENTAL STATUS EXAM  Mood was depressed with calm affect. Suicidal ideation was not reported. Homicidal ideation was not reported. Hygiene was good . Dress was appropriate. Behavior was Within Normal Limits with some self-report of difficulties ambulating. Attitude was Engageable. Eye-contact was good. Speech: rate - WNL, rhythm - WNL, volume - WNL. Verbalizations were goal directed. Thought processes were intact and goal-oriented without evidence of delusions, hallucinations, obsessions, or laney; with little cognitive distortions. Associations were characterized by intact cognitive processes. Pt was oriented to person, place, time, and general circumstances;  recent:  good. Insight and judgment were estimated to be good, AEB, a fair  understanding of cyclical maladaptive patterns, and the ability to use insight to inform behavior change. ASSESSMENT  Ahmet Miner presented to the appointment today for evaluation and treatment of symptoms of depression and anxiety. She is currently deemed no risk to herself or others and meets criteria for PTSD. Enzo Bence continues to experience increased depression and anxiety, r/t previous trauma and situational stressor (not being able to work due to injury). Enzo Bence expressed desire to establish ongoing counseling and psychiatry services.  We discussed benefits of meeting with a trauma specialist and Valentine Macias was in agreement with meeting with [facility] until she can establish with ongoing therapy. Valentine Macias has an appointment with Nitta Yuma on 7/25. [facility] will send Thaina possible trauma therapy referrals via My Chart. Valentine Macias was in agreement with recommendations. PHQ Scores 5/31/2022 4/28/2022 4/15/2022   PHQ2 Score 4 2 6   PHQ9 Score 17 17 21     Interpretation of Total Score Depression Severity: 1-4 = Minimal depression, 5-9 = Mild depression, 10-14 = Moderate depression, 15-19 = Moderately severe depression, 20-27 = Severe depression    How often pt has had thoughts of death or hurting self (if PHQ positive for depression):       No flowsheet data found. Interpretation of MELA-7 score: 5-9 = mild anxiety, 10-14 = moderate anxiety, 15+ = severe anxiety. Recommend referral to behavioral health for scores 10 or greater. DIAGNOSIS  Valentine Macias was seen today for depression and anxiety. Diagnoses and all orders for this visit:    PTSD (post-traumatic stress disorder)        INTERVENTION  Discussed various factors related to the development and maintenance of  depression and anxiety (including biological, cognitive, behavioral, and environmental factors), Trained in strategies for increasing balanced thinking, Discussed potential treatments for  depression and anxiety, Collaborative treatment planning,Clarified role of [facility] in primary care,Recommended that pt establish with a mental health clinician with whom they can meet regularly for psychotherapy services and Reviewed options for identifying appropriate providers. Keyonna Wen was scheduled for follow-up appointment to check-in regarding management of depressive symptoms. Encouraged Valentine Macias to establish care with an ongoing counselor for regularly scheduled appointments. Valentine Macias has scheduled an intake at [facility] July 25th. [facility] will continue to provide care until patient establishes with ongoing provider.        INTERACTIVE COMPLEXITY  Is interactive complexity present?   No  Reason:  N/A  Additional Supporting Information:  N/A       Electronically signed by TORI Boone on 6/15/2022 at 8:58 AM

## 2022-06-23 ENCOUNTER — OFFICE VISIT (OUTPATIENT)
Dept: PHYSICAL MEDICINE AND REHAB | Age: 37
End: 2022-06-23
Payer: MEDICAID

## 2022-06-23 VITALS
WEIGHT: 250 LBS | DIASTOLIC BLOOD PRESSURE: 89 MMHG | BODY MASS INDEX: 44.29 KG/M2 | HEART RATE: 83 BPM | SYSTOLIC BLOOD PRESSURE: 128 MMHG

## 2022-06-23 DIAGNOSIS — M54.2 CHRONIC NECK PAIN: Primary | ICD-10-CM

## 2022-06-23 DIAGNOSIS — G89.29 CHRONIC LEFT SHOULDER PAIN: ICD-10-CM

## 2022-06-23 DIAGNOSIS — G89.29 CHRONIC NECK PAIN: Primary | ICD-10-CM

## 2022-06-23 DIAGNOSIS — M25.512 CHRONIC LEFT SHOULDER PAIN: ICD-10-CM

## 2022-06-23 PROCEDURE — 99214 OFFICE O/P EST MOD 30 MIN: CPT | Performed by: STUDENT IN AN ORGANIZED HEALTH CARE EDUCATION/TRAINING PROGRAM

## 2022-06-23 RX ORDER — NORTRIPTYLINE HYDROCHLORIDE 25 MG/1
50 CAPSULE ORAL NIGHTLY
Qty: 60 CAPSULE | Refills: 1 | Status: SHIPPED | OUTPATIENT
Start: 2022-06-23 | End: 2022-09-01

## 2022-06-23 NOTE — PROGRESS NOTES
[address]  [address] [address]  Dept: [phone]  Dept Fax: [phone]    Outpatient Followup Note    Paco Parham is a 39y.o.-year-old female presenting for follow up of neck and left shoulder pain. HPI:     She was last seen on 5/12/22 for initial evaluation of neck and left shoulder pain. Since that time, she reports that pain is about the same overall. She continues to report stabbing pain at the base of the neck posteriorly, over the left trapezius muscle, and to the left shoulder with radiation to the 4th and 5th digits of the left hand. She notes ongoing tingling, weakness, and intermittent edema in the left upper limb as well. She states that she is still having difficulty sleeping with only an average of 2-3 hours of sleep per night. She started nortriptyline and feels like it helped a little bit. She denies any side effects of the medication. She is no longer taking hydroxyzine for sleep due to ineffectiveness. She states that she has been trying to do some exercises in her pool at home but has to take breaks due to pain. She reports that she has cervical spine injections with pain management scheduled for 7/11/22. Past Medical History:   Diagnosis Date    Back pain     herniated disc    Kidney infection     Kidney stone     Migraines     Ovarian cyst       Past Surgical History:   Procedure Laterality Date    DILATION AND CURETTAGE OF UTERUS      ENDOMETRIAL ABLATION      HYSTERECTOMY (CERVIX STATUS UNKNOWN)      TUBAL LIGATION       No family history on file.      Social History     Socioeconomic History    Marital status:      Spouse name: None    Number of children: None    Years of education: None    Highest education level: None   Occupational History    None   Tobacco Use    Smoking status: Never Smoker    Smokeless tobacco: Never Used    Tobacco comment: uses vape   Vaping Use    Vaping Use: Every day    Substances: Never, Flavoring   Substance and Sexual Activity    Alcohol use: No    Drug use: No    Sexual activity: Yes     Partners: Male   Other Topics Concern    None   Social History Narrative    None     Social Determinants of Health     Financial Resource Strain: High Risk    Difficulty of Paying Living Expenses: Very hard   Food Insecurity: Food Insecurity Present    Worried About Running Out of Food in the Last Year: Often true    Say of Food in the Last Year: Often true   Transportation Needs: No Transportation Needs    Lack of Transportation (Medical): No    Lack of Transportation (Non-Medical):  No   Physical Activity:     Days of Exercise per Week: Not on file    Minutes of Exercise per Session: Not on file   Stress:     Feeling of Stress : Not on file   Social Connections:     Frequency of Communication with Friends and Family: Not on file    Frequency of Social Gatherings with Friends and Family: Not on file    Attends Faith Services: Not on file    Active Member of [address] [place] [address] or Organizations: Not on file    Attends Club or Organization Meetings: Not on file    Marital Status: Not on file   Intimate Partner Violence:     Fear of Current or Ex-Partner: Not on file    Emotionally Abused: Not on file    Physically Abused: Not on file    Sexually Abused: Not on file   Housing Stability:     Unable to Pay for Housing in the Last Year: Not on file    Number of [place] in the Last Year: Not on file    Unstable Housing in the Last Year: Not on file       Allergies   Allergen Reactions    Latex     Imitrex [Sumatriptan] Anaphylaxis    Zoloft [Sertraline Hcl] Anaphylaxis    Zomig [Zolmitriptan] Anaphylaxis    Pcn [Penicillins] Other (See Comments)     \"Upset stomach\"       Current Outpatient Medications   Medication Sig Dispense Refill    nortriptyline (PAMELOR) 25 MG capsule Take 2 capsules by mouth nightly 60 capsule 1    lidocaine (LIDODERM) 5 % Place 1 patch onto the skin daily 12 hours on, 12 hours off.  cyclobenzaprine (FLEXERIL) 10 MG tablet Take 10 mg by mouth 3 times daily as needed for Muscle spasms      meloxicam (MOBIC) 15 MG tablet Take 1 tablet by mouth daily as needed for Pain 30 tablet 0    ondansetron (ZOFRAN ODT) 4 MG disintegrating tablet Take 1 tablet by mouth every 8 hours as needed for Nausea 10 tablet 0    Cyanocobalamin (VITAMIN B 12 PO) Take by mouth 2 times daily      acetaminophen (TYLENOL) 500 MG tablet Take 1,000 mg by mouth every 6 hours as needed for Pain.  tiZANidine (ZANAFLEX) 2 MG tablet tizanidine 2 mg tablet   TAKE 1 TABLET BY MOUTH THREE TIMES DAILY AS NEEDED FOR MUSCLE SPASM / NECK PAIN (Patient not taking: Reported on 6/23/2022)      liraglutide-weight management (SAXENDA) 18 MG/3ML SOPN Inject subcutaneously once daily; 0.6 mg 1st week, 1.2 mg week 2, 1.8 mg week 3, 2.4 mg week 4, 3.0 mg thereafter (Patient not taking: Reported on 6/23/2022) 1 pen 0     No current facility-administered medications for this visit. Subjective:      Review of Systems   Constitutional: Positive for activity change. Musculoskeletal: Positive for arthralgias and neck pain. Neurological: Positive for weakness and numbness. Psychiatric/Behavioral: Positive for sleep disturbance. Objective:     Physical Exam  /89   Pulse 83   Wt 250 lb (113.4 kg)   LMP 09/16/2017   Breastfeeding No   BMI 44.29 kg/m²     Constitutional: She appears well-developed and well-nourished. In no distress. HEENT: NCAT, EOM grossly intact. Hearing grossly intact. Pulmonary/Chest: Respirations WNL and unlabored. MSK:  Diffuse tenderness to palpation of the left neck and shoulder. Decreased cervical spine ROM with rotation; pain with all movements. Decreased ROM of the left shoulder.   Strength 4/5 with left elbow flexion, elbow extension, and finger flexion. Neurological: She is alert and oriented to person, place, and time. Tingling with testing of sensation to light touch in the left 5th digit, medial forearm, and lateral arm. Skin: Skin is warm dry and intact with good turgor. Psychiatric: She has a normal mood and affect. Her behavior is normal. Thought content normal.    Nursing note and vitals reviewed. Assessment:       Diagnosis Orders   1. Chronic neck pain     2.  Chronic left shoulder pain          Plan:      - Provided prescription for increased dose of nortriptyline, as below, to see if this helps with pain and sleep  - She has cervical spine injections planned with pain management on 7/11/22  - Continue trying to use left upper limb and doing exercises in the pool      Orders Placed This Encounter   Medications    nortriptyline (PAMELOR) 25 MG capsule     Sig: Take 2 capsules by mouth nightly     Dispense:  60 capsule     Refill:  1       Follow up 4-6 weeks      Electronically signed by Sierra Rubalcava MD on 7/10/2022 at 1:53 PM.

## 2022-07-25 LAB
SEDIMENTATION RATE, ERYTHROCYTE: 34
VITAMIN D 25-HYDROXY: 38.9
VITAMIN D2, 25 HYDROXY: NORMAL
VITAMIN D3,25 HYDROXY: NORMAL

## 2022-07-29 ENCOUNTER — SCHEDULED TELEPHONE ENCOUNTER (OUTPATIENT)
Dept: PRIMARY CARE CLINIC | Age: 37
End: 2022-07-29
Payer: MEDICAID

## 2022-07-29 DIAGNOSIS — M25.512 CHRONIC LEFT SHOULDER PAIN: ICD-10-CM

## 2022-07-29 DIAGNOSIS — G89.29 CHRONIC LEFT SHOULDER PAIN: ICD-10-CM
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
## 2022-07-29 DIAGNOSIS — S16.1XXS REPETITIVE STRAIN INJURY OF CERVICAL SPINE, SEQUELA: Primary | ICD-10-CM

## 2022-07-29 PROCEDURE — 98967 PH1 ASSMT&MGMT NQHP 11-20: CPT | Performed by: PHYSICIAN ASSISTANT

## 2022-07-29 NOTE — PROGRESS NOTES
Karma Briscoe is a 39 y.o. female evaluated via telephone on 7/29/2022 for Forms (Discuss short term disability for left shoulder pain, advised by Dr. Linda Connelly office to discuss with PCP)      Documentation:  I communicated with the patient and/or health care decision maker about    Patient presents as a phone call encounter for discussion of short-term disability. Patient has history of chronic neck and left shoulder pain. She has been evaluated by orthopedics without significant findings. She is currently under the care of PMNR, rheumatology, pain management. She reports she has had 1 cervical spine injection with minimal benefit. She states her rheumatologist has ordered blood work and an EMG. She is awaiting results. Patient is extremely worried as she does not feel she is able to work and does not want to get penalized. She states that her current pain is limiting ability to lift, perform activities, function. Details of this discussion including any medical advice provided:   Chronic pain of shoulder and neck: Discussed with patient that I can only temporarily extend short-term disability. I do recommend this comes from the specialist that are currently treating her. I will request for records from her rheumatologist, pain management specialist, [address] [place] [address]. She is to continue current treatment plan. No other additional questions. Total Time: minutes: 11-20 minutes    Karma Briscoe was evaluated through a synchronous (real-time) audio encounter. Patient identification was verified at the start of the visit. She (or guardian if applicable) is aware that this is a billable service, which includes applicable co-pays. This visit was conducted with the patient's (and/or legal guardian's) verbal consent. She has not had a related appointment within my department in the past 7 days or scheduled within the next 24 hours. The patient was located at Home: [facility] [place] [address]  [address]  [facility] [address].  The provider was located at [place] [address] (Misha Moritz Dept): [address] [place] [address] [address]  [address] [place] [address]  Arnulfo Dunham,  [facility] 36..     Note: not billable if this call serves to triage the patient into an appointment for the relevant concern    Pavel Jensen PA-C

## 2022-08-08 ENCOUNTER — HOSPITAL ENCOUNTER (OUTPATIENT)
Dept: PHYSICAL THERAPY | Facility: CLINIC | Age: 37
Setting detail: THERAPIES SERIES
Discharge: HOME | End: 2022-08-08
Payer: MEDICAID

## 2022-08-08 PROCEDURE — 97110 THERAPEUTIC EXERCISES: CPT

## 2022-08-08 PROCEDURE — 97162 PT EVAL MOD COMPLEX 30 MIN: CPT

## 2022-08-08 NOTE — CONSULTS
[] [facility]) - [facility] - [facility] &  Therapy  [address]  [phone]  F: [phone] [] [address]  [employer] [address]   [address]  P: [phone]  F: [phone] [] [address] &  Therapy  [address]  P: [phone]  F: [phone] [x] [address]  P: [phone]  F: [phone] [] [address]  [facility]   [address]   [place]: [phone]  F: [phone]      Physical Therapy Spine Evaluation    Date:  2022  Patient: Tati Jones  : 1985  MRN: [id number]  Physician: Rosalia Barrett DO   Insurance: [place] Medicaid- 30 visits HARD MAX  Medical Diagnosis:    M54.12 (ICD-10-CM) - Radiculopathy, cervical region   M50.222 (ICD-10-CM) - Other cervical disc displacement at C5-C6 level   M47.812 (ICD-10-CM) - Spondylosis without myelopathy or radiculopathy, cervical region   M54.2 (ICD-10-CM) - Cervicalgia   Rehab Codes:   M54.12 (ICD-10-CM) - Radiculopathy, cervical region   M50.222 (ICD-10-CM) - Other cervical disc displacement at C5-C6 level   M47.812 (ICD-10-CM) - Spondylosis without myelopathy or radiculopathy, cervical region   M54.2 (ICD-10-CM) - Cervicalgia   Onset Date: 2021- work injury- see below  Next 's appt.: TBD    Subjective:   CC:    C5-6 injury while at work- underwent an ablation and PT services. Reports undergoing PT services x3 episodes of care for these conditions- last episode in January- x3 visits approximately- no relief, caused worse pain. Then underwent MRI of shoulder and cervical spine- see below. Instructed to visit pain management- underwent an injection- caused severe pain. Plans to undergo an EMG through  [facility]- awaiting prior auth through insurance.   No further follow-up scheduled with pain management. Also pending rhuem diagnosis. Injections within L shoulder as well- unclear relief- x3 total.     HPI: 8/17/2021- work injury; see above     PMHx: [] Unremarkable [] Diabetes [] HTN  [] Pacemaker   [] MI/Heart Problems [] Cancer [x] Arthritis [x] Other: D&C 4/08; tubal ligation 2012; cervical disc ablation 2017; uterine ablation 2018; hystectomy 2018. [] Refer to full medical chart  In EPIC     Comorbidities:   [x] Obesity [] Dialysis  [] N/A   [] Asthma/COPD [] Dementia [] Other:   [] Stroke [] Sleep apnea [] Other:   [] Vascular disease [] Rheumatic disease [] Other:     Tests: [] X-Ray: [x] MRI:  [] Other: L shoulder and cervical spine. Impression   1. Mild supraspinatus and infraspinatus tendinosis. No partial or   full-thickness rotator cuff tear. 2. No discrete labral tear or paralabral cyst formation. 3. No acute osseous abnormality. Impression   1. Minimal disc bulge indenting the anterior thecal sac at C5-C6. No central   spinal canal or foraminal narrowing. 2. Abnormal low T1 signal in the marrow which is likely related to red marrow   versus anemia. Correlate with the patient's CBC. Medications: [x] Refer to full medical record [] None [] Other: Tylenol- no relief.    Allergies:      [x] Refer to full medical record [] None [] Other:    Function:  Hand Dominance  [x] Right  [x] Left- ambi  Employer [employer]    Job Status []  Normal duty   [] Light duty   [x] Off due to condition    []  Retired   [] Not employed   [] Disability  [] Other:  []  Return to work: no anticipated RTW date as of yet; they will no longer accommodate restrictions    Work activities/duties Shopping for [employer]      Pain:  [x] Yes  [] No Location: cervical spine, L shoulder- posterior and into last two digits  Pain Rating: (0-10 scale) 7-10/10- over this past week   Pain altered Tx:  [] Yes  [x] No  Action: N/A  Symptoms:  [] Improving [x] Worsening [x] Same  Better:  [] AM    [] PM    [] Sit    [] Rise/Sit    []Stand    [] Walk    [] Lying    [] Other:  Worse: [] AM    [] PM    [] Sit    [] Rise/Sit    []Stand    [] Walk    [] Lying    [] Bend                      [] Valsalva    [] Other:  Sleep: [] OK    [x] Disturbed- see above     Reports posterior arm into last two digits of L hand N/T- also intermittent through posterior shoulder. Also sharp, stabbing pain from cervical spine into L UE- with R cervical rotation, leaning down/looking down, donning her bra, elbow flexion. Denies groin N/T. Denies all other accident, injury, LOB, falls, MVAs since last August to aggravate her symptoms. Migraines- no medical management- less frequent compared with younger.  weakness- but denies dropping objects from either hand.      List of Red Flags:    Cervical Myelopathy Normal (-)/Abnormal (+)   Sensory disturbances in hand -   Wasting of hand muscles -   Unsteady gait -   Nascimento's reflex -   Hyper-reflexia -   Bladder and bowel problems- incontinence or constipation of urine or fecal matter -   Multi-segmental weakness and/or sensory disturbances -     Neoplastic Conditions Normal (-)/Abnormal (+)   Age > 48 y.o. -   Previous history of cancer +; breast cancer, uterine cancer within mother; sisters with breast cancer as well    Unexplained weight loss- >10lb in 1 week -   Constant pain -   No relief of pain with bed rest -   Night pain- not relieved with change in body position -     Upper Cervical Ligamentous Instabilities Normal (-)/Abnormal (+)   Occipital headache and numbness -   Severe limitation during neck ROM in all directions -   Signs of cervical myelopathy -     Vertebral Artery Insufficiencies- VBI Normal (-)/Abnormal (+)   Drop attack -   Dizziness/light headedness related to neck movement -   Dysphasia -   Dysarthria -   Diplopia -   Positive cranial nerve signs -     Objective:      STRENGTH  ROM    Left Right Cervical    C5 Shld Abd 4; 75- A; P WNL- AROM and strength Flexion 20; P   Shld Flexion 4-; 75- A; P WNL Extension 15; P   Shld IR 4; P WNL Rotation L 45 R 25; P   Shld ER 4; P WNL Sidebend L 30 R 20; P   C6 Elb Flex   Retraction 50% ROM with inc P   C7 Elb Ext       C8 EPL       T1 Fing Abd                          UE/LE                                               TESTS (+/-) LEFT RIGHT Not Tested   Cerv. Comp   [x]   Cerv. Distraction   [x]   Cerv. Alar/Transverse N N []   Vertebral Artery N N []   Adsons   [x]   Brent Betters   [x]   Froylan Tests ? Pain ? Pain No Change Not Tested   Rep Prot [] [] [] [x]   Rep Retract [x] [] [] []     Attempted repeated retractions- reports immediate inc in P- did not continue. Reports inc L UE N/T with slight palpation of C2 spinous process for alar ligament testing- but denies all other symptoms. OBSERVATION No Deficit Deficit Not Tested Comments   Posture       Forward Head [] [x] []    Rounded Shoulders [] [x] []    Kyphosis [] [x] [] Inc cervical.   Lordosis [x] [] []    Lateral Shift [x] [] []    Scoliosis [x] [] []      Did not perform palpation assessment as reports sig TTP- and demos during cervical ligament testing. Functional Test: NDI Score: 70% functionally impaired     Assessment:    Patient is a 39 y.o. pleasant female who presents to physical therapy initial evaluation with signs and symptoms consistent with potential chronic cervical into L UE pain and symptoms- N/T into last two digits. Patient demonstrates impairments and symptoms as detailed below in therapy goals. Patient currently limited in ADLs and work with L UE secondary to these impairments and increased symptoms. Patient would benefit from continued physical therapy services in order to address these impairments and symptoms, and return to QOL, ADLs, work. Anticipated frequency detailed above.  Prognosis limited secondary to prolonged or chronic history of current condition; previous conservative measures- PT services, ablation, injections- all with fleeting relief; constant posterior L UE and last two digits of hand N/T; sig limited at initial evaluation- justifying a low complexity evaluation. If unable to achieve significant improvements within six to ten visits, will consult referring physician and consider a follow-up visit with said physician. Patient verbalized understanding and agreement to all aforementioned statements. Patient would benefit from skilled physical therapy services in order to: inc cervical spine AROM, L shoulder AROM, and overall improve tolerance for ADLs and work. Problems:    [x] ? Pain:  [x] ? ROM:  [x] ? Strength:  [x] ? Function:  [] Other:      STG: (to be met in 5 treatments)  ? Pain: Patient will report < 7/10 pain at rest and < 10/10 pain with active movement in order to improve QOL. ? ROM: Will demo 5-10 deg inc in all cervical spine AROM, same with L shoulder ABD/FLEX- all with < 7/10 P- in order to improve functional mobility. ? Strength: Will demo 4+/5 gross L shoulder strength in order to better match R and improve light ADLs. ? Function: Patient will report centralization [out of L UE, last two digits], decreased intensity [< 7/10], or decreased frequency [< daily] of radicular symptoms in order to indicate decreased inflammation and improve ADLs and QOL. Patient to be independent with home exercise program as demonstrated by performance with correct form without cues. LTG: (to be met in 10 treatments)  Will report 5/10 P at rest and 7/10 P with active movement for improved QOL. Improve NDI to < 70%    Patient goals: \"to reduce pain and regain more function. \"     Rehab Potential:  [] Good  [x] Fair  [x] Poor   Suggested Professional Referral:  [] No  [x] Yes: potential back to referring MD if no success with PT services.    Barriers to Goal Achievement:  [] No  [x] Yes: prolonged or chronic history of current condition; previous conservative measures- PT services, ablation, injections- all with fleeting relief; constant posterior L UE and last two digits of hand N/T; sig limited at initial evaluation. Domestic Concerns:  [x] No  [] Yes:    Pt. Education:  [x] Plans/Goals, Risks/Benefits discussed  [] Home exercise program  Method of Education: [x] Verbal  [] Demo  [x] Written  Comprehension of Education:  [x] Verbalizes understanding. [] Demonstrates understanding. [x] Needs Review. [] Demonstrates/verbalizes understanding of HEP/Ed previously given. Treatment Plan:  [x] Therapeutic Exercise   23937  [] Iontophoresis: 4 mg/mL Dexamethasone Sodium Phosphate  mAmin  76355   [x] Therapeutic Activity  23628 [x] Vasopneumatic cold with compression  72493    [] Gait Training   93912 [] Ultrasound   55026   [] Neuromuscular Re-education  02455 [] Electrical Stimulation Unattended  06611   [x] Manual Therapy  06912 [] Electrical Stimulation Attended  17081   [x] Instruction in HEP  [] Lumbar/Cervical Traction  52049   [] Aquatic Therapy   62515 [x] Cold/hotpack    [] Massage   52672      [x] Dry Needling, 1 or 2 muscles  43359   [] Biofeedback, first 15 minutes   38425  [] Biofeedback, additional 15 minutes   01666 [x] Dry Needling, 3 or more muscles  45785     []  Medication allergies reviewed for use of    Dexamethasone Sodium Phosphate 4mg/ml     with iontophoresis treatments. Pt is not allergic.     Frequency: 2 x/week for 10 visits    Todays Treatment:  Modalities: MHP/CP/VASO- MAY BEGIN NEXT VISIT  Precautions: chronic cervical into L UE pain and symptoms- N/T into last two digits   Exercises: extensive history per above   Exercise Reps/ Time Weight/ Level Comments   Education   IDN- issued informational handout and consent form- will assess and trial next visit- may benefit from 30 minute sessions, which can include IDN, MHP/CP/VASO- verbalized understanding to all                            Other:    Specific Instructions for next treatment: Assess and begin IDN if deemed appropriate next visit- also MHP, CP, VASO.      Evaluation Complexity:  History (Personal factors, comorbidities) [] 0 [x] 1-2 [] 3+   Exam (limitations, restrictions) [] 1-2 [x] 3 [] 4+   Clinical presentation (progression) [] Stable [x] Evolving  [] Unstable   Decision Making [] Low [x] Moderate [] High    [] Low Complexity [x] Moderate Complexity [] High Complexity     Treatment Charges: Mins Units   [x] Evaluation       []  Low       [x]  Moderate       []  High 35 1   []  Modalities     [x]  Ther Exercise 10 1   []  Manual Therapy     []  Ther Activities     []  Aquatics     []  Vasocompression     []  Other       TOTAL TREATMENT TIME: 45    Time in: 2:02PM     Time out: 2:47PM    Electronically signed by: Grace Monet PT

## 2022-08-08 NOTE — FLOWSHEET NOTE
[] [facility]) [facility] &  Therapy  [address]  [phone]  F: [phone] [] [address]  [facility]  [address]  P: [phone]  F: [phone] [] [address] &  Therapy  [address]  P: [phone]  F: [phone] [x] [address]  P: [phone]  F: [phone] [] [address]  [facility]  [address]  [place] Go: [phone]  F: [phone]      THERAPY RESPONSIBILITY OF CARE TRANSFER FORM     PATIENT NAME: Rhonda Abebe  MRN: [id number]   : 1985    TRANSFERRING FACILITY:    [] [facility]   [] [facility] Outpatient   []  [facility]   [] [facility]   [] [facility]   [] [place] [facility]   [] [facility]  [x] [place]   [] Other:     ACCEPTING FACILITY   [] [facility]   [] [facility] Outpatient   []  [facility]   [] [facility]   [] [facility]   [] [facility]   [] [facility] Outpatient  [x] [place]   [] Other:        REASON FOR TRANSFER: requires dry needling only. TRANSFER OF CARE:    I am transferring the care of the above patient to: Mitch Gomes, KERRY Pena PT  2022      ACCEPTANCE OF CARE:     I am accepting the care of the above patient.  Mitch Gomes, PT

## 2022-08-11 ENCOUNTER — OFFICE VISIT (OUTPATIENT)
Dept: PRIMARY CARE CLINIC | Age: 37
End: 2022-08-11
Payer: MEDICAID

## 2022-08-11 VITALS
DIASTOLIC BLOOD PRESSURE: 84 MMHG | BODY MASS INDEX: 44.33 KG/M2 | OXYGEN SATURATION: 98 % | WEIGHT: 250.2 LBS | RESPIRATION RATE: 16 BRPM | SYSTOLIC BLOOD PRESSURE: 122 MMHG | HEART RATE: 97 BPM | HEIGHT: 63 IN

## 2022-08-11 DIAGNOSIS — F33.1 MODERATE EPISODE OF RECURRENT MAJOR DEPRESSIVE DISORDER (HCC): ICD-10-CM

## 2022-08-11 DIAGNOSIS — S16.1XXS REPETITIVE STRAIN INJURY OF CERVICAL SPINE, SEQUELA: ICD-10-CM

## 2022-08-11 DIAGNOSIS — F43.10 PTSD (POST-TRAUMATIC STRESS DISORDER): ICD-10-CM

## 2022-08-11 DIAGNOSIS — E66.01 MORBID OBESITY DUE TO EXCESS CALORIES (HCC): Primary | ICD-10-CM

## 2022-08-11 DIAGNOSIS — Z71.3 DIETARY COUNSELING: ICD-10-CM
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
## 2022-08-11 PROCEDURE — 99214 OFFICE O/P EST MOD 30 MIN: CPT | Performed by: PHYSICIAN ASSISTANT

## 2022-08-11 RX ORDER — MELOXICAM 15 MG/1
15 TABLET ORAL DAILY PRN
Qty: 30 TABLET | Refills: 2 | Status: SHIPPED | OUTPATIENT
Start: 2022-08-11 | End: 2022-09-09

## 2022-08-11 ASSESSMENT — ENCOUNTER SYMPTOMS
BACK PAIN: 0
DIARRHEA: 0
CONSTIPATION: 0
NAUSEA: 0
VOMITING: 0
COUGH: 0
RHINORRHEA: 0
ABDOMINAL PAIN: 0
SINUS PAIN: 0
SHORTNESS OF BREATH: 0

## 2022-08-11 NOTE — PROGRESS NOTES
[facility] PRIMARY CARE  [facility] [address] [place] [facility] [address]  [address] [address]  Dept: [phone]  Dept Fax: [phone]    Good Noriega is a 39 y.o. female who presents today for her medical conditions/complaints as noted below. Chief Complaint   Patient presents with    Weight Management     Discuss starting Adipex       HPI:     Patient presents to the office for discussion on weight management. She is very interested in starting Adipex. She has joined weight loss group online and knows of many people who have success with this medication. She understands the concept and instructions with medications. No prior Adipex use. Patient has been making small changes to lifestyle habits to include increasing her water and reducing carbohydrates. Unfortunately she is having difficulty with weight plateau. Otherwise, patient continues to follow with PMNR and rheumatology for chronic left shoulder and cervical spine pain. She states her pain is intermittent and slowly getting better. She goes to physical therapy 2-3 times a week. She is currently using meloxicam and nortriptyline. Patient is following with counseling/therapy for PTSD and depression. She states that this is very beneficial and her mood is improved. She has psychiatry visit later this month. No other concerns or complaints. BP stable. Weight stable.       Hemoglobin A1C (%)   Date Value   2022 5.2             ( goal A1C is < 7)   No results found for: LABMICR  LDL Cholesterol (mg/dL)   Date Value   2022 142 (H)       (goal LDL is <100)   AST (U/L)   Date Value   2022 21     ALT (U/L)   Date Value   2022 35 (H)     BUN (mg/dL)   Date Value   2022 10     BP Readings from Last 3 Encounters:   22 122/84   22 128/89   22 (!) 143/94          (goal 120/80)    Past Medical History:   Diagnosis Date    Back pain     herniated disc    Kidney infection Kidney stone     Migraines     Ovarian cyst       Past Surgical History:   Procedure Laterality Date    DILATION AND CURETTAGE OF UTERUS      ENDOMETRIAL ABLATION      HYSTERECTOMY (CERVIX STATUS UNKNOWN)      TUBAL LIGATION         History reviewed. No pertinent family history. Social History     Tobacco Use    Smoking status: Never    Smokeless tobacco: Never    Tobacco comments:     uses vape   Substance Use Topics    Alcohol use: No      Current Outpatient Medications   Medication Sig Dispense Refill    meloxicam (MOBIC) 15 MG tablet Take 1 tablet by mouth daily as needed for Pain 30 tablet 2    nortriptyline (PAMELOR) 25 MG capsule Take 2 capsules by mouth nightly 60 capsule 1    tiZANidine (ZANAFLEX) 2 MG tablet tizanidine 2 mg tablet   TAKE 1 TABLET BY MOUTH THREE TIMES DAILY AS NEEDED FOR MUSCLE SPASM / NECK PAIN      lidocaine (LIDODERM) 5 % Place 1 patch onto the skin daily 12 hours on, 12 hours off.      cyclobenzaprine (FLEXERIL) 10 MG tablet Take 10 mg by mouth 3 times daily as needed for Muscle spasms      ondansetron (ZOFRAN ODT) 4 MG disintegrating tablet Take 1 tablet by mouth every 8 hours as needed for Nausea 10 tablet 0    Cyanocobalamin (VITAMIN B 12 PO) Take by mouth 2 times daily      acetaminophen (TYLENOL) 500 MG tablet Take 1,000 mg by mouth every 6 hours as needed for Pain. No current facility-administered medications for this visit.      Allergies   Allergen Reactions    Latex     Imitrex [Sumatriptan] Anaphylaxis    Zoloft [Sertraline Hcl] Anaphylaxis    Zomig [Zolmitriptan] Anaphylaxis    Pcn [Penicillins] Other (See Comments)     \"Upset stomach\"       Health Maintenance   Topic Date Due    COVID-19 Vaccine (1) Never done    Varicella vaccine (1 of 2 - 2-dose childhood series) Never done    HIV screen  Never done    Hepatitis C screen  Never done    DTaP/Tdap/Td vaccine (1 - Tdap) Never done    Flu vaccine (1) 09/01/2022    Depression Monitoring  05/31/2023    Hepatitis A vaccine  Aged Out    Hepatitis B vaccine  Aged Out    Hib vaccine  Aged Out    Meningococcal (ACWY) vaccine  Aged Out    Pneumococcal 0-64 years Vaccine  Aged Out       Subjective:      Review of Systems   Constitutional:  Negative for chills, fatigue and fever. HENT:  Negative for congestion, rhinorrhea and sinus pain. Respiratory:  Negative for cough and shortness of breath. Cardiovascular:  Negative for chest pain and leg swelling. Gastrointestinal:  Negative for abdominal pain, constipation, diarrhea, nausea and vomiting. Genitourinary:  Negative for difficulty urinating, frequency and urgency. Musculoskeletal:  Positive for arthralgias. Negative for back pain and myalgias. Neurological:  Negative for dizziness and headaches. Psychiatric/Behavioral:  Positive for dysphoric mood. Negative for confusion and sleep disturbance. The patient is not nervous/anxious. All other systems reviewed and are negative. Objective:     Physical Exam  Vitals and nursing note reviewed. Constitutional:       General: She is not in acute distress. Appearance: Normal appearance. She is obese. HENT:      Head: Normocephalic. Right Ear: External ear normal.      Left Ear: External ear normal.      Mouth/Throat:      Mouth: Mucous membranes are moist.   Eyes:      Extraocular Movements: Extraocular movements intact. Conjunctiva/sclera: Conjunctivae normal.      Pupils: Pupils are equal, round, and reactive to light. Cardiovascular:      Rate and Rhythm: Normal rate and regular rhythm. Pulses: Normal pulses. Heart sounds: Normal heart sounds. No murmur heard. Pulmonary:      Effort: Pulmonary effort is normal. No respiratory distress. Breath sounds: Normal breath sounds. Musculoskeletal:      Cervical back: Normal range of motion. Right lower leg: No edema. Left lower leg: No edema. Lymphadenopathy:      Cervical: No cervical adenopathy.    Skin:     General: Skin is warm.      Capillary Refill: Capillary refill takes less than 2 seconds. Neurological:      General: No focal deficit present. Mental Status: She is alert and oriented to person, place, and time. Psychiatric:         Mood and Affect: Mood normal.         Behavior: Behavior normal.     /84 (Site: Left Upper Arm, Position: Sitting, Cuff Size: Large Adult)   Pulse 97   Resp 16   Ht 5' 3\" (1.6 m)   Wt 250 lb 3.2 oz (113.5 kg)   LMP 09/16/2017   SpO2 98%   BMI 44.32 kg/m²     Assessment:       ICD-10-CM    1. Morbid obesity due to excess calories (HCC)  E66.01       2. Dietary counseling  Z71.3       3. Repetitive strain injury of cervical spine, sequela  S16. 1XXS meloxicam (MOBIC) 15 MG tablet    X50. 3XXS       4. PTSD (post-traumatic stress disorder)  F43.10       5. Moderate episode of recurrent major depressive disorder (HCC)  F33.1                Plan:      1,2. Patient with morbid obesity. We had a long discussion about the importance of dietary changes and consistency. Discussed calorie reduction, reducing carbohydrates, increasing protein and healthy fats. Plan to discuss patient with Dr. Gilbert Mendoza for Adipex initiation. 3.  Patient to continue following with PMNR, rheumatology for management of chronic left shoulder/cervical spine pain. Continue meloxicam once daily. 4, 5. Patient to continue following with therapy and psychiatry for management of PTSD and depression. Return in about 30 days (around 9/10/2022) for medication recheck and Adipex. No orders of the defined types were placed in this encounter. Patient given educational materials - see patient instructions. Discussed use, benefit, and side effects of prescribed medications. All patient questions answered. Pt voiced understanding. Reviewed health maintenance. Instructed to continue current medications, diet and exercise. Patient agreed with treatment plan. Follow up as directed.         Electronically signed by Jovany Contreras PA-C on 8/11/2022 at 10:19 AM

## 2022-08-12 DIAGNOSIS — E66.01 OBESITY, MORBID, BMI 40.0-49.9 (HCC): Primary | ICD-10-CM

## 2022-08-12 RX ORDER — PHENTERMINE HYDROCHLORIDE 37.5 MG/1
37.5 TABLET ORAL
Qty: 30 TABLET | Refills: 0 | Status: SHIPPED | OUTPATIENT
Start: 2022-08-12 | End: 2022-09-12 | Stop reason: SDUPTHER

## 2022-08-12 NOTE — TELEPHONE ENCOUNTER
Patient has agreed to start course of Adipex, ok to fill per PCP and collaborating physician.     Script pended for approval.

## 2022-08-15 ENCOUNTER — HOSPITAL ENCOUNTER (OUTPATIENT)
Dept: PHYSICAL THERAPY | Facility: CLINIC | Age: 37
Setting detail: THERAPIES SERIES
Discharge: HOME | End: 2022-08-15
Payer: MEDICAID

## 2022-08-15 PROCEDURE — 97530 THERAPEUTIC ACTIVITIES: CPT

## 2022-08-15 PROCEDURE — 97140 MANUAL THERAPY 1/> REGIONS: CPT

## 2022-08-15 NOTE — FLOWSHEET NOTE
[] [facility] [address]  [address]  [phone]  F: [phone] [] [address]  [place] [address]   [address]  P: [phone]  F: [phone] [] [place]  Therapy  [address] [facility]  P: [phone]  F: [phone] [x] [address]  P: [phone]  F: [phone] [] [address]  [facility]   [address]   [place]: [phone]  F: [phone]      Physical Therapy Daily Treatment Note    Date:  8/15/2022  Patient Name:  Ruperto Johnson    :  1985  MRN: [id number]  Physician: Isatu Rincon DO                               Insurance: 100 [email or web address]- 30 visits HARD MAX  Medical Diagnosis:     M54.12 (ICD-10-CM) - Radiculopathy, cervical region   M50.222 (ICD-10-CM) - Other cervical disc displacement at C5-C6 level   M47.812 (ICD-10-CM) - Spondylosis without myelopathy or radiculopathy, cervical region   M54.2 (ICD-10-CM) - Cervicalgia   Rehab Codes:   M54.12 (ICD-10-CM) - Radiculopathy, cervical region   M50.222 (ICD-10-CM) - Other cervical disc displacement at C5-C6 level   M47.812 (ICD-10-CM) - Spondylosis without myelopathy or radiculopathy, cervical region   M54.2 (ICD-10-CM) - Cervicalgia   Onset Date: 2021- work injury- see below                 Next 's appt.: TBD  Visit# / total visits: 2/10    Cancels/No Shows: 0    Subjective:    Pain:  [x] Yes  [] No Location: left cervical spine and Left UE Pain Rating: (0-10 scale) 7/10  Pain altered Tx:  [x] No  [] Yes  Action:  Comments: Patient reports ongoing neck and left UE pain and tingling, the neck pain is more concerning the tingling. She is not working dur to neck pain.     Objective:  Modalities: MHP/CP/VASO- MAY BEGIN NEXT VISIT  Precautions: chronic cervical into L UE pain and symptoms- N/T into last two digits      Exercise Reps/ Time Weight/ Level Comments   Education 28'                             Other:  ELTON: Pain science education with discussion about pain vs tissue damage. We discussed concepts of \"sore but safe\" and the role of the nervous system as our body's alarm system. We discussed the importance of progressive motion, desensitization techniques and cardiovascular exercises. Manual: Prone light MFR to upper traps and splenius, notable sensitivity to pressure. Treatment Charges: Mins Units   []  Modalities     []  Ther Exercise     [x]  Manual Therapy 10 1   [x]  Ther Activities 35 2   []  Aquatics     []  Vasocompression     []  Other     Total Treatment time 45        Assessment: [x] Progressing toward goals. The patient has chronic pain and fear avoidance behavior associated with her pain. She was receptive to pain science education and will benefit from gradual desensitization through MT, movement based therapy and cardiovascular exercises. Pain science concepts will be revisited and reinforced. [] No change. [] Other:  [x] Patient would continue to benefit from skilled physical therapy services in order to:   Patient is a 39 y.o. pleasant female who presents to physical therapy initial evaluation with signs and symptoms consistent with potential chronic cervical into L UE pain and symptoms- N/T into last two digits. Patient demonstrates impairments and symptoms as detailed below in therapy goals. Patient currently limited in ADLs and work with L UE secondary to these impairments and increased symptoms. Patient would benefit from continued physical therapy services in order to address these impairments and symptoms, and return to QOL, ADLs, work. Anticipated frequency detailed above.  Prognosis limited secondary to prolonged or chronic history of current condition; previous conservative measures- PT services, ablation, injections- all with fleeting relief; constant posterior L UE and last two digits of hand N/T; sig limited at initial evaluation- justifying a low complexity evaluation. If unable to achieve significant improvements within six to ten visits, will consult referring physician and consider a follow-up visit with said physician. Patient verbalized understanding and agreement to all aforementioned statements. STG/LTG:  ? Pain: Patient will report < 7/10 pain at rest and < 10/10 pain with active movement in order to improve QOL. ? ROM: Will demo 5-10 deg inc in all cervical spine AROM, same with L shoulder ABD/FLEX- all with < 7/10 P- in order to improve functional mobility. ? Strength: Will demo 4+/5 gross L shoulder strength in order to better match R and improve light ADLs. ? Function: Patient will report centralization [out of L UE, last two digits], decreased intensity [< 7/10], or decreased frequency [< daily] of radicular symptoms in order to indicate decreased inflammation and improve ADLs and QOL. Patient to be independent with home exercise program as demonstrated by performance with correct form without cues. LTG: (to be met in 10 treatments)  Will report 5/10 P at rest and 7/10 P with active movement for improved QOL. Improve NDI to < 70%    Pt. Education:  [x] Yes  [] No  [] Pain science concepts  Method of Education: [x] Verbal  [x] Demo  [] Written  Comprehension of Education:  [] Verbalizes understanding. [] Demonstrates understanding. [x] Needs review. [] Demonstrates/verbalizes HEP/Ed previously given. Plan: [x] Continue current frequency toward long and short term goals. [x] Specific Instructions for subsequent treatments: Gentle progressive, MT, ROM, neural dynamics, strengthening and cardiovascular exercise.  Pain science education      Time In:12:25 PM           Time Out: 1:15 PM    Electronically signed by:  Kylah Jo, PT

## 2022-08-18 ENCOUNTER — HOSPITAL ENCOUNTER (OUTPATIENT)
Dept: PHYSICAL THERAPY | Facility: CLINIC | Age: 37
Setting detail: THERAPIES SERIES
Discharge: HOME | End: 2022-08-18
Payer: MEDICAID

## 2022-08-18 PROCEDURE — 97110 THERAPEUTIC EXERCISES: CPT

## 2022-08-18 PROCEDURE — 97530 THERAPEUTIC ACTIVITIES: CPT

## 2022-08-18 PROCEDURE — 97140 MANUAL THERAPY 1/> REGIONS: CPT

## 2022-08-18 NOTE — FLOWSHEET NOTE
[] [facility] [address]  [address]  [phone]  F: [phone] [] [address]  [facility]   [address]  P: [phone]  F: [phone] [] [place] [address]  Therapy  [address] [facility]  P: [phone]  F: [phone] [x] [address]  P: [phone]  F: [phone] [] [address]  [facility]   [address]   [place]: [phone]  F: [phone]      Physical Therapy Daily Treatment Note    Date:  2022  Patient Name:  Ashlee George    :  1985  MRN: [id number]  Physician: Yesy Murrieta DO                               Insurance: [place] Medicaid- 30 visits HARD MAX  Medical Diagnosis:     M54.12 (ICD-10-CM) - Radiculopathy, cervical region   M50.222 (ICD-10-CM) - Other cervical disc displacement at C5-C6 level   M47.812 (ICD-10-CM) - Spondylosis without myelopathy or radiculopathy, cervical region   M54.2 (ICD-10-CM) - Cervicalgia   Rehab Codes:   M54.12 (ICD-10-CM) - Radiculopathy, cervical region   M50.222 (ICD-10-CM) - Other cervical disc displacement at C5-C6 level   M47.812 (ICD-10-CM) - Spondylosis without myelopathy or radiculopathy, cervical region   M54.2 (ICD-10-CM) - Cervicalgia   Onset Date: 2021- work injury- see below                 Next 's appt.: TBD  Visit# / total visits: 310    Cancels/No Shows: 0    Subjective:    Pain:  [x] Yes  [] No Location: left cervical spine and Left UE Pain Rating: (0-10 scale) 7-8/10  Pain altered Tx:  [x] No  [] Yes  Action:  Comments: Patient arrives without change in LUE/neck pain    Objective:  Modalities: MHP/CP/VASO- MAY BEGIN NEXT VISIT  Precautions: chronic cervical into L UE pain and symptoms- N/T into last two digits      Exercise Reps/ Time Weight/ Level Comments   Education 15'           PROM- gentle L shoulder x     Gentle ulnar nerve glide x           Other:  ELTON: Discussion regarding POC, Use of gentle MFR vs Dry needling at this time. Pt in agreement. Manual: Prone light MFR to upper traps and splenius, notable sensitivity to pressure. Treatment Charges: Mins Units   []  Modalities     [x]  Ther Exercise 10 1   [x]  Manual Therapy 15 1   [x]  Ther Activities 15 1   []  Aquatics     []  Vasocompression     []  Other     Total Treatment time 40 3       Assessment: [x] Progressing toward goals. Again began session with discussion regarding chronic pain, use of gentle movement to decrease pain and aide in returning full motion. Discussed utilizing manual (gentle MFR) vs Dry needling d/t increased sensitivity. Pt tolerated gentle MFR, followed manual by gentle PROM which pt was also able to tolerate well. [] No change. [] Other:  [x] Patient would continue to benefit from skilled physical therapy services in order to:   Patient is a 39 y.o. pleasant female who presents to physical therapy initial evaluation with signs and symptoms consistent with potential chronic cervical into L UE pain and symptoms- N/T into last two digits. Patient demonstrates impairments and symptoms as detailed below in therapy goals. Patient currently limited in ADLs and work with L UE secondary to these impairments and increased symptoms. Patient would benefit from continued physical therapy services in order to address these impairments and symptoms, and return to QOL, ADLs, work. Anticipated frequency detailed above. Prognosis limited secondary to prolonged or chronic history of current condition; previous conservative measures- PT services, ablation, injections- all with fleeting relief; constant posterior L UE and last two digits of hand N/T; sig limited at initial evaluation- justifying a low complexity evaluation.  If unable to achieve significant improvements within six to ten visits, will consult referring physician and consider a follow-up visit with said physician. Patient verbalized understanding and agreement to all aforementioned statements. STG/LTG:  ? Pain: Patient will report < 7/10 pain at rest and < 10/10 pain with active movement in order to improve QOL. ? ROM: Will demo 5-10 deg inc in all cervical spine AROM, same with L shoulder ABD/FLEX- all with < 7/10 P- in order to improve functional mobility. ? Strength: Will demo 4+/5 gross L shoulder strength in order to better match R and improve light ADLs. ? Function: Patient will report centralization [out of L UE, last two digits], decreased intensity [< 7/10], or decreased frequency [< daily] of radicular symptoms in order to indicate decreased inflammation and improve ADLs and QOL. Patient to be independent with home exercise program as demonstrated by performance with correct form without cues. LTG: (to be met in 10 treatments)  Will report 5/10 P at rest and 7/10 P with active movement for improved QOL. Improve NDI to < 70%    Pt. Education:  [x] Yes  [] No  [] Pain science concepts  Method of Education: [x] Verbal  [x] Demo  [] Written  Comprehension of Education:  [] Verbalizes understanding. [] Demonstrates understanding. [x] Needs review. [] Demonstrates/verbalizes HEP/Ed previously given. Plan: [x] Continue current frequency toward long and short term goals. [x] Specific Instructions for subsequent treatments: Gentle progressive, MT, ROM, neural dynamics, strengthening and cardiovascular exercise.  Pain science education      Time In: 1405         Time Out: 1450    Electronically signed by:  Jose Luis Hargrove, PT

## 2022-08-22 ENCOUNTER — HOSPITAL ENCOUNTER (OUTPATIENT)
Dept: PHYSICAL THERAPY | Facility: CLINIC | Age: 37
Setting detail: THERAPIES SERIES
Discharge: HOME | End: 2022-08-22
Payer: MEDICAID

## 2022-08-25 ENCOUNTER — HOSPITAL ENCOUNTER (OUTPATIENT)
Age: 37
Setting detail: SPECIMEN
Discharge: HOME | End: 2022-08-25

## 2022-08-25 ENCOUNTER — TELEPHONE (OUTPATIENT)
Dept: PRIMARY CARE CLINIC | Age: 37
End: 2022-08-25

## 2022-08-25 LAB
C-REACTIVE PROTEIN: 14 MG/L (ref 0–5)
IGA: 381 MG/DL (ref 70–400)
IGG: 1033 MG/DL (ref 700–1600)
IGM: 130 MG/DL (ref 40–230)
SEDIMENTATION RATE, ERYTHROCYTE: 9 MM/HR (ref 0–20)

## 2022-08-25 NOTE — TELEPHONE ENCOUNTER
----- Message from Augustus Hilton sent at 8/25/2022 10:02 AM EDT -----  Subject: Message to Provider    QUESTIONS  Information for Provider? Paper work for disability was emailed to [employer] but file couldn't be opened they need it re emailed but preferred   fax it should be on the form   ---------------------------------------------------------------------------  --------------  [phone]  [phone]; OK to leave message on voicemail  ---------------------------------------------------------------------------  --------------  SCRIPT ANSWERS  Relationship to Patient?  Self

## 2022-08-26 LAB
% NK (CD56): 10 % (ref 6–29)
AB NK (CD56): 252 /UL (ref 90–600)
ABSOLUTE CD 3: 2066 /UL (ref 411–2061)
ABSOLUTE CD 4 HELPER: 1512 /UL (ref 309–1571)
ABSOLUTE CD 8 (SUPP): 529 /UL (ref 282–999)
ABSOLUTE CD19 B CELL: 176 /UL (ref 71–567)
ALBUMIN (CALCULATED): 5 G/DL (ref 3.2–5.2)
ALBUMIN PERCENT: 64 % (ref 45–65)
ALPHA 1 PERCENT: 2 % (ref 3–6)
ALPHA 2 PERCENT: 10 % (ref 6–13)
ALPHA-1-GLOBULIN: 0.2 G/DL (ref 0.1–0.4)
ALPHA-2-GLOBULIN: 0.7 G/DL (ref 0.5–0.9)
BETA GLOBULIN: 1 G/DL (ref 0.5–1.1)
BETA PERCENT: 13 % (ref 11–19)
C-REACTIVE PROTEIN: 13.9 MG/L (ref 0–5)
CD19 B CELL: 7 % (ref 5–25)
CD3 % TOTAL T CELLS: 82 % (ref 57–94)
CD4 % HELPER T CELL: 60 % (ref 27–64)
CD4:CD8: 2.86 (ref 0.6–2.8)
CD8 % SUPPRESSOR T CELL: 21 % (ref 17–48)
GAMMA GLOBULIN %: 12 % (ref 9–20)
GAMMA GLOBULIN: 0.9 G/DL (ref 0.5–1.5)
LYMPHOCYTES # BLD: 24 % (ref 24–44)
PATHOLOGIST: ABNORMAL
PROTEIN ELECTROPHORESIS, SERUM: ABNORMAL
TOTAL PROT. SUM,%: 101 % (ref 98–102)
TOTAL PROT. SUM: 7.8 G/DL (ref 6.3–8.2)
TOTAL PROTEIN: 7.8 G/DL (ref 6.4–8.3)
VITAMIN D 25-HYDROXY: 28.2 NG/ML
WBC # BLD: 10.5 K/UL (ref 3.5–11)

## 2022-08-29 ENCOUNTER — HOSPITAL ENCOUNTER (OUTPATIENT)
Dept: PHYSICAL THERAPY | Facility: CLINIC | Age: 37
Setting detail: THERAPIES SERIES
Discharge: HOME | End: 2022-08-29
Payer: MEDICAID

## 2022-08-29 PROBLEM — M25.519 SHOULDER PAIN: Status: ACTIVE | Noted: 2022-06-13

## 2022-08-29 PROBLEM — M54.12 CERVICAL RADICULOPATHY: Status: ACTIVE | Noted: 2021-08-17

## 2022-08-29 PROBLEM — M54.2 NECK PAIN: Status: ACTIVE | Noted: 2022-06-13

## 2022-08-29 NOTE — FLOWSHEET NOTE
[] [address]  [address]    [phone]  F: [phone]   [] [address]  Located [facility]   [address]  P: [phone]  F: [phone]  [] [address]  [address] [facility]  P: [phone]  F: [phone] [x] [address]  P: [phone]  F: [phone]  [] [address] [facility]   [address]   [place]: [phone]  F: [phone]   [] [address]  [place]: [phone]   F: [phone]     Physical Therapy Cancel/No Show note    Date: 2022  Patient:  Thuy   : 1985  MRN: [id number]    Cancels/No Shows to date: 0    For today's appointment patient:    [x]  Cancelled    [] Rescheduled appointment    [] No-show     Reason given by patient:    []  Patient ill    []  Conflicting appointment    [] No transportation      [] Conflict with work    [] No reason given    [] Weather related    [] COVID-19    [x] Other:      Comments:  Pt states to needing to cancel d/t  just starting a new job and unable to provide transportation      [x] Next appointment was confirmed    Electronically signed by: Marianne Potts PT

## 2022-09-01 ENCOUNTER — HOSPITAL ENCOUNTER (OUTPATIENT)
Dept: PHYSICAL THERAPY | Facility: CLINIC | Age: 37
Setting detail: THERAPIES SERIES
Discharge: HOME | End: 2022-09-01
Payer: MEDICAID

## 2022-09-01 ENCOUNTER — OFFICE VISIT (OUTPATIENT)
Dept: PHYSICAL MEDICINE AND REHAB | Age: 37
End: 2022-09-01
Payer: MEDICAID

## 2022-09-01 VITALS
DIASTOLIC BLOOD PRESSURE: 90 MMHG | HEART RATE: 84 BPM | TEMPERATURE: 97.6 F | SYSTOLIC BLOOD PRESSURE: 140 MMHG | WEIGHT: 244.6 LBS | BODY MASS INDEX: 43.33 KG/M2

## 2022-09-01 DIAGNOSIS — M79.2 NEUROPATHIC PAIN: Primary | ICD-10-CM

## 2022-09-01 DIAGNOSIS — M79.602 LEFT UPPER LIMB PAIN: ICD-10-CM

## 2022-09-01 PROCEDURE — 97110 THERAPEUTIC EXERCISES: CPT

## 2022-09-01 PROCEDURE — 97140 MANUAL THERAPY 1/> REGIONS: CPT

## 2022-09-01 PROCEDURE — 99214 OFFICE O/P EST MOD 30 MIN: CPT | Performed by: STUDENT IN AN ORGANIZED HEALTH CARE EDUCATION/TRAINING PROGRAM

## 2022-09-01 RX ORDER — CELECOXIB 200 MG/1
CAPSULE ORAL
COMMUNITY
End: 2022-10-07 | Stop reason: ALTCHOICE

## 2022-09-01 RX ORDER — PREGABALIN 25 MG/1
CAPSULE ORAL
Qty: 90 CAPSULE | Refills: 0 | Status: SHIPPED | OUTPATIENT
Start: 2022-09-01 | End: 2022-10-07 | Stop reason: ALTCHOICE

## 2022-09-01 NOTE — TELEPHONE ENCOUNTER
Additional forms received from the patient from [facility], forms are awaiting completion after her physical therapy appointments and PMN&R appointment. Forms are with PCP while in [facility].

## 2022-09-01 NOTE — FLOWSHEET NOTE
[] [address]  [address]  [phone]  F: [phone] [] [address]  [facility]   [address]  P: [phone]  F: [phone] [] [facility] [address]  Therapy  [address]  P: [phone]  F: [phone] [x] [address]  P: [phone]  F: [phone] [] [address]  [facility]   [address]   [place]: [phone]  F: [phone]      Physical Therapy Daily Treatment Note    Date:  2022  Patient Name:  Keo Maxwell    :  1985  MRN: [id number]  Physician: Monty Ortega DO                               Insurance: [place] Medicaid- 30 visits HARD MAX  Medical Diagnosis:     M54.12 (ICD-10-CM) - Radiculopathy, cervical region   M50.222 (ICD-10-CM) - Other cervical disc displacement at C5-C6 level   M47.812 (ICD-10-CM) - Spondylosis without myelopathy or radiculopathy, cervical region   M54.2 (ICD-10-CM) - Cervicalgia   Rehab Codes:   M54.12 (ICD-10-CM) - Radiculopathy, cervical region   M50.222 (ICD-10-CM) - Other cervical disc displacement at C5-C6 level   M47.812 (ICD-10-CM) - Spondylosis without myelopathy or radiculopathy, cervical region   M54.2 (ICD-10-CM) - Cervicalgia   Onset Date: 2021- work injury- see below                 Next 's appt.: TBD  Visit# / total visits: 4/10    Cancels/No Shows: 0    Subjective:    Pain:  [x] Yes  [] No Location: left cervical spine and Left UE Pain Rating: (0-10 scale) 7-8/10  Pain altered Tx:  [x] No  [] Yes  Action:  Comments: Patient arrives with increased shoulder pain after last visit. States had a rheumatologist visit yesterday who is unsure of cause of continued pain and inflammation. EMG is scheduled for two weeks from now. Objective:  Modalities: MHP/CP/VASO- MAY BEGIN NEXT VISIT  Precautions: chronic cervical into L UE pain and symptoms- N/T into last two digits      Exercise Reps/ Time Weight/ Level Comments   Education 15'           PROM- gentle L shoulder x     Gentle ulnar nerve glide x           Other:  ELTON: Discussion regarding POC, Use of gentle MFR vs Dry needling at this time. Pt in agreement. Manual:Light MFR to medial delt and bicep      Treatment Charges: Mins Units   [x]  Modalities: MHP 10 0   [x]  Ther Exercise 10 1   [x]  Manual Therapy 15 1   []  Ther Activities     []  Aquatics     []  Vasocompression     []  Other     Total Treatment time 35 2       Assessment: [x] Progressing toward goals. Initiated session with MHP followed by gentle MFR to decrease hypersenstivity to touch. Contineud with gentle PROM with pt only tolerating approx 30deg in each direction (flex and abd). Pt voiced increased pain post session, and would like to hold scheduling additional PT until after EMG. [] No change. [] Other:  [x] Patient would continue to benefit from skilled physical therapy services in order to:   Patient is a 39 y.o. pleasant female who presents to physical therapy initial evaluation with signs and symptoms consistent with potential chronic cervical into L UE pain and symptoms- N/T into last two digits. Patient demonstrates impairments and symptoms as detailed below in therapy goals. Patient currently limited in ADLs and work with L UE secondary to these impairments and increased symptoms. Patient would benefit from continued physical therapy services in order to address these impairments and symptoms, and return to QOL, ADLs, work. Anticipated frequency detailed above.  Prognosis limited secondary to prolonged or chronic history of current condition; previous conservative measures- PT services, ablation, injections- all with fleeting relief; constant posterior L UE and last two digits of hand N/T; sig limited at initial evaluation- justifying a low complexity evaluation. If unable to achieve significant improvements within six to ten visits, will consult referring physician and consider a follow-up visit with said physician. Patient verbalized understanding and agreement to all aforementioned statements. STG/LTG:  ? Pain: Patient will report < 7/10 pain at rest and < 10/10 pain with active movement in order to improve QOL. ? ROM: Will demo 5-10 deg inc in all cervical spine AROM, same with L shoulder ABD/FLEX- all with < 7/10 P- in order to improve functional mobility. ? Strength: Will demo 4+/5 gross L shoulder strength in order to better match R and improve light ADLs. ? Function: Patient will report centralization [out of L UE, last two digits], decreased intensity [< 7/10], or decreased frequency [< daily] of radicular symptoms in order to indicate decreased inflammation and improve ADLs and QOL. Patient to be independent with home exercise program as demonstrated by performance with correct form without cues. LTG: (to be met in 10 treatments)  Will report 5/10 P at rest and 7/10 P with active movement for improved QOL. Improve NDI to < 70%    Pt. Education:  [x] Yes  [] No  [] Pain science concepts  Method of Education: [x] Verbal  [x] Demo  [] Written  Comprehension of Education:  [] Verbalizes understanding. [] Demonstrates understanding. [x] Needs review. [] Demonstrates/verbalizes HEP/Ed previously given. Plan: [x] Continue current frequency toward long and short term goals. [x] Specific Instructions for subsequent treatments: Gentle progressive, MT, ROM, neural dynamics, strengthening and cardiovascular exercise.  Pain science education      Time In: 1400        Time Out: 1435    Electronically signed by:  Nikolai Maynard, PT

## 2022-09-09 ENCOUNTER — OFFICE VISIT (OUTPATIENT)
Dept: PRIMARY CARE CLINIC | Age: 37
End: 2022-09-09
Payer: MEDICAID

## 2022-09-09 VITALS
DIASTOLIC BLOOD PRESSURE: 86 MMHG | WEIGHT: 243.8 LBS | HEART RATE: 80 BPM | OXYGEN SATURATION: 98 % | HEIGHT: 63 IN | BODY MASS INDEX: 43.2 KG/M2 | SYSTOLIC BLOOD PRESSURE: 130 MMHG

## 2022-09-09 DIAGNOSIS — K59.09 CHRONIC CONSTIPATION: ICD-10-CM

## 2022-09-09 DIAGNOSIS — Z71.3 DIETARY COUNSELING: ICD-10-CM

## 2022-09-09 DIAGNOSIS — E66.01 MORBID OBESITY DUE TO EXCESS CALORIES (HCC): Primary | ICD-10-CM

## 2022-09-09 DIAGNOSIS — R10.84 GENERALIZED ABDOMINAL PAIN: ICD-10-CM

## 2022-09-09 PROCEDURE — 99213 OFFICE O/P EST LOW 20 MIN: CPT | Performed by: PHYSICIAN ASSISTANT

## 2022-09-09 ASSESSMENT — ENCOUNTER SYMPTOMS
SHORTNESS OF BREATH: 0
VOMITING: 0
SINUS PAIN: 0
NAUSEA: 0
COUGH: 0
DIARRHEA: 0
RHINORRHEA: 0
BACK PAIN: 0

## 2022-09-09 NOTE — PROGRESS NOTES
[address] [facility] [address]  Dept: [phone]  Dept Fax: [phone]    Mario Leonardo is a 40 y.o. female who presents today for her medical conditions/complaints as noted below. Chief Complaint   Patient presents with    Other     Patient is having upper abdomen swelling and tenderness on lower left side ever since hysterectomy. Allergies have been really bad recently. Patient states her weight goes up 10 pounds from swelling. HPI:     Patient presents to the office for 1 month follow-up on weight and Adipex. She reports she is doing very well with this medication. Denies any significant side effects. Denies any palpitations, elevated heart rate, chest pains, neurological side effects. She is down 7 pounds since last office visit. She reports that her weight fluctuates based on \"swelling\". She is trying to make nutrition/lifestyle changes. Patient also concern for intermittent abdominal pain. Pain is difficult to pinpoint to one location. Denies any radiation of pain to flank region. Denies any current  symptoms. She admits to some bloating and chronic constipation. Patient states allergies are currently flared up. She is using Zyrtec and Benadryl on a daily basis. She admits to mild congestion and \"swelling\". No other concerns or complaints. BP stable.           Hemoglobin A1C (%)   Date Value   04/19/2022 5.2             ( goal A1C is < 7)   No results found for: LABMICR  LDL Cholesterol (mg/dL)   Date Value   04/19/2022 142 (H)       (goal LDL is <100)   AST (U/L)   Date Value   04/19/2022 21     ALT (U/L)   Date Value   04/19/2022 35 (H)     BUN (mg/dL)   Date Value   04/19/2022 10     BP Readings from Last 3 Encounters:   09/09/22 130/86   09/01/22 (!) 140/90   08/11/22 122/84          (goal 120/80)    Past Medical History:   Diagnosis Date    Back pain     herniated disc    Kidney infection     Kidney Never done    HIV screen  Never done    Hepatitis C screen  Never done    DTaP/Tdap/Td vaccine (1 - Tdap) Never done    Flu vaccine (1) Never done    Depression Monitoring  05/31/2023    Hepatitis A vaccine  Aged Out    Hepatitis B vaccine  Aged Out    Hib vaccine  Aged Out    Meningococcal (ACWY) vaccine  Aged Out    Pneumococcal 0-64 years Vaccine  Aged Out       Subjective:      Review of Systems   Constitutional:  Negative for chills, fatigue and fever. HENT:  Negative for congestion, rhinorrhea and sinus pain. Respiratory:  Negative for cough and shortness of breath. Cardiovascular:  Negative for chest pain and leg swelling. Gastrointestinal:  Positive for abdominal pain (intermittent) and constipation. Negative for diarrhea, nausea and vomiting. Genitourinary:  Negative for difficulty urinating, frequency and urgency. Musculoskeletal:  Negative for arthralgias, back pain and myalgias. Neurological:  Negative for dizziness and headaches. Psychiatric/Behavioral:  Negative for confusion, dysphoric mood and sleep disturbance. The patient is not nervous/anxious. All other systems reviewed and are negative. Objective:     Physical Exam  Vitals and nursing note reviewed. Constitutional:       General: She is not in acute distress. Appearance: Normal appearance. She is obese. HENT:      Head: Normocephalic. Mouth/Throat:      Mouth: Mucous membranes are moist.   Eyes:      Extraocular Movements: Extraocular movements intact. Conjunctiva/sclera: Conjunctivae normal.      Pupils: Pupils are equal, round, and reactive to light. Cardiovascular:      Rate and Rhythm: Normal rate and regular rhythm. Pulses: Normal pulses. Heart sounds: Normal heart sounds. Pulmonary:      Effort: Pulmonary effort is normal.      Breath sounds: Normal breath sounds. Abdominal:      General: Abdomen is flat. Bowel sounds are normal.      Palpations: Abdomen is soft.       Tenderness: There is no abdominal tenderness. There is no right CVA tenderness, left CVA tenderness, guarding or rebound. Musculoskeletal:      Cervical back: Normal range of motion. Right lower leg: No edema. Left lower leg: No edema. Lymphadenopathy:      Cervical: No cervical adenopathy. Skin:     General: Skin is warm. Capillary Refill: Capillary refill takes less than 2 seconds. Neurological:      General: No focal deficit present. Mental Status: She is alert and oriented to person, place, and time. Psychiatric:         Mood and Affect: Mood normal.         Behavior: Behavior normal.     /86   Pulse 80   Ht 5' 3\" (1.6 m)   Wt 243 lb 12.8 oz (110.6 kg)   LMP 09/16/2017   SpO2 98%   BMI 43.19 kg/m²     Assessment:       ICD-10-CM    1. Morbid obesity due to excess calories (HCC)  E66.01       2. Dietary counseling  Z71.3       3. Generalized abdominal pain  R10.84 Karo Herrmann MD, Gastroenterology, [place] [facility]      4. Chronic constipation  K59.09 Karo Herrmann MD, Gastroenterology, [facility]               Plan:      1,2. Patient is doing well 1 month to phentermine treatment. Tolerating without side effects. Plan to continue after discussing with Dr. Julio Colindres. We discussed the importance of making dietary changes including reducing large portions, concentrated carbohydrates, snacking. I also encouraged increasing light physical activity. 3, 4. Patient with history of chronic constipation and relapsing abdominal pain. She requests referral to GI. Return in about 26 days (around 10/5/2022) for medication recheck.     Orders Placed This Encounter   Procedures    Karo Herrmann MD, Gastroenterology, [facility]     Referral Priority:   Routine     Referral Type:   Eval and Treat     Referral Reason:   Specialty Services Required     Referred to Provider:   Judson Gallego MD     Requested Specialty:   Gastroenterology     Number of Visits Requested:   1 Patient given educational materials - see patient instructions. Discussed use, benefit, and side effects of prescribed medications. All patient questions answered. Pt voiced understanding. Reviewed health maintenance. Instructed to continue current medications, diet and exercise. Patient agreed with treatment plan. Follow up as directed.         Electronically signed by Joseph Chance PA-C on 9/12/2022 at 7:19 AM

## 2022-09-12 DIAGNOSIS — E66.01 OBESITY, MORBID, BMI 40.0-49.9 (HCC): ICD-10-CM

## 2022-09-12 RX ORDER — PHENTERMINE HYDROCHLORIDE 37.5 MG/1
37.5 TABLET ORAL
Qty: 30 TABLET | Refills: 0 | Status: SHIPPED | OUTPATIENT
Start: 2022-09-12 | End: 2022-10-07 | Stop reason: ALTCHOICE

## 2022-09-12 ASSESSMENT — ENCOUNTER SYMPTOMS
ABDOMINAL PAIN: 1
CONSTIPATION: 1

## 2022-09-12 NOTE — PROGRESS NOTES
[address]  [address] [address]  Dept: [phone]  Dept Fax: [phone]    Outpatient Followup Note    Samantha Conde is a 40y.o.-year-old female presenting for follow up of neck and left shoulder pain. HPI:     She was last seen on 6/23/22 for follow up of neck and left shoulder pain, which is ongoing at this time. She states that the cervical spine injection with pain management increased pain rather than improved it. She feels like it \"inflamed everything. \"  She states that she restarted outpatient physical therapy about 3 weeks ago, but it is now on hold due to increased pain with sessions. She continues to localize pain to the base of the neck posteriorly, over the left trapezius muscle, and to the left shoulder with radiation to the 4th and 5th digits of the left hand. She also notes ongoing tingling, weakness, and intermittent edema in the left upper limb. She states that nortriptyline helps a little bit with sleep but does not help much with pain. She reports having an EMG scheduled on 9/15/22. She also states that she needs assistance with forms for work.       Past Medical History:   Diagnosis Date    Back pain     herniated disc    Kidney infection     Kidney stone     Migraines     Ovarian cyst       Past Surgical History:   Procedure Laterality Date    DILATION AND CURETTAGE OF UTERUS      ENDOMETRIAL ABLATION      HYSTERECTOMY (CERVIX STATUS UNKNOWN)      TUBAL LIGATION       Family History   Problem Relation Age of Onset    Heart Disease Mother     Breast Cancer Mother     Ovarian Cancer Mother     Uterine Cancer Mother     Stroke Father     Heart Attack Father     Liver Disease Father     Breast Cancer Sister     Stroke Sister     Thyroid Disease Sister     Breast Cancer Maternal Aunt     Leukemia Maternal Aunt     Brain Cancer Maternal Aunt Breast Cancer Paternal Aunt     Breast Cancer Maternal Grandmother     Stroke Maternal Grandmother     Heart Attack Maternal Grandmother     Crohn's Disease Maternal Cousin     Crohn's Disease Paternal Cousin      Social History     Socioeconomic History    Marital status:      Spouse name: None    Number of children: None    Years of education: None    Highest education level: None   Tobacco Use    Smoking status: Never    Smokeless tobacco: Never    Tobacco comments:     uses vape   Vaping Use    Vaping Use: Every day    Substances: Nicotine, THC, CBD, Flavoring   Substance and Sexual Activity    Alcohol use: No    Drug use: No    Sexual activity: Yes     Partners: Male     Social Determinants of Health     Financial Resource Strain: High Risk    Difficulty of Paying Living Expenses: Very hard   Food Insecurity: Food Insecurity Present    Worried About Running Out of Food in the Last Year: Often true    Ran Out of Food in the Last Year: Often true   Transportation Needs: No Transportation Needs    Lack of Transportation (Medical): No    Lack of Transportation (Non-Medical): No       Allergies   Allergen Reactions    Latex     Imitrex [Sumatriptan] Anaphylaxis    Zoloft [Sertraline Hcl] Anaphylaxis    Zomig [Zolmitriptan] Anaphylaxis    Pcn [Penicillins] Other (See Comments)     \"Upset stomach\"       Current Outpatient Medications   Medication Sig Dispense Refill    celecoxib (CELEBREX) 200 MG capsule Celebrex 200 mg capsule   Take 1 capsule twice a day by oral route with meals for 30 days. pregabalin (LYRICA) 25 MG capsule Take 25mg at night for 1 week, followed by 25mg in the morning and 25mg at night for 1 week, followed by 25mg three times daily. 90 capsule 0    phentermine (ADIPEX-P) 37.5 MG tablet Take 1 tablet by mouth every morning (before breakfast) for 30 days.  30 tablet 0    Cyanocobalamin (VITAMIN B 12 PO) Take by mouth 2 times daily      acetaminophen (TYLENOL) 500 MG tablet Take 1,000 mg by mouth every 6 hours as needed for Pain. No current facility-administered medications for this visit. Subjective:      Review of Systems   Constitutional:  Positive for activity change. Musculoskeletal:  Positive for neck pain. +left upper limb pain   Neurological:  Positive for weakness and numbness. Psychiatric/Behavioral:  Positive for sleep disturbance. Objective:     Physical Exam  BP (!) 140/90   Pulse 84   Temp 97.6 °F (36.4 °C)   Wt 244 lb 9.6 oz (110.9 kg)   LMP 09/16/2017   BMI 43.33 kg/m²     Constitutional: She appears well-developed and well-nourished. In no distress. HEENT: NCAT, EOM grossly intact. Hearing grossly intact. Pulmonary/Chest: Respirations WNL and unlabored. MSK:  Tenderness to palpation of the left elbow. Decreased active ROM of the left shoulder due to pain. Difficult to assess manual muscle testing of the left upper limb due to pain; has weakness with left  and left finger abduction. Neurological: She is alert and oriented to person, place, and time. Sensation to light touch decreased at the lateral proximal left upper limb, medial and lateral left elbow, and 3rd and 5th digits of the left hand. Skin: Skin is warm dry and intact with good turgor. Psychiatric: She has a normal mood and affect. Her behavior is normal. Thought content normal.    Nursing note and vitals reviewed. Reviewed notes from physical therapy. Assessment:       Diagnosis Orders   1. Neuropathic pain  pregabalin (LYRICA) 25 MG capsule      2.  Left upper limb pain  pregabalin (LYRICA) 25 MG capsule           Plan:      - Discontinued nortriptyline as it has not helped much with pain  - Provided prescription for lyrica, as below, to see if this helps with pain  - She has EMG/NCS scheduled for 9/15/22  - Patient to have work forms sent to the office for review      Orders Placed This Encounter   Medications    pregabalin (LYRICA) 25 MG capsule     Sig: Take 25mg at night for 1 week, followed by 25mg in the morning and 25mg at night for 1 week, followed by 25mg three times daily. Dispense:  90 capsule     Refill:  0       Return in about 1 month (around 10/1/2022).        Electronically signed by Nigel Stafford MD on 9/11/2022 at 9:24 PM.

## 2022-09-16 DIAGNOSIS — M79.602 LEFT UPPER LIMB PAIN: Primary | ICD-10-CM

## 2022-09-16 DIAGNOSIS — G56.22 ULNAR NEUROPATHY AT ELBOW, LEFT: ICD-10-CM

## 2022-09-20 ENCOUNTER — TELEPHONE (OUTPATIENT)
Dept: ORTHOPEDIC SURGERY | Age: 37
End: 2022-09-20

## 2022-09-20 NOTE — TELEPHONE ENCOUNTER
LVM with pt notifying her that she needs to bring her EMG report with her to her OV tomorrow with Dr. Dennise Fletcher. She did upload a picture to her mychart but it is only part of one page.

## 2022-09-21 ENCOUNTER — PATIENT MESSAGE (OUTPATIENT)
Dept: ORTHOPEDIC SURGERY | Age: 37
End: 2022-09-21

## 2022-09-21 ENCOUNTER — OFFICE VISIT (OUTPATIENT)
Dept: ORTHOPEDIC SURGERY | Age: 37
End: 2022-09-21
Payer: MEDICAID

## 2022-09-21 VITALS — WEIGHT: 243 LBS | BODY MASS INDEX: 43.05 KG/M2 | HEIGHT: 63 IN | RESPIRATION RATE: 16 BRPM

## 2022-09-21 DIAGNOSIS — M25.522 LEFT ELBOW PAIN: Primary | ICD-10-CM

## 2022-09-21 PROCEDURE — 99213 OFFICE O/P EST LOW 20 MIN: CPT | Performed by: ORTHOPAEDIC SURGERY

## 2022-09-21 NOTE — TELEPHONE ENCOUNTER
Dr. Beatrice Harper,    Patient sent pictures of the 9/15 EMG report. They can be viewed under the media tab in patient's chart. Please review and advise of any additional recommendations.

## 2022-09-21 NOTE — PROGRESS NOTES
ORTHOPEDIC PATIENT EVALUATION      HPI / Chief Complaint  Sonido Eagle is a 40 y.o. right-hand-dominant female who presents for evaluation of her left elbow. She has been dealing with some paresthesias for about a year now. This all dates back to the injury that she sustained at work at [employer]. She reports having numbness and tingling fairly constant localized to the ulnar border of her forearm and the ulnar 2 digits of her hand associated with some pain and weakness. Of note she has been seen by the spine surgeon at [facility] and states that she was diagnosed with a cervical radiculopathy. She had a C6 7 injection which only made her symptoms worse. Past Medical History  Albert Rincon  has a past medical history of Back pain, Kidney infection, Kidney stone, Migraines, and Ovarian cyst.    Past Surgical History  Albert Rincon  has a past surgical history that includes Tubal ligation; Dilation and curettage of uterus; Endometrial ablation; and Hysterectomy. Current Medications  Current Outpatient Medications   Medication Sig Dispense Refill    phentermine (ADIPEX-P) 37.5 MG tablet Take 1 tablet by mouth every morning (before breakfast) for 30 days. 30 tablet 0    celecoxib (CELEBREX) 200 MG capsule Celebrex 200 mg capsule   Take 1 capsule twice a day by oral route with meals for 30 days. (Patient not taking: Reported on 9/21/2022)      pregabalin (LYRICA) 25 MG capsule Take 25mg at night for 1 week, followed by 25mg in the morning and 25mg at night for 1 week, followed by 25mg three times daily. (Patient not taking: Reported on 9/21/2022) 90 capsule 0    Cyanocobalamin (VITAMIN B 12 PO) Take by mouth 2 times daily      acetaminophen (TYLENOL) 500 MG tablet Take 1,000 mg by mouth every 6 hours as needed for Pain. No current facility-administered medications for this visit. Allergies  Allergies have been reviewed.   Albert Rincon is allergic to latex, imitrex [sumatriptan], zoloft [sertraline hcl], zomig [zolmitriptan], and pcn [penicillins]. Social History  Leni Barrett  reports that she has never smoked. She has never used smokeless tobacco. She reports that she does not drink alcohol and does not use drugs. Family History  Thania's family history includes Brain Cancer in her maternal aunt; Breast Cancer in her maternal aunt, maternal grandmother, mother, paternal aunt, and sister; Crohn's Disease in her maternal cousin and paternal cousin; Heart Attack in her father and maternal grandmother; Heart Disease in her mother; Leukemia in her maternal aunt; Liver Disease in her father; Ovarian Cancer in her mother; Stroke in her father, maternal grandmother, and sister; Thyroid Disease in her sister; Uterine Cancer in her mother. Review of Systems   History obtained from the patient. REVIEW OF SYSTEMS:   Constitution: negative for fever, chills, weight loss or malaise   Musculoskeletal: As noted in the HPI   Neurologic: As noted in the HPI    Physical Exam  Resp 16   Ht 5' 3\" (1.6 m)   Wt 243 lb (110.2 kg)   LMP 09/16/2017   BMI 43.05 kg/m²    General Appearance: alert, well appearing, and in no distress  Mental Status: alert, oriented to person, place, and time  Evaluation of patient's left elbow and upper extremity demonstrates intact skin without warmth, erythema, or notable swelling. Sensation is altered to light touch grossly in the ulnar nerve distribution of her hand. She has a 2+ radial pulse with brisk cap refill in all fingers. She can actively flex, extend, abduct and adduct all of her fingers. She has a positive Tinel's at the cubital tunnel. She has good range of motion through the elbow and no gross instability. Imaging Studies  3 x-ray views of the left elbow completed on 9/21/2022 were reviewed independently demonstrating a normal joint space without obvious fracture, dislocation, subluxation or notable osseous abnormality.     Assessment and Plan  Marcus Khoruy is a 40 y.o. old female with left hand paresthesias that are suggestive are consistent with cubital tunnel syndrome. She recently had electrodiagnostic testing completed at [facility]. 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 I had a discussion with the patient educating her about this condition and discussed treatment options available to her including nonoperative and operative intervention. I recommended proceeding conservatively at this time with nighttime splinting of this elbow. She was educated on how to accomplish this. I will see her back for reevaluation in 6 to 8 weeks. Should she have persistent symptoms we discussed the possibility of moving ahead with surgical intervention at that time. This note is created with the assistance of a speech recognition program.  While intending to generate adocument that actually reflects the content of the visit, the document can still have some errors including those of syntax and sound a like substitutions which may escape proof reading. It such instances, actual meaningcan be extrapolated by contextual diversion.

## 2022-09-21 NOTE — TELEPHONE ENCOUNTER
From: Keo Maxwell  To: Dr. Drake Mera: 9/21/2022 11:10 AM EDT  Subject: Emg results    Attached is the full emg results. I spoke with my spouse and he thinks going for the splint would be better for me as discussed at my appointment. I will be in [place] tomorrow morning if that is possible.

## 2022-09-21 NOTE — TELEPHONE ENCOUNTER
Speaking with patient would be easier than communicating through [facility]. Called and spoke with patient. Informed her that I would send Dr. Kerry Davis a message making him aware that her EMG can now be viewed in her chart. Patient was notified that she will be notified if Dr. Kerry Davis has any additional recommendations after reviewing all of her EMG report. Patient also verified that she would like to be fitted with a hinged elbow brace. Per Dr. Kerry Davis, patient should wear brace while sleeping in near full elbow extension. Patient was told that no one would be available from ortho to fit her in [place] tomorrow morning. Marta Dave will be available tomorrow afternoon in [place] to fit brace. Patient was notified that she will be seen in between other patients and that she may have to wait for Hank to have a free minute. She was instructed to come into [place] office any time between 1pm-4:30pm. She was told to call the office to coordinate another date/time to be fitted for brace if tomorrow afternoon in [place] does not work for her. Patient voiced understanding. [facility]. Please bring one jessica [facility] with you tomorrow when you leave for [place] office.

## 2022-09-26 ENCOUNTER — TELEPHONE (OUTPATIENT)
Dept: PHYSICAL MEDICINE AND REHAB | Age: 37
End: 2022-09-26

## 2022-09-26 ENCOUNTER — TELEPHONE (OUTPATIENT)
Dept: PRIMARY CARE CLINIC | Age: 37
End: 2022-09-26

## 2022-09-26 ENCOUNTER — TELEPHONE (OUTPATIENT)
Dept: ORTHOPEDIC SURGERY | Age: 37
End: 2022-09-26

## 2022-09-26 NOTE — TELEPHONE ENCOUNTER
I would recommend they discuss disability with her specialists PMNR or Pain management. I did not advise patient on disability.     Angela Marquez

## 2022-09-26 NOTE — TELEPHONE ENCOUNTER
Espinoza from Dr. Barbara Tavarez office called with a [id number] request regarding patient's short term disability claim. If the PA is supporting a disability then a call back  is requested,, but if not, a confirmation is sufficient so they can proceed on their own.

## 2022-09-26 NOTE — TELEPHONE ENCOUNTER
Boy De La Torre w/ Dr Cisse [place] office called, he is pain management specialist reviewing this patient's STD claim. Dr Amelia Oviedo would like a ryvu-[id number] regarding the claim. If Dr Patrice Sanchez is supporting this claim and call is requested. If Dr Patrice Sanchez is not supporting this claim, confirmation from the office, specifying that, please contact the office.     Boy De La Torre can be reached is [phone]

## 2022-09-26 NOTE — TELEPHONE ENCOUNTER
Received a call from a woman at Dr. Luevenia Epley office (I did not catch her name) - she called to let us know that Dr. Adele Cheema is reviewing Thania's recent short-term disability claim. They do have records for review, but she stated that if you support this short-term disability and would like to set up a [id number], you can do so by calling [phone]. This number is also their direct line so if there is any other information needed to clarify, we can call them back at that number. Dr. Adele Cheema will be available after Wednesday this week per the woman I spoke to.

## 2022-09-27 NOTE — TELEPHONE ENCOUNTER
LVM notifying them that Dr. Frieda Murphy released the pt to return to work on 6/23/22 and she has not been on any restrictions since there from our office. They were advised to call the office back with any additional questions.

## 2022-09-28 ENCOUNTER — TELEPHONE (OUTPATIENT)
Dept: PHYSICAL MEDICINE AND REHAB | Age: 37
End: 2022-09-28

## 2022-09-28 NOTE — TELEPHONE ENCOUNTER
Received a call today, Patient has filed for short term disability. If you have any input that can help the patient a peer to peer can be set up with Dr. Marta Ivey.    [phone]   This is optional.

## 2022-10-07 ENCOUNTER — OFFICE VISIT (OUTPATIENT)
Dept: PRIMARY CARE CLINIC | Age: 37
End: 2022-10-07
Payer: MEDICAID

## 2022-10-07 VITALS
OXYGEN SATURATION: 95 % | SYSTOLIC BLOOD PRESSURE: 124 MMHG | BODY MASS INDEX: 42.49 KG/M2 | HEART RATE: 92 BPM | WEIGHT: 239.8 LBS | HEIGHT: 63 IN | RESPIRATION RATE: 16 BRPM | DIASTOLIC BLOOD PRESSURE: 84 MMHG

## 2022-10-07 DIAGNOSIS — F31.81 BIPOLAR 2 DISORDER (HCC): ICD-10-CM

## 2022-10-07 DIAGNOSIS — G43.709 CHRONIC MIGRAINE WITHOUT AURA WITHOUT STATUS MIGRAINOSUS, NOT INTRACTABLE: Primary | ICD-10-CM

## 2022-10-07 DIAGNOSIS — E66.01 MORBID OBESITY DUE TO EXCESS CALORIES (HCC): ICD-10-CM

## 2022-10-07 DIAGNOSIS — E88.81 INSULIN RESISTANCE: ICD-10-CM

## 2022-10-07 PROBLEM — G56.22 ULNAR NERVE COMPRESSION, LEFT: Status: ACTIVE | Noted: 2022-09-15

## 2022-10-07 PROBLEM — E55.9 VITAMIN D DEFICIENCY, UNSPECIFIED: Status: ACTIVE | Noted: 2022-07-25

## 2022-10-07 PROBLEM — M25.50 PAIN IN UNSPECIFIED JOINT: Status: ACTIVE | Noted: 2022-07-25

## 2022-10-07 PROCEDURE — 99214 OFFICE O/P EST MOD 30 MIN: CPT | Performed by: PHYSICIAN ASSISTANT

## 2022-10-07 RX ORDER — UBROGEPANT 100 MG/1
100 TABLET ORAL
Qty: 16 TABLET | Refills: 0 | Status: SHIPPED | OUTPATIENT
Start: 2022-10-07 | End: 2022-10-07

## 2022-10-07 ASSESSMENT — PATIENT HEALTH QUESTIONNAIRE - PHQ9
SUM OF ALL RESPONSES TO PHQ9 QUESTIONS 1 & 2: 2
9. THOUGHTS THAT YOU WOULD BE BETTER OFF DEAD, OR OF HURTING YOURSELF: 0
SUM OF ALL RESPONSES TO PHQ QUESTIONS 1-9: 2
4. FEELING TIRED OR HAVING LITTLE ENERGY: 0
3. TROUBLE FALLING OR STAYING ASLEEP: 0
SUM OF ALL RESPONSES TO PHQ9 QUESTIONS 1 & 2: 2
1. LITTLE INTEREST OR PLEASURE IN DOING THINGS: 1
SUM OF ALL RESPONSES TO PHQ QUESTIONS 1-9: 2
8. MOVING OR SPEAKING SO SLOWLY THAT OTHER PEOPLE COULD HAVE NOTICED. OR THE OPPOSITE, BEING SO FIGETY OR RESTLESS THAT YOU HAVE BEEN MOVING AROUND A LOT MORE THAN USUAL: 0
2. FEELING DOWN, DEPRESSED OR HOPELESS: 1
1. LITTLE INTEREST OR PLEASURE IN DOING THINGS: 1
SUM OF ALL RESPONSES TO PHQ QUESTIONS 1-9: 2
5. POOR APPETITE OR OVEREATING: 0
6. FEELING BAD ABOUT YOURSELF - OR THAT YOU ARE A FAILURE OR HAVE LET YOURSELF OR YOUR FAMILY DOWN: 1
SUM OF ALL RESPONSES TO PHQ QUESTIONS 1-9: 4
SUM OF ALL RESPONSES TO PHQ QUESTIONS 1-9: 4
SUM OF ALL RESPONSES TO PHQ QUESTIONS 1-9: 2
7. TROUBLE CONCENTRATING ON THINGS, SUCH AS READING THE NEWSPAPER OR WATCHING TELEVISION: 1
SUM OF ALL RESPONSES TO PHQ QUESTIONS 1-9: 4
2. FEELING DOWN, DEPRESSED OR HOPELESS: 1
SUM OF ALL RESPONSES TO PHQ QUESTIONS 1-9: 4
10. IF YOU CHECKED OFF ANY PROBLEMS, HOW DIFFICULT HAVE THESE PROBLEMS MADE IT FOR YOU TO DO YOUR WORK, TAKE CARE OF THINGS AT HOME, OR GET ALONG WITH OTHER PEOPLE: 1

## 2022-10-07 NOTE — PROGRESS NOTES
[address] [facility] [address] [id number]  Dept: [phone]  Dept Fax: [phone]    Cristine Mishra is a 40 y.o. female who presents today for her medical conditions/complaints as noted below. Chief Complaint   Patient presents with    Weight Management     Adipex check     Other     Diagnosed with Bipolar 2 with psychiatry, discuss medication for insulin resistance, discuss referral for neurology for migraines that cause her to pass out       HPI:     Patient presents the office for medication/weight recheck. Patient is completed second month of Adipex. She is down 4 pounds. She reports that she does not feel this medication is effective. Denies any significant side effects. She reports she was recently diagnosed with bipolar 2 disorder yesterday. She is following with [place] psychiatry. She has not started any medication as she wanted to let me know first.  She has close follow-up with [facility] psychiatry. Also, patient is planning to have left elbow surgery for ulnar nerve entrapment. She recently had an EMG which showed chronic C6 radiculopathy and ulnar nerve entrapment. [facility]. Patient would like to discuss migraines and referral to neurology. She has history of chronic migraines with auras. She states when migraine is severe she will get lightheadedness and nausea. She has tried triptans in the past with side effects. No other complaints or concerns.   BP stable      Hemoglobin A1C (%)   Date Value   04/19/2022 5.2             ( goal A1C is < 7)   No results found for: LABMICR  LDL Cholesterol (mg/dL)   Date Value   04/19/2022 142 (H)       (goal LDL is <100)   AST (U/L)   Date Value   04/19/2022 21     ALT (U/L)   Date Value   04/19/2022 35 (H)     BUN (mg/dL)   Date Value   04/19/2022 10     BP Readings from Last 3 Encounters:   10/07/22 124/84   09/09/22 130/86   09/01/22 (!) 140/90          (goal 120/80)    Past Medical History:   Diagnosis Date    Back pain     herniated disc    Kidney infection     Kidney stone     Migraines     Ovarian cyst       Past Surgical History:   Procedure Laterality Date    DILATION AND CURETTAGE OF UTERUS      ENDOMETRIAL ABLATION      HYSTERECTOMY (CERVIX STATUS UNKNOWN)      TUBAL LIGATION         Family History   Problem Relation Age of Onset    Heart Disease Mother     Breast Cancer Mother     Ovarian Cancer Mother     Uterine Cancer Mother     Stroke Father     Heart Attack Father     Liver Disease Father     Breast Cancer Sister     Stroke Sister     Thyroid Disease Sister     Breast Cancer Maternal Aunt     Leukemia Maternal Aunt     Brain Cancer Maternal Aunt     Breast Cancer Paternal Aunt     Breast Cancer Maternal Grandmother     Stroke Maternal Grandmother     Heart Attack Maternal Grandmother     Crohn's Disease Maternal Cousin     Crohn's Disease Paternal Cousin        Social History     Tobacco Use    Smoking status: Never    Smokeless tobacco: Never    Tobacco comments:     uses vape   Substance Use Topics    Alcohol use: No      Current Outpatient Medications   Medication Sig Dispense Refill    metFORMIN (GLUCOPHAGE) 500 MG tablet Take 1 tablet by mouth daily (with breakfast) 30 tablet 0     No current facility-administered medications for this visit.      Allergies   Allergen Reactions    Latex     Imitrex [Sumatriptan] Anaphylaxis    Zoloft [Sertraline Hcl] Anaphylaxis    Zomig [Zolmitriptan] Anaphylaxis    Pcn [Penicillins] Other (See Comments)     \"Upset stomach\"       Health Maintenance   Topic Date Due    COVID-19 Vaccine (1) Never done    Varicella vaccine (1 of 2 - 2-dose childhood series) Never done    HIV screen  Never done    Hepatitis C screen  Never done    DTaP/Tdap/Td vaccine (1 - Tdap) Never done    Cervical cancer screen  Never done    Flu vaccine (1) 10/07/2023 (Originally 8/1/2022)    Depression Monitoring  10/07/2023    Hepatitis A vaccine Aged Out    Hib vaccine  Aged Out    Meningococcal (ACWY) vaccine  Aged Out    Pneumococcal 0-64 years Vaccine  Aged Out       Subjective:      Review of Systems   Constitutional:  Negative for chills, fatigue and fever. HENT:  Negative for congestion, rhinorrhea and sinus pain. Respiratory:  Negative for cough and shortness of breath. Cardiovascular:  Negative for chest pain and leg swelling. Gastrointestinal:  Negative for abdominal pain, constipation, diarrhea, nausea and vomiting. Genitourinary:  Negative for difficulty urinating, frequency and urgency. Musculoskeletal:  Negative for arthralgias, back pain and myalgias. Neurological:  Positive for headaches. Negative for dizziness. Psychiatric/Behavioral:  Positive for dysphoric mood. Negative for confusion and sleep disturbance. The patient is not nervous/anxious. All other systems reviewed and are negative. Objective:     Physical Exam  Vitals and nursing note reviewed. Constitutional:       General: She is not in acute distress. Appearance: Normal appearance. She is obese. HENT:      Head: Normocephalic. Mouth/Throat:      Mouth: Mucous membranes are moist.   Eyes:      Extraocular Movements: Extraocular movements intact. Conjunctiva/sclera: Conjunctivae normal.      Pupils: Pupils are equal, round, and reactive to light. Cardiovascular:      Rate and Rhythm: Normal rate and regular rhythm. Pulses: Normal pulses. Heart sounds: Normal heart sounds. Pulmonary:      Effort: Pulmonary effort is normal. No respiratory distress. Breath sounds: Normal breath sounds. Musculoskeletal:      Cervical back: Normal range of motion. Right lower leg: No edema. Left lower leg: No edema. Lymphadenopathy:      Cervical: No cervical adenopathy. Skin:     General: Skin is warm. Capillary Refill: Capillary refill takes less than 2 seconds. Neurological:      General: No focal deficit present. Mental Status: She is alert and oriented to person, place, and time. Psychiatric:         Mood and Affect: Mood normal.         Behavior: Behavior normal.     /84 (Site: Left Upper Arm, Position: Sitting, Cuff Size: Large Adult)   Pulse 92   Resp 16   Ht 5' 3\" (1.6 m)   Wt 239 lb 12.8 oz (108.8 kg)   LMP 09/16/2017   SpO2 95%   BMI 42.48 kg/m²     Assessment:       ICD-10-CM    1. Chronic migraine without aura without status migrainosus, not intractable  G43.709 Gonzalo Guzmán MD, Neurology, [place]     Ubrogepant (UBRELVY) 100 MG TABS      2. Insulin resistance  E88.81 metFORMIN (GLUCOPHAGE) 500 MG tablet      3. Bipolar 2 disorder ([address]  F31.81       4. Morbid obesity due to excess calories ([facility] [address]  E66.01                Plan:      1. Patient with chronic migraines. She is given prescription of Ubrelvy for abortive therapy. I discussed instructions and side effects. Patient also given referral to neurology for further evaluation and management. 2.  Patient with history of insulin resistance. Plan to start low-dose metformin. Discussed instructions side effects. 3.  Patient to continue following with psychiatry for management of bipolar. 4.  Had a long discussion with patient about obesity. With recent diagnosis of bipolar and ineffectiveness of Adipex, I recommend discontinuation. She is agreeable. I stressed the importance of monitoring diet for carbohydrates, sugars, large portions. Return in about 3 months (around 1/7/2023) for medication recheck and weight recheck. Orders Placed This Encounter   Procedures    Gonzalo Guzmán MD, Neurology, [place]     Referral Priority:   Routine     Referral Type:   Eval and Treat     Referral Reason:   Specialty Services Required     Referred to Provider:   Jarvis Ibrahim MD     Requested Specialty:   Neurology     Number of Visits Requested:   1         Patient given educational materials - see patient instructions.   Discussed use, benefit, and side effects of prescribedmedications. All patient questions answered. Pt voiced understanding. Reviewed health maintenance. Instructed to continue current medications, diet and exercise. Patient agreed with treatment plan. Follow up as directed.         Electronically signed by Maria Guadalupe Lr PA-C on 10/11/2022 at 7:24 AM

## 2022-10-11 PROBLEM — G43.709 CHRONIC MIGRAINE WITHOUT AURA WITHOUT STATUS MIGRAINOSUS, NOT INTRACTABLE: Status: ACTIVE | Noted: 2022-10-11

## 2022-10-11 PROBLEM — F31.81 BIPOLAR 2 DISORDER (HCC): Status: ACTIVE | Noted: 2022-10-11

## 2022-10-11 ASSESSMENT — ENCOUNTER SYMPTOMS
BACK PAIN: 0
DIARRHEA: 0
COUGH: 0
RHINORRHEA: 0
SINUS PAIN: 0
NAUSEA: 0
VOMITING: 0
SHORTNESS OF BREATH: 0
ABDOMINAL PAIN: 0
CONSTIPATION: 0

## 2022-10-12 ENCOUNTER — TELEPHONE (OUTPATIENT)
Dept: PRIMARY CARE CLINIC | Age: 37
End: 2022-10-12

## 2022-10-12 NOTE — TELEPHONE ENCOUNTER
I called Hemanth Ward. We discussed patient's case. Patient will follow closely with Hemanth Ward for management of psychiatric medications.

## 2022-10-12 NOTE — TELEPHONE ENCOUNTER
Kemal 38 called asking to speak with the patient's PCP before she would be prescribing any medications for the the patient. She will be free at 9:30am, 11:30am, and 12:30pm today to further discuss.

## 2022-10-19 ENCOUNTER — OFFICE VISIT (OUTPATIENT)
Dept: PHYSICAL MEDICINE AND REHAB | Age: 37
End: 2022-10-19
Payer: MEDICAID

## 2022-10-19 VITALS
WEIGHT: 248 LBS | HEART RATE: 87 BPM | SYSTOLIC BLOOD PRESSURE: 130 MMHG | DIASTOLIC BLOOD PRESSURE: 82 MMHG | HEIGHT: 63 IN | TEMPERATURE: 99 F | BODY MASS INDEX: 43.94 KG/M2

## 2022-10-19 DIAGNOSIS — G56.22 ULNAR NEUROPATHY AT ELBOW, LEFT: Primary | ICD-10-CM

## 2022-10-19 PROCEDURE — 99214 OFFICE O/P EST MOD 30 MIN: CPT | Performed by: STUDENT IN AN ORGANIZED HEALTH CARE EDUCATION/TRAINING PROGRAM

## 2022-10-19 RX ORDER — HYDROCODONE BITARTRATE AND ACETAMINOPHEN 5; 325 MG/1; MG/1
1 TABLET ORAL EVERY 6 HOURS PRN
COMMUNITY
Start: 2022-10-17 | End: 2022-10-22

## 2022-10-19 RX ORDER — IBUPROFEN 600 MG/1
600 TABLET ORAL EVERY 6 HOURS
COMMUNITY
Start: 2022-10-17

## 2022-10-19 RX ORDER — CYCLOBENZAPRINE HCL 10 MG
10 TABLET ORAL NIGHTLY PRN
Qty: 10 TABLET | Refills: 0 | Status: SHIPPED | OUTPATIENT
Start: 2022-10-19 | End: 2022-10-29

## 2022-11-06 NOTE — PROGRESS NOTES
[facility],[address] PHYSICAL MEDICINE AND REHABILITATION  [address] [place] [id number]  Dept: [phone]  Dept Fax: [phone]    Outpatient Followup Note    Deacon Laird is a 40y.o.-year-old female presenting for follow up of left upper limb pain. HPI:     She was last seen on 9/1/22 for follow up of neck and left shoulder pain. 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 She was subsequently evaluated by Dr. Val Vega and underwent left ulnar nerve decompression on 10/17/22. She reports still feeling somewhat numb in the left upper limb with the 4th and 5th digits feeling the same as prior to the surgery. She also notes ongoing left neck and shoulder pain. She states that she had a lot of swelling in the left upper limb on the day of the surgery. She states that she did not try the lyrica prescribed at the last visit due to wanting a break from non-essential medications. She was prescribed norco and motrin as needed after surgery. She is asking about a muscle relaxer to help with pain and spasms in the left upper limb.       Past Medical History:   Diagnosis Date    Back pain     herniated disc    Kidney infection     Kidney stone     Migraines     Ovarian cyst       Past Surgical History:   Procedure Laterality Date    DILATION AND CURETTAGE OF UTERUS      ENDOMETRIAL ABLATION      HYSTERECTOMY (CERVIX STATUS UNKNOWN)      TUBAL LIGATION       Family History   Problem Relation Age of Onset    Heart Disease Mother     Breast Cancer Mother     Ovarian Cancer Mother     Uterine Cancer Mother     Stroke Father     Heart Attack Father     Liver Disease Father     Breast Cancer Sister     Stroke Sister     Thyroid Disease Sister     Breast Cancer Maternal Aunt     Leukemia Maternal Aunt     Brain Cancer Maternal Aunt     Breast Cancer Paternal Aunt     Breast Cancer Maternal Grandmother     Stroke Maternal Grandmother     Heart Attack Maternal Grandmother     Crohn's Disease Maternal Cousin     Crohn's Disease Paternal Cousin      Social History     Socioeconomic History    Marital status:    Tobacco Use    Smoking status: Never    Smokeless tobacco: Never    Tobacco comments:     uses vape   Vaping Use    Vaping Use: Every day    Substances: Nicotine, THC, CBD, Flavoring   Substance and Sexual Activity    Alcohol use: No    Drug use: No    Sexual activity: Yes     Partners: Male     Social Determinants of Health     Financial Resource Strain: High Risk    Difficulty of Paying Living Expenses: Very hard   Food Insecurity: Food Insecurity Present    Worried About Running Out of Food in the Last Year: Often true    Ran Out of Food in the Last Year: Often true   Transportation Needs: No Transportation Needs    Lack of Transportation (Medical): No    Lack of Transportation (Non-Medical): No       Allergies   Allergen Reactions    Latex     Imitrex [Sumatriptan] Anaphylaxis    Zoloft [Sertraline Hcl] Anaphylaxis    Zomig [Zolmitriptan] Anaphylaxis    Pcn [Penicillins] Other (See Comments)     \"Upset stomach\"       Current Outpatient Medications   Medication Sig Dispense Refill    cariprazine hcl (VRAYLAR) 1.5 MG capsule Take 1.5 mg by mouth daily      ibuprofen (ADVIL;MOTRIN) 600 MG tablet Take 600 mg by mouth in the morning and 600 mg at noon and 600 mg in the evening and 600 mg before bedtime. metFORMIN (GLUCOPHAGE) 500 MG tablet Take 1 tablet by mouth daily (with breakfast) (Patient not taking: Reported on 10/19/2022) 30 tablet 0     No current facility-administered medications for this visit. Subjective:      Review of Systems   Musculoskeletal:  Positive for neck pain. Neurological:  Positive for numbness.      Objective:     Physical Exam  /82   Pulse 87   Temp 99 °F (37.2 °C)   Ht 5' 3\" (1.6 m) Wt 248 lb (112.5 kg)   LMP 09/16/2017   BMI 43.93 kg/m²     Constitutional: She appears well-developed and well-nourished. In no distress. HEENT: NCAT, EOM grossly intact. Hearing grossly intact. Pulmonary/Chest: Respirations WNL and unlabored. MSK:  Left upper limb sling in place. Edema present in the left fingers. Minimal movement noted in the left hand at the time of evaluation. Neurological: She is alert and oriented to person, place, and time. Decreased sensation diffusely in the left upper limb. Skin: Skin is warm dry and intact with good turgor. Psychiatric: She has a normal mood and affect. Her behavior is normal. Thought content normal.    Nursing note and vitals reviewed. Reviewed notes from orthopedic surgery. 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 Ulnar neuropathy at elbow, left             Plan:      - Provided short-term prescription for flexeril, as below, to see if this helps with pain and muscle spasms in the left upper limb  - Patient reports that she gave Dr. Martin So paperwork to fill out regarding work restrictions/disability - discussed that due to recent surgery, he will be managing any activity restrictions that she may have at this time. Encouraged her to follow up with his office for the paperwork. Discussed that if she needs additional assistance with return to work/restrictions in the future, this can be addressed at a future appointment.   - She has some questions regarding wound care after surgery - encouraged her to call Dr. Jadyn Ni office for clarification      Orders Placed This Encounter   Medications    cyclobenzaprine (FLEXERIL) 10 MG tablet     Sig: Take 1 tablet by mouth nightly as needed for Muscle spasms     Dispense:  10 tablet     Refill:  0 Return in about 6 weeks (around 11/30/2022).        Electronically signed by Stella Gimenez MD on 11/6/2022 at 4:18 PM.

## 2022-12-07 ENCOUNTER — HOSPITAL ENCOUNTER (OUTPATIENT)
Dept: PHYSICAL THERAPY | Facility: CLINIC | Age: 37
Setting detail: THERAPIES SERIES
Discharge: HOME | End: 2022-12-07
Payer: MEDICAID

## 2022-12-07 PROCEDURE — 97161 PT EVAL LOW COMPLEX 20 MIN: CPT

## 2022-12-07 PROCEDURE — 97110 THERAPEUTIC EXERCISES: CPT

## 2022-12-07 NOTE — CONSULTS
[] [address]  [address]  [phone]  F: [phone] [] [address]  [facility] 36   [address]  P: [phone]  F: [phone] [] [address]  [address] [facility]  P: [phone]  F: [phone] [] [address]  [facility]   [address]   [place]: [phone]  F: [phone]  [x] [address]: [phone]  F: [phone]      Physical Therapy Upper Extremity Evaluation    Date:  2022  Patient: Lynder Cranker  :  1985  MRN: [id number]  Physician: Dr. Ash Amanda: Medicaid (24 visits left this year)  Medical Diagnosis: Chronic L shoulder pain   Rehab Codes:  M25. 512   Onset date: 10/17/22  Next 's appt.:  TBA pending PT    Subjective:   CC: Pt arrives after surgery (see below), L shoulder pain is the \"same\", worst with movement. Continues to have N/T in L hand however not as frequent as prior to surgery. Tingling is 4th and 5th digit. Margot Bardales is a 40y.o. year old female with status post left ulnar nerve decompression in situ (DOS 10/17/22). Improving sensation to ring and fifth fingers. Will gradually increase her lifting activities and have her work with PT for her impingement.      -C9 for 6 weeks of PT  -10lb weight limit work restrictions with no push, no pull, no overhead activity  -RTC in 6 weeks    HPI:     PMHx: [] Unremarkable [] Diabetes [] HTN  [] Pacemaker   [] MI/Heart Problems [] Cancer [] Arthritis [] Other:              [x] Refer to full medical chart  In EPIC       Tests: [] X-Ray: [] MRI:  [] Other:    Medications: [x] Refer to full medical record [] None [] Other:  Allergies:      [x] Refer to full medical record [] None [] Other:        Marital Status Home type    Stairs from outside            [address] inside            [employer] rail    Employment [employer]- has been off since April, unable to accommodate work restrictions   Job status Off d/t condition   Work Activities/duties  Reaching overhead-    Recreational Activities        Pain present? Yes   Location L post shoulder, L elbow   Pain Rating currently 8/10   Pain at worse 10/10   Pain at best 6/10   Description of pain Constant pain in L shoulder   Altered Sensation N/T intermittment to 4th and 5th digit    What makes it worse    What makes it better Heat   Symptom progression Improved slightly since surgery    Sleep Wakes up with pain at times              Objective:     ROM  °A/P STRENGTH    Left Right Left Right   Shld Flex 85/95  3-/5 4+/5   Shld Abd 55/80  3-/5 4+/5   Shld IR 90      Shld ER 40      Shld Ext                            Supraspinatus       Infraspinatus       Serratus Ant       Pectoralis       Lats       Mid Trap       Lower Trap       Elbow Flex. 120  3-/5 4+/5   Elbow Ext. 15  3-/5 4+/5   Pronation       Supination       Wrist Flex. Wrist Ext. Rad. Dev. Ulnar Dev.                   OBSERVATION No Deficit Deficit Not Tested Comments   Forward Head [] [] []    Rounded Shoulders [] [] []    Kyphosis [] [] []    Scap Height/Position [] [] []    Winging [] [] []    SH Rhythm [] [] []    INSPECTION/PALPATION    TTP posterior elbow, anterior shoulder   SC/AC Joint [] [] []    Supraspinatus [] [] []    Biceps tendon/groove [] [] []    Posterior shld [] [] []    Subscapularis [] [] []    NEUROLOGICAL       Cervical ROM/Quadrant [] [] []    Reflexes [] [] []    Compression/Distraction [] [] []    Sensation [] [] []                       Functional Test: Neck Disability Score: 78% functionally impaired       Assessment:   Patient presents with signs and symptoms consistent with post op ulnar nerve decompression, such as intermittment N/T to L 4th and 5th digits, along with limited L shoulder ROM. Patient would benefit from skilled physical therapy services in order to: Increase L shoulder and elbow ROM, progressing to strengthening once tolerated. Pt would also benefit from ulnar nerve glides to decrease tension along nerves. Goals:        STG: (to be met in 10 treatments)  ? Pain: Decrease pain levels to 4/10 at worst  ? ROM: Increase flexibility and AROM limitations throughout to equal bilat to reduce difficulty with ADLs, increase L shoulder AROM flex from 85deg to 120deg and abd from 55deg to 100deg  ? Strength: Increase L shoulder flex and abd strength to 4/5  ? Function: Pt to report performing daily tasks without any increase in pain  Independent with Home Exercise Programs      LTG: (to be met in 20 treatments)  Improve score on assessment tool from 78% impaired to 35% impaired demonstrating an improvement in ADLs  Reduce pain levels to 0/10                   Patient goals: Increase motion and decrease pain     Rehab Potential:  [x] Good  [] Fair  [] Poor   Suggested Professional Referral:  [x] No  [] Yes:  Barriers to Goal Achievement[de-identified]  [x] No  [] Yes:  Domestic Concerns:  [x] No  [] Yes:    Pt. Education:  [x] Plans/Goals, Risks/Benefits discussed  [x] Home exercise program    Method of Education: [x] Verbal  [x] Demo  [x] Written  Comprehension of Education:  [x] Verbalizes understanding. [x] Demonstrates understanding. [x] Needs Review. [] Demonstrates/verbalizes understanding of HEP/Ed previously given.     Treatment Plan:  [x] Therapeutic Exercise (17452 )             [] Aquatic Therapy (13847)  [x] Manual Therapy (68348)              [] Electrical Stimulation- Unattended (38820)  [x] Integrative Dry Needling                                      [] Electrical Stimulation- Attended (68900)  [x] Instruction in HEP                             [] Lumbar/Cervical Traction (78789)  [] Neuromuscular Re-education (76029)            [x] Cold/hotpack    [x] Vasocompression (71974)                                   [] Ultrasound (74707)                      [x] Gait Training (48913)                                                          [] Therapeutic Activity (38151)               [] Iontophoresis (41370): 4 mg/mL Dexamethasone Sodium Phosphate 40-80 mAmin            []  Medication allergies reviewed for use of    Dexamethasone Sodium Phosphate 4mg/ml     with iontophoresis treatments. Pt is not allergic. Frequency:  2 x/week for 20 visits    Todays Treatment:    Exercises:  Exercise  S/p L ulnar nerve decompression   Reps/ Time Weight/ Level Comments   PROM L shoulder and elbow x           Gentle ulnar nerve glides x     Seated shoulder flex and abd slides 2x10     Seated elbow flex/ext 2x10                                                                 Other:    Specific Instructions for next treatment: Continue with gentle motion to L shoulder and elbow    Evaluation Complexity:  History (Personal factors, comorbidities) [x] 0 [] 1-2 [] 3+   Exam (limitations, restrictions) [x] 1-2 [] 3 [] 4+   Clinical presentation (progression) [x] Stable [] Evolving  [] Unstable   Decision Making [x] Low [] Moderate [] High    [x] Low Complexity [] Moderate Complexity [] High Complexity       Treatment Charges: Mins Units   [x] Evaluation       [x]  Low       []  Moderate       []  High 20 1   []  Modalities     [x]  Ther Exercise 20 1   []  Manual Therapy     []  Ther Activities     []  Aquatics     []  Vasocompression     []  Other       TOTAL TREATMENT TIME: 40 minutes    Time in: 1510   Time Out: 1550    Electronically signed by: Cammei Roberts PT        Physician Signature:________________________________Date:__________________  By signing above or cosigning this note, I have reviewed this plan of care and certify a need for medically necessary rehabilitation services.      *PLEASE SIGN ABOVE AND FAX BACK ALL PAGES*

## 2022-12-15 ENCOUNTER — HOSPITAL ENCOUNTER (OUTPATIENT)
Dept: PHYSICAL THERAPY | Facility: CLINIC | Age: 37
Setting detail: THERAPIES SERIES
Discharge: HOME | End: 2022-12-15
Payer: MEDICAID

## 2022-12-15 NOTE — FLOWSHEET NOTE
[] [address]  [address]    [phone]  F: [phone]   [] [address]  [facility] 36   [address]  P: [phone]  F: [phone]  [] [address]  [address] [facility]  P: [phone]  F: [phone] [x] [address]  P: [phone]  F: [phone]  [] [address]  [facility]   [address]   [place]: [phone]  F: [phone]   [] [address]  [place]: [phone]   F: [phone]     Physical Therapy Cancel/No Show note    Date: 12/15/2022  Patient: Nadia Armstrong  : 1985  MRN: [id number]    Cancels/No Shows to date:     For today's appointment patient:    [x]  Cancelled    [] Rescheduled appointment    [] No-show     Reason given by patient:    []  Patient ill    []  Conflicting appointment    [] No transportation      [] Conflict with work    [] No reason given    [] Weather related    [] COVID-19    [x] Other:      Comments:  Pt's  has to work overtime. Next appt conf.       [x] Next appointment was confirmed    Electronically signed by: Hamida Barton

## 2022-12-19 ENCOUNTER — HOSPITAL ENCOUNTER (OUTPATIENT)
Dept: PHYSICAL THERAPY | Facility: CLINIC | Age: 37
Setting detail: THERAPIES SERIES
Discharge: HOME | End: 2022-12-19
Payer: MEDICAID

## 2022-12-19 PROCEDURE — 97110 THERAPEUTIC EXERCISES: CPT

## 2022-12-19 PROCEDURE — 97140 MANUAL THERAPY 1/> REGIONS: CPT

## 2022-12-19 NOTE — FLOWSHEET NOTE
[] [facility]) [facility]  Therapy  [address]  [phone]  F: [phone] [] [address]  [place] [address]   [address]  P: [phone]  F: [phone] [] [place]  [facility]  [address]  P: [phone]  F: [phone] [x] [address]  P: [phone]  F: [phone] [] [address]  [facility]   [address]   [place]: [phone]  F: [phone]      Physical Therapy Daily Treatment Note    Date:  2022  Patient Name:  Lovell Hammans    :  1985  MRN: [id number]  Physician: Dr. Miner Notice: Medicaid (24 visits left this year)  Medical Diagnosis: Chronic L shoulder pain                       Rehab Codes:  M25. 512   Onset date: 10/17/22             Next 's appt.:  TBA pending PT  Visit# / total visits:     Cancels/No Shows: 1/0    Subjective:    Pain:  [] Yes  [] No Location: L shoulder  Pain Rating: (0-10 scale) 7/10  Pain altered Tx:  [] No  [] Yes  Action:  Comments: Pt arrives stating that L shoulder has been \"the same\", does note that L elbow pain is improving slightly.      Objective:  Frequency:  2 x/week for 20 visits     Todays Treatment:     Exercises:  Exercise  S/p L ulnar nerve decompression    Reps/ Time Weight/ Level Comments   PROM L shoulder and elbow x                 Gentle ulnar nerve glides x       Seated shoulder flex and abd slides 2x10       Seated elbow flex/ext 2x10                  Supine wand shoulder flex  2x10 1#                NUSTEP- BUE only 5' L4 Arms only with focus on LUE elbow ext                                                               Other: Gentle scar massage to L elbow, inferior distraction     Specific Instructions for next treatment: Continue with gentle motion to L shoulder and elbow      Treatment Charges: Mins Units   []  Modalities     [x]  Ther Exercise 30 2   [x]  Manual Therapy 10 1   []  Ther Activities     []  Aquatics     []  Vasocompression     []  Other     Total Treatment time 40 3       Assessment: [x] Progressing toward goals. Initiated with PROM to decrease tightness and increase motion overall, pt with fair tolerance. Continued with passive nerve glides with pt noting some soreness. Progressed AAROM to include supine wand flexion and NUSTEP which pt tolerated well, noting an increase in motion. [] No change. [] Other:  [] Patient would continue to benefit from skilled physical therapy services in order to: Increase L shoulder and elbow ROM, progressing to strengthening once tolerated. Pt would also benefit from ulnar nerve glides to decrease tension along nerves. Goals:                               STG: (to be met in 10 treatments)  ? Pain: Decrease pain levels to 4/10 at worst  ? ROM: Increase flexibility and AROM limitations throughout to equal bilat to reduce difficulty with ADLs, increase L shoulder AROM flex from 85deg to 120deg and abd from 55deg to 100deg  ? Strength: Increase L shoulder flex and abd strength to 4/5  ? Function: Pt to report performing daily tasks without any increase in pain  Independent with Home Exercise Programs        LTG: (to be met in 20 treatments)  Improve score on assessment tool from 78% impaired to 35% impaired demonstrating an improvement in ADLs  Reduce pain levels to 0/10    Pt. Education:  [x] Yes  [] No  [] Reviewed Prior HEP/Ed  Method of Education: [x] Verbal  [x] Demo  [] Written  Comprehension of Education:  [x] Verbalizes understanding. [] Demonstrates understanding. [x] Needs review. [] Demonstrates/verbalizes HEP/Ed previously given. Plan: [x] Continue current frequency toward long and short term goals.           Time In: 1400            Time Out: 6249    Electronically signed by:  Bailee Faith PT

## 2022-12-22 ENCOUNTER — HOSPITAL ENCOUNTER (OUTPATIENT)
Dept: PHYSICAL THERAPY | Facility: CLINIC | Age: 37
Setting detail: THERAPIES SERIES
Discharge: HOME | End: 2022-12-22
Payer: MEDICAID

## 2022-12-22 NOTE — FLOWSHEET NOTE
[] [address]  [address]    [phone]  F: [phone]   [] [address]  [facility] 36   [address]  P: [phone]  F: [phone]  [] [address]  [address] [facility]  P: [phone]  F: [phone] [x] [address]  P: [phone]  F: [phone]  [] [address]  [facility]   [address]   [place]: [phone]  F: [phone]   [] [place] [address] [facility] [address]  [place]: [phone]   F: [phone]     Physical Therapy Cancel/No Show note    Date: 2022  Patient: Jessica Jones  : 1985  MRN: [id number]    Cancels/No Shows to date: 0    For today's appointment patient:    [x]  Cancelled    [] Rescheduled appointment    [] No-show     Reason given by patient:    [x]  Patient ill    []  Conflicting appointment    [] No transportation      [] Conflict with work    [] No reason given    [] Weather related    [] COVID-19    [] Other:      Comments:  Pt called stating to feeling ill, along with having a sick child, and needs to cx today. Next appt confirmed.        [x] Next appointment was confirmed    Electronically signed by: Mee Russell PT

## 2022-12-28 ENCOUNTER — HOSPITAL ENCOUNTER (OUTPATIENT)
Dept: PHYSICAL THERAPY | Facility: CLINIC | Age: 37
Setting detail: THERAPIES SERIES
Discharge: HOME | End: 2022-12-28
Payer: MEDICAID

## 2022-12-28 PROCEDURE — 97110 THERAPEUTIC EXERCISES: CPT

## 2022-12-28 NOTE — FLOWSHEET NOTE
[] [facility] [place]) [facility] &  Therapy  [address]  [phone]  F: [phone] [] [address]  [place] [address]   [address]  P: [phone]  F: [phone] [] [place]  [facility]  [address]  P: [phone]  F: [phone] [x] [address]  P: [phone]  F: [phone] [] [address]  [facility]   [address]   [place]: [phone]  F: [phone]      Physical Therapy Daily Treatment Note    Date:  2022  Patient Name:  Kenn Olszewski    :  1985  MRN: [id number]  Physician: Dr. Joycelyn Rey: Medicaid (24 visits left this year)  Medical Diagnosis: Chronic L shoulder pain                       Rehab Codes:  M25. 512   Onset date: 10/17/22             Next 's appt.:  TBA pending PT  Visit# / total visits: 3/20    Cancels/No Shows: 1/0    Subjective:    Pain:  [] Yes  [] No Location: L shoulder  Pain Rating: (0-10 scale) 8/10  Pain altered Tx:  [] No  [] Yes  Action:  Comments: Pt arrives noting that shoulder is \"the same\".     Pt arrives 13' late, able to accommodate    Objective:  Frequency:  2 x/week for 20 visits     Todays Treatment:     Exercises:  Exercise  S/p L ulnar nerve decompression    Reps/ Time Weight/ Level Comments   PROM L shoulder and elbow x                 Gentle ulnar nerve glides x       Seated shoulder flex and abd slides 2x10       Seated elbow flex/ext 2x10                  Supine wand shoulder flex  2x10 1#                NUSTEP- BUE only 5' L4 Arms only with focus on LUE elbow ext                                                               Other: Gentle scar massage to L elbow, inferior distraction- held      Specific Instructions for next treatment: Continue with gentle motion to L shoulder and elbow      Treatment Charges: Mins Units   []  Modalities     [x]  Ther Exercise 30 2   [x]  Manual Therapy     []  Ther Activities     []  Aquatics     []  Vasocompression     []  Other     Total Treatment time 30 2       Assessment: [x] Progressing toward goals. Initiated with PROM to decrease tightness and increase motion overall, pt with fair tolerance. Continued with passive nerve glides with pt noting some soreness. Pt able to complete 10 reps of supine wand flex. Pt with noted improved tolerance of table slides compared to previous visits. Offered vasocompression, pt states prefers to ice at home. [] No change. [] Other:  [] Patient would continue to benefit from skilled physical therapy services in order to: Increase L shoulder and elbow ROM, progressing to strengthening once tolerated. Pt would also benefit from ulnar nerve glides to decrease tension along nerves. Goals:                               STG: (to be met in 10 treatments)  ? Pain: Decrease pain levels to 4/10 at worst  ? ROM: Increase flexibility and AROM limitations throughout to equal bilat to reduce difficulty with ADLs, increase L shoulder AROM flex from 85deg to 120deg and abd from 55deg to 100deg  ? Strength: Increase L shoulder flex and abd strength to 4/5  ? Function: Pt to report performing daily tasks without any increase in pain  Independent with Home Exercise Programs        LTG: (to be met in 20 treatments)  Improve score on assessment tool from 78% impaired to 35% impaired demonstrating an improvement in ADLs  Reduce pain levels to 0/10    Pt. Education:  [x] Yes  [] No  [] Reviewed Prior HEP/Ed  Method of Education: [x] Verbal  [x] Demo  [] Written  Comprehension of Education:  [x] Verbalizes understanding. [] Demonstrates understanding. [x] Needs review. [] Demonstrates/verbalizes HEP/Ed previously given.      Plan: [x] Continue current frequency toward long and short term goals.           Time In: 1615           Time Out: [address]ell 81.    Electronically signed by:  Jin Beckham, PT

## 2022-12-30 ENCOUNTER — HOSPITAL ENCOUNTER (OUTPATIENT)
Dept: PHYSICAL THERAPY | Facility: CLINIC | Age: 37
Setting detail: THERAPIES SERIES
End: 2022-12-30
Payer: MEDICAID

## 2023-01-22 DIAGNOSIS — E88.81 INSULIN RESISTANCE: ICD-10-CM

## 2023-01-22 DIAGNOSIS — S16.1XXS REPETITIVE STRAIN INJURY OF CERVICAL SPINE, SEQUELA: ICD-10-CM
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
## 2023-01-23 RX ORDER — MELOXICAM 15 MG/1
15 TABLET ORAL DAILY PRN
Qty: 30 TABLET | Refills: 2 | Status: SHIPPED | OUTPATIENT
Start: 2023-01-23 | End: 2023-03-03 | Stop reason: ALTCHOICE

## 2023-03-03 ENCOUNTER — TELEMEDICINE (OUTPATIENT)
Dept: PRIMARY CARE CLINIC | Age: 38
End: 2023-03-03

## 2023-03-03 DIAGNOSIS — Z80.3 FAMILY HISTORY OF BREAST CANCER: Primary | ICD-10-CM

## 2023-03-03 DIAGNOSIS — F33.1 MODERATE EPISODE OF RECURRENT MAJOR DEPRESSIVE DISORDER (HCC): ICD-10-CM

## 2023-03-03 DIAGNOSIS — F31.81 BIPOLAR 2 DISORDER (HCC): ICD-10-CM

## 2023-03-03 DIAGNOSIS — Z12.31 SCREENING MAMMOGRAM FOR BREAST CANCER: ICD-10-CM

## 2023-03-03 DIAGNOSIS — E66.01 MORBID OBESITY DUE TO EXCESS CALORIES (HCC): ICD-10-CM

## 2023-03-03 RX ORDER — ZOLPIDEM TARTRATE 10 MG/1
TABLET ORAL
COMMUNITY
Start: 2023-02-21

## 2023-03-03 RX ORDER — VILAZODONE HYDROCHLORIDE 20 MG/1
TABLET ORAL
COMMUNITY
Start: 2023-02-21

## 2023-03-03 SDOH — ECONOMIC STABILITY: TRANSPORTATION INSECURITY
IN THE PAST 12 MONTHS, HAS LACK OF TRANSPORTATION KEPT YOU FROM MEETINGS, WORK, OR FROM GETTING THINGS NEEDED FOR DAILY LIVING?: YES

## 2023-03-03 SDOH — ECONOMIC STABILITY: HOUSING INSECURITY
IN THE LAST 12 MONTHS, WAS THERE A TIME WHEN YOU DID NOT HAVE A STEADY PLACE TO SLEEP OR SLEPT IN A SHELTER (INCLUDING NOW)?: NO

## 2023-03-03 SDOH — ECONOMIC STABILITY: INCOME INSECURITY: HOW HARD IS IT FOR YOU TO PAY FOR THE VERY BASICS LIKE FOOD, HOUSING, MEDICAL CARE, AND HEATING?: VERY HARD

## 2023-03-03 SDOH — ECONOMIC STABILITY: FOOD INSECURITY: WITHIN THE PAST 12 MONTHS, YOU WORRIED THAT YOUR FOOD WOULD RUN OUT BEFORE YOU GOT MONEY TO BUY MORE.: OFTEN TRUE

## 2023-03-03 SDOH — ECONOMIC STABILITY: FOOD INSECURITY: WITHIN THE PAST 12 MONTHS, THE FOOD YOU BOUGHT JUST DIDN'T LAST AND YOU DIDN'T HAVE MONEY TO GET MORE.: OFTEN TRUE

## 2023-03-03 ASSESSMENT — PATIENT HEALTH QUESTIONNAIRE - PHQ9
SUM OF ALL RESPONSES TO PHQ9 QUESTIONS 1 & 2: 3
1. LITTLE INTEREST OR PLEASURE IN DOING THINGS: 2
3. TROUBLE FALLING OR STAYING ASLEEP: 2
5. POOR APPETITE OR OVEREATING: 2
8. MOVING OR SPEAKING SO SLOWLY THAT OTHER PEOPLE COULD HAVE NOTICED. OR THE OPPOSITE, BEING SO FIGETY OR RESTLESS THAT YOU HAVE BEEN MOVING AROUND A LOT MORE THAN USUAL: 1
7. TROUBLE CONCENTRATING ON THINGS, SUCH AS READING THE NEWSPAPER OR WATCHING TELEVISION: 1
2. FEELING DOWN, DEPRESSED OR HOPELESS: 1
4. FEELING TIRED OR HAVING LITTLE ENERGY: 2
SUM OF ALL RESPONSES TO PHQ QUESTIONS 1-9: 12
SUM OF ALL RESPONSES TO PHQ QUESTIONS 1-9: 12
10. IF YOU CHECKED OFF ANY PROBLEMS, HOW DIFFICULT HAVE THESE PROBLEMS MADE IT FOR YOU TO DO YOUR WORK, TAKE CARE OF THINGS AT HOME, OR GET ALONG WITH OTHER PEOPLE: 1
SUM OF ALL RESPONSES TO PHQ QUESTIONS 1-9: 12
SUM OF ALL RESPONSES TO PHQ QUESTIONS 1-9: 12
6. FEELING BAD ABOUT YOURSELF - OR THAT YOU ARE A FAILURE OR HAVE LET YOURSELF OR YOUR FAMILY DOWN: 1
9. THOUGHTS THAT YOU WOULD BE BETTER OFF DEAD, OR OF HURTING YOURSELF: 0

## 2023-03-03 ASSESSMENT — ENCOUNTER SYMPTOMS
CONSTIPATION: 0
VOMITING: 0
DIARRHEA: 0
BACK PAIN: 0
SINUS PAIN: 0
SHORTNESS OF BREATH: 0
NAUSEA: 0
RHINORRHEA: 0
COUGH: 0
ABDOMINAL PAIN: 0

## 2023-03-03 NOTE — PROGRESS NOTES
3/3/2023    TELEHEALTH EVALUATION -- Audio/Visual (During [id number]-57 public health emergency)    HPI:    Elenita Garg (:  1985) has requested an audio/video evaluation for the following concern(s):    Patient presents as a virtual visit for discussion mammogram and breast cancer screening and weight. Patient describes significant family history of breast cancer including mother, aunt, sister. She reports her aunt passed away at a young age secondary to breast cancer. Patient would like to discuss mammogram.  No previous studies. Denies any changes to breasts. Patient is also worried for weight gain. She reports significant weight gain since starting psychiatric medication for bipolar disorder. She is having difficulty with managing weight secondary to mental health. She has tried making dietary changes to reduce, hydrates and sugary beverages. She does not participate in regular physical activity. Of note, her psychiatrist has change medication to Nahid Quince in order to reduce antipsychotic side effect. She reports this medication is going well. Review of Systems   Constitutional:  Negative for chills, fatigue and fever. HENT:  Negative for congestion, rhinorrhea and sinus pain. Respiratory:  Negative for cough and shortness of breath. Cardiovascular:  Negative for chest pain and leg swelling. Gastrointestinal:  Negative for abdominal pain, constipation, diarrhea, nausea and vomiting. Genitourinary:  Negative for difficulty urinating, frequency and urgency. Musculoskeletal:  Negative for arthralgias, back pain and myalgias. Neurological:  Negative for dizziness and headaches. Psychiatric/Behavioral:  Positive for dysphoric mood. Negative for confusion and sleep disturbance. The patient is nervous/anxious. All other systems reviewed and are negative. Prior to Visit Medications    Medication Sig Taking?  Authorizing Provider   vilazodone HCl (VIIBRYD) 20 MG TABS TAKE 1 TABLET BY MOUTH ONCE DAILY TAKING THE PLACE OF PROZAC Yes Historical Provider, MD   zolpidem (AMBIEN) 10 MG tablet TAKE 1/2 TO 1 (ONE-HALF TO ONE) TABLET BY MOUTH AT BEDTIME AS NEEDED FOR INSOMNIA FOR 30 DAYS Yes Historical Provider, MD   metFORMIN (GLUCOPHAGE) 500 MG tablet Take 1 tablet by mouth once daily with breakfast Yes Haroon Banks PA-C   cariprazine hcl (VRAYLAR) 1.5 MG capsule Take 1.5 mg by mouth daily Yes Historical Provider, MD       Social History     Tobacco Use    Smoking status: Never    Smokeless tobacco: Never    Tobacco comments:     uses vape   Vaping Use    Vaping Use: Every day    Substances: Nicotine, THC, CBD, Flavoring   Substance Use Topics    Alcohol use: No    Drug use: No        Allergies   Allergen Reactions    Latex     Imitrex [Sumatriptan] Anaphylaxis    Zoloft [Sertraline Hcl] Anaphylaxis    Zomig [Zolmitriptan] Anaphylaxis    Pcn [Penicillins] Other (See Comments)     \"Upset stomach\"   ,   Past Medical History:   Diagnosis Date    Back pain     herniated disc    Kidney infection     Kidney stone     Migraines     Ovarian cyst    ,   Past Surgical History:   Procedure Laterality Date    DILATION AND CURETTAGE OF UTERUS      ENDOMETRIAL ABLATION      HYSTERECTOMY (CERVIX STATUS UNKNOWN)      TUBAL LIGATION     ,   Social History     Tobacco Use    Smoking status: Never    Smokeless tobacco: Never    Tobacco comments:     uses vape   Vaping Use    Vaping Use: Every day    Substances: Nicotine, THC, CBD, Flavoring   Substance Use Topics    Alcohol use: No    Drug use: No   ,   Family History   Problem Relation Age of Onset    Heart Disease Mother     Breast Cancer Mother     Ovarian Cancer Mother     Uterine Cancer Mother     Stroke Father     Heart Attack Father     Liver Disease Father     Breast Cancer Sister     Stroke Sister     Thyroid Disease Sister     Breast Cancer Maternal Aunt     Leukemia Maternal Aunt     Brain Cancer Maternal Aunt     Breast Cancer Paternal Aunt Breast Cancer Maternal Grandmother     Stroke Maternal Grandmother     Heart Attack Maternal Grandmother     Crohn's Disease Maternal Cousin     Crohn's Disease Paternal Cousin    ,   There is no immunization history on file for this patient.    PHYSICAL EXAMINATION:  [ INSTRUCTIONS:  \"[x]\" Indicates a positive item  \"[]\" Indicates a negative item  -- DELETE ALL ITEMS NOT EXAMINED]  Vital Signs: (As obtained by patient/caregiver or practitioner observation)      Constitutional: [x] Appears well-developed and well-nourished [x] No apparent distress      [] Abnormal-   Mental status  [x] Alert and awake  [x] Oriented to person/place/time [x]Able to follow commands      Eyes:  EOM    [x]  Normal  [] Abnormal-  Sclera  [x]  Normal  [] Abnormal -         Discharge [x]  None visible  [] Abnormal -    HENT:   [x] Normocephalic, atraumatic.  [] Abnormal   [x] Mouth/Throat: Mucous membranes are moist.     External Ears [x] Normal  [] Abnormal-     Neck: [x] No visualized mass     Pulmonary/Chest: [x] Respiratory effort normal.  [x] No visualized signs of difficulty breathing or respiratory distress        [] Abnormal-      Musculoskeletal:   [x] Normal range of motion of neck        [] Abnormal-       Neurological:        [x] No Facial Asymmetry (Cranial nerve 7 motor function) (limited exam to video visit)          [x] No gaze palsy        [] Abnormal-         Skin:        [x] No significant exanthematous lesions or discoloration noted on facial skin         [] Abnormal-            Psychiatric:       [x] Normal Affect [x] No Hallucinations        [] Abnormal-     Other pertinent observable physical exam findings-     ASSESSMENT/PLAN:  1. Family history of breast cancer  - [facility]    2. Screening mammogram for breast cancer  - [facility] DIGITAL SCREEN W OR WO CAD BILATERAL; Future    3. Morbid obesity due to excess calories (HCC)  - Hemoglobin A1C; Future    4. Bipolar 2 disorder (HCC)      5. Moderate  episode of recurrent major depressive disorder [facility])    Patient with significant family history of breast cancer. Plan to refer for high risk breast cancer clinic in screening mammogram.  She is agreeable. We do long discussion about weight management. I strongly recommended monitoring for excess carbohydrates and large portions. Plan to check A1c to rule out prediabetes or diabetes. Patient to continue following with psychiatry for management of bipolar and depression. Return for after approval of GLP-1. Sherryle Issa, was evaluated through a synchronous (real-time) audio-video encounter. The patient (or guardian if applicable) is aware that this is a billable service, which includes applicable co-pays. This Virtual Visit was conducted with patient's (and/or legal guardian's) consent. The visit was conducted pursuant to the emergency declaration under the [facility], [facility] waiver authority and the [employer] Act. Patient identification was verified, and a caregiver was present when appropriate. The patient was located at Home: [address] [facility]  [address]  Provider was located at [facility] (Appt Dept): [address] [facility],  [address]        Total time spent on this encounter: Not billed by time    --Niko Elizabeth PA-C on 3/3/2023 at 8:12 PM    An electronic signature was used to authenticate this note.

## 2023-03-15 ENCOUNTER — PATIENT MESSAGE (OUTPATIENT)
Dept: PRIMARY CARE CLINIC | Age: 38
End: 2023-03-15

## 2023-03-15 DIAGNOSIS — Z13.29 THYROID DISORDER SCREENING: Primary | ICD-10-CM

## 2023-03-20 ENCOUNTER — HOSPITAL ENCOUNTER (OUTPATIENT)
Age: 38
Setting detail: SPECIMEN
Discharge: HOME | End: 2023-03-20

## 2023-03-20 DIAGNOSIS — Z13.29 THYROID DISORDER SCREENING: ICD-10-CM

## 2023-03-20 DIAGNOSIS — E66.01 MORBID OBESITY DUE TO EXCESS CALORIES (HCC): ICD-10-CM

## 2023-03-20 LAB
T3FREE SERPL-MCNC: 2.68 PG/ML (ref 2.02–4.43)
T4 FREE SERPL-MCNC: 0.92 NG/DL (ref 0.93–1.7)
TSH SERPL-ACNC: 1.28 UIU/ML (ref 0.3–5)

## 2023-03-21 LAB
EST. AVERAGE GLUCOSE BLD GHB EST-MCNC: 120 MG/DL
HBA1C MFR BLD: 5.8 % (ref 4–6)

## 2023-03-22 DIAGNOSIS — R73.03 PREDIABETES: ICD-10-CM

## 2023-03-22 DIAGNOSIS — E66.01 MORBID OBESITY DUE TO EXCESS CALORIES (HCC): Primary | ICD-10-CM

## 2023-03-22 DIAGNOSIS — E88.81 INSULIN RESISTANCE: ICD-10-CM

## 2023-03-22 LAB
THYROGLOBULIN AB: <12 IU/ML (ref 0–40)
THYROPEROXIDASE AB SERPL IA-ACNC: <4 IU/ML (ref 0–25)

## 2023-03-22 NOTE — RESULT ENCOUNTER NOTE
Also advised pt of prediabetes, she states she is monitoring her foods and drinks and continuing her Metformin. She did say it was mentioned in her last visit with Monique Burgess about an injection that may help. She wanted to know if this was something that could be called in? She does not have an upcoming appt as of yet. Thank you.

## 2023-03-22 NOTE — RESULT ENCOUNTER NOTE
Pt advised that Ozempic sent to pharmacy, she will see if covered and call if any questions or problems. Thank you.

## 2023-03-30 ENCOUNTER — HOSPITAL ENCOUNTER (EMERGENCY)
Facility: CLINIC | Age: 38
Discharge: HOME | End: 2023-03-30
Attending: EMERGENCY MEDICINE
Payer: MEDICAID

## 2023-03-30 VITALS
HEART RATE: 93 BPM | OXYGEN SATURATION: 98 % | DIASTOLIC BLOOD PRESSURE: 105 MMHG | SYSTOLIC BLOOD PRESSURE: 156 MMHG | WEIGHT: 278 LBS | TEMPERATURE: 98.1 F | BODY MASS INDEX: 51.16 KG/M2 | RESPIRATION RATE: 18 BRPM | HEIGHT: 62 IN

## 2023-03-30 DIAGNOSIS — F41.1 ANXIETY STATE: Primary | ICD-10-CM

## 2023-03-30 PROCEDURE — 99283 EMERGENCY DEPT VISIT LOW MDM: CPT

## 2023-03-30 PROCEDURE — 6370000000 HC RX 637 (ALT 250 FOR IP): Performed by: EMERGENCY MEDICINE

## 2023-03-30 RX ORDER — LORAZEPAM 1 MG/1
1 TABLET ORAL ONCE
Status: COMPLETED | OUTPATIENT
Start: 2023-03-30 | End: 2023-03-30

## 2023-03-30 RX ORDER — LORAZEPAM 1 MG/1
1 TABLET ORAL 2 TIMES DAILY PRN
Qty: 6 TABLET | Refills: 0 | Status: SHIPPED | OUTPATIENT
Start: 2023-03-30 | End: 2023-04-05

## 2023-03-30 RX ADMIN — LORAZEPAM 0.5 MG: 1 TABLET ORAL at 11:34

## 2023-03-30 ASSESSMENT — PAIN - FUNCTIONAL ASSESSMENT: PAIN_FUNCTIONAL_ASSESSMENT: NONE - DENIES PAIN

## 2023-03-30 NOTE — ED PROVIDER NOTES
have NOT CHANGED    Details   Semaglutide,0.25 or 0.5MG/DOS, 2 MG/1.5ML SOPN Inject 0.25 mg into the skin once a week, Disp-1.5 mL, R-0Normal      vilazodone HCl (VIIBRYD) 20 MG TABS TAKE 1 TABLET BY MOUTH ONCE DAILY TAKING THE PLACE OF PROZACHistorical Med      zolpidem (AMBIEN) 10 MG tablet TAKE 1/2 TO 1 (ONE-HALF TO ONE) TABLET BY MOUTH AT BEDTIME AS NEEDED FOR INSOMNIA FOR 30 DAYSHistorical Med      metFORMIN (GLUCOPHAGE) 500 MG tablet Take 1 tablet by mouth once daily with breakfast, Disp-30 tablet, R-2Normal      cariprazine hcl (VRAYLAR) 1.5 MG capsule Take 1.5 mg by mouth dailyHistorical Med             ALLERGIES     Latex, Imitrex [sumatriptan], Zoloft [sertraline hcl], Zomig [zolmitriptan], and Pcn [penicillins]    FAMILY HISTORY       Family History   Problem Relation Age of Onset    Heart Disease Mother     Breast Cancer Mother     Ovarian Cancer Mother     Uterine Cancer Mother     Stroke Father     Heart Attack Father     Liver Disease Father     Breast Cancer Sister     Stroke Sister     Thyroid Disease Sister     Breast Cancer Maternal Aunt     Leukemia Maternal Aunt     Brain Cancer Maternal Aunt     Breast Cancer Paternal Aunt     Breast Cancer Maternal Grandmother     Stroke Maternal Grandmother     Heart Attack Maternal Grandmother     Crohn's Disease Maternal Cousin     Crohn's Disease Paternal Cousin           SOCIAL HISTORY       Social History     Socioeconomic History    Marital status:    Tobacco Use    Smoking status: Never    Smokeless tobacco: Never    Tobacco comments:     uses vape   Vaping Use    Vaping Use: Every day    Substances: Nicotine, THC, CBD, Flavoring   Substance and Sexual Activity    Alcohol use: No    Drug use: No    Sexual activity: Yes     Partners: Male     Social Determinants of Health     Financial Resource Strain: High Risk    Difficulty of Paying Living Expenses: Very hard   Food Insecurity: Food Insecurity Present    Worried About Running Out of Food in present. Mental Status: She is alert. Mental status is at baseline. Motor: No weakness. Comments: Speaking normally. No facial asymmetry. Moving all 4 extremities. Normal gait. Psychiatric:         Mood and Affect: Mood normal.         Behavior: Behavior normal.       EMERGENCY DEPARTMENT COURSE and DIFFERENTIAL DIAGNOSIS/MDM:   Vitals:    Vitals:    03/30/23 1126   BP: (!) 156/105   Pulse: 93   Resp: 18   Temp: 98.1 °F (36.7 °C)   TempSrc: Oral   SpO2: 98%   Weight: 126.1 kg (278 lb)   Height: 5' 2\" (1.575 m)       Patient presents to the emergency department with the complaint described above. Vital signs show hypertension, otherwise unremarkable. Physical exam reveals no obvious abnormalities or deficits. She is mildly anxious. I discussed short-term treatment as well as long-term solutions and what to return to ER for    At this time the patient is without objective evidence of an acute process requiring hospitalization or inpatient management. They have remained hemodynamically stable and are stable for discharge with outpatient follow-up. Standard anticipatory guidance given to patient upon discharge. Have given them a specific time frame in which to follow-up and who to follow-up with. I have also advised them that they should return to the emergency department if they get worse, or not getting better or develop any new or concerning symptoms. Patient demonstrates understanding. PROCEDURES:  Unless otherwise noted below, none     Procedures    FINAL IMPRESSION      1. Anxiety state          DISPOSITION/PLAN   DISPOSITION Decision To Discharge 03/30/2023 11:27:59 AM      PATIENT REFERRED TO:  Chloe Mathew[address]  Dr. All Peter [address] [place] [id number]  [phone]    In 3 days      DISCHARGE MEDICATIONS:  Discharge Medication List as of 3/30/2023 11:29 AM        START taking these medications    Details   LORazepam (ATIVAN) 1 MG tablet Take 1 tablet by mouth 2

## 2023-04-02 ENCOUNTER — PATIENT MESSAGE (OUTPATIENT)
Dept: PRIMARY CARE CLINIC | Age: 38
End: 2023-04-02

## 2023-04-03 NOTE — TELEPHONE ENCOUNTER
Please contact patient to see if she got a hold of psychiatry today. If not I will send in 7 day supply. However, I strongly advise close psych follow-up.     Nereyda Diez

## 2023-04-03 NOTE — TELEPHONE ENCOUNTER
From: Osbaldo Escamilla  To: [place] [id number] [address]: 4/2/2023 4:58 PM EDT  Subject: Refills    I have been unable to get ahold of my psychiatrist to get a refill for my medications and my therapist said to ask if it was possible to have them called in through your office. I hate to ask but I'm going to be out on Tuesday and scared I'll end up in the psych hameed again. The medication is  Vraylar 3mg  Viibryd 20mg  Buspirione 5mg  Hydroxzine Cherry 50mg    Thank you if this is possible.

## 2023-04-06 ENCOUNTER — OFFICE VISIT (OUTPATIENT)
Dept: PHYSICAL MEDICINE AND REHAB | Age: 38
End: 2023-04-06
Payer: COMMERCIAL

## 2023-04-06 ENCOUNTER — TELEPHONE (OUTPATIENT)
Dept: PRIMARY CARE CLINIC | Age: 38
End: 2023-04-06

## 2023-04-06 VITALS
BODY MASS INDEX: 50.79 KG/M2 | DIASTOLIC BLOOD PRESSURE: 90 MMHG | SYSTOLIC BLOOD PRESSURE: 131 MMHG | TEMPERATURE: 98.1 F | WEIGHT: 276 LBS | HEART RATE: 97 BPM | HEIGHT: 62 IN

## 2023-04-06 DIAGNOSIS — G89.29 CHRONIC LEFT SHOULDER PAIN: Primary | ICD-10-CM

## 2023-04-06 DIAGNOSIS — G56.22 ULNAR NEUROPATHY AT ELBOW, LEFT: ICD-10-CM

## 2023-04-06 DIAGNOSIS — M25.512 CHRONIC LEFT SHOULDER PAIN: Primary | ICD-10-CM

## 2023-04-06 PROCEDURE — 99214 OFFICE O/P EST MOD 30 MIN: CPT | Performed by: STUDENT IN AN ORGANIZED HEALTH CARE EDUCATION/TRAINING PROGRAM

## 2023-04-06 PROCEDURE — G8427 DOCREV CUR MEDS BY ELIG CLIN: HCPCS | Performed by: STUDENT IN AN ORGANIZED HEALTH CARE EDUCATION/TRAINING PROGRAM

## 2023-04-06 PROCEDURE — 1036F TOBACCO NON-USER: CPT | Performed by: STUDENT IN AN ORGANIZED HEALTH CARE EDUCATION/TRAINING PROGRAM

## 2023-04-06 PROCEDURE — G8417 CALC BMI ABV UP PARAM F/U: HCPCS | Performed by: STUDENT IN AN ORGANIZED HEALTH CARE EDUCATION/TRAINING PROGRAM

## 2023-04-06 RX ORDER — FLUOXETINE 10 MG/1
CAPSULE ORAL
COMMUNITY
Start: 2023-03-28

## 2023-04-06 RX ORDER — FLUOXETINE HYDROCHLORIDE 20 MG/1
CAPSULE ORAL
COMMUNITY
Start: 2023-03-31

## 2023-04-06 RX ORDER — TRAZODONE HYDROCHLORIDE 50 MG/1
50 TABLET ORAL NIGHTLY
COMMUNITY
Start: 2023-03-12

## 2023-04-06 RX ORDER — CYCLOBENZAPRINE HCL 10 MG
10 TABLET ORAL NIGHTLY PRN
Qty: 30 TABLET | Refills: 0 | Status: SHIPPED | OUTPATIENT
Start: 2023-04-06 | End: 2023-05-06

## 2023-04-06 RX ORDER — OLANZAPINE 15 MG/1
15 TABLET ORAL NIGHTLY
COMMUNITY
Start: 2023-04-03

## 2023-04-06 RX ORDER — HYDROXYZINE 50 MG/1
TABLET, FILM COATED ORAL
COMMUNITY
Start: 2023-04-03

## 2023-04-18 ENCOUNTER — ANESTHESIA EVENT (OUTPATIENT)
Dept: OPERATING ROOM | Age: 38
End: 2023-04-18
Payer: COMMERCIAL

## 2023-04-19 ENCOUNTER — HOSPITAL ENCOUNTER (OUTPATIENT)
Age: 38
Setting detail: OUTPATIENT SURGERY
Discharge: HOME | End: 2023-04-19
Attending: ORTHOPAEDIC SURGERY | Admitting: ORTHOPAEDIC SURGERY
Payer: COMMERCIAL

## 2023-04-19 ENCOUNTER — ANESTHESIA (OUTPATIENT)
Dept: OPERATING ROOM | Age: 38
End: 2023-04-19
Payer: COMMERCIAL

## 2023-04-19 VITALS
DIASTOLIC BLOOD PRESSURE: 89 MMHG | HEART RATE: 79 BPM | HEIGHT: 63 IN | OXYGEN SATURATION: 94 % | SYSTOLIC BLOOD PRESSURE: 136 MMHG | BODY MASS INDEX: 49.96 KG/M2 | WEIGHT: 282 LBS | RESPIRATION RATE: 18 BRPM | TEMPERATURE: 96.9 F

## 2023-04-19 DIAGNOSIS — G89.18 ACUTE POST-OPERATIVE PAIN: Primary | ICD-10-CM

## 2023-04-19 PROCEDURE — 6360000002 HC RX W HCPCS

## 2023-04-19 PROCEDURE — 3700000000 HC ANESTHESIA ATTENDED CARE: Performed by: ORTHOPAEDIC SURGERY

## 2023-04-19 PROCEDURE — 64415 NJX AA&/STRD BRCH PLXS IMG: CPT | Performed by: ANESTHESIOLOGY

## 2023-04-19 PROCEDURE — 2580000003 HC RX 258: Performed by: ANESTHESIOLOGY

## 2023-04-19 PROCEDURE — 3700000001 HC ADD 15 MINUTES (ANESTHESIA): Performed by: ORTHOPAEDIC SURGERY

## 2023-04-19 PROCEDURE — 6360000002 HC RX W HCPCS: Performed by: STUDENT IN AN ORGANIZED HEALTH CARE EDUCATION/TRAINING PROGRAM

## 2023-04-19 PROCEDURE — 2500000003 HC RX 250 WO HCPCS: Performed by: ANESTHESIOLOGY

## 2023-04-19 PROCEDURE — C9290 INJ, BUPIVACAINE LIPOSOME: HCPCS | Performed by: ANESTHESIOLOGY

## 2023-04-19 PROCEDURE — 6360000002 HC RX W HCPCS: Performed by: ANESTHESIOLOGY

## 2023-04-19 PROCEDURE — 7100000000 HC PACU RECOVERY - FIRST 15 MIN: Performed by: ORTHOPAEDIC SURGERY

## 2023-04-19 PROCEDURE — 3600000014 HC SURGERY LEVEL 4 ADDTL 15MIN: Performed by: ORTHOPAEDIC SURGERY

## 2023-04-19 PROCEDURE — 2709999900 HC NON-CHARGEABLE SUPPLY: Performed by: ORTHOPAEDIC SURGERY

## 2023-04-19 PROCEDURE — 7100000011 HC PHASE II RECOVERY - ADDTL 15 MIN: Performed by: ORTHOPAEDIC SURGERY

## 2023-04-19 PROCEDURE — 6370000000 HC RX 637 (ALT 250 FOR IP)

## 2023-04-19 PROCEDURE — 2580000003 HC RX 258: Performed by: ORTHOPAEDIC SURGERY

## 2023-04-19 PROCEDURE — 2500000003 HC RX 250 WO HCPCS

## 2023-04-19 PROCEDURE — 2720000010 HC SURG SUPPLY STERILE: Performed by: ORTHOPAEDIC SURGERY

## 2023-04-19 PROCEDURE — 7100000001 HC PACU RECOVERY - ADDTL 15 MIN: Performed by: ORTHOPAEDIC SURGERY

## 2023-04-19 PROCEDURE — 3600000004 HC SURGERY LEVEL 4 BASE: Performed by: ORTHOPAEDIC SURGERY

## 2023-04-19 PROCEDURE — 7100000010 HC PHASE II RECOVERY - FIRST 15 MIN: Performed by: ORTHOPAEDIC SURGERY

## 2023-04-19 RX ORDER — OXYCODONE HYDROCHLORIDE AND ACETAMINOPHEN 5; 325 MG/1; MG/1
1 TABLET ORAL
Status: DISCONTINUED | OUTPATIENT
Start: 2023-04-19 | End: 2023-04-19 | Stop reason: HOSPADM

## 2023-04-19 RX ORDER — MIDAZOLAM HYDROCHLORIDE 2 MG/2ML
2 INJECTION, SOLUTION INTRAMUSCULAR; INTRAVENOUS ONCE
Status: COMPLETED | OUTPATIENT
Start: 2023-04-19 | End: 2023-04-19

## 2023-04-19 RX ORDER — SODIUM CHLORIDE 0.9 % (FLUSH) 0.9 %
5-40 SYRINGE (ML) INJECTION PRN
Status: DISCONTINUED | OUTPATIENT
Start: 2023-04-19 | End: 2023-04-19 | Stop reason: HOSPADM

## 2023-04-19 RX ORDER — ONDANSETRON 2 MG/ML
4 INJECTION INTRAMUSCULAR; INTRAVENOUS
Status: DISCONTINUED | OUTPATIENT
Start: 2023-04-19 | End: 2023-04-19 | Stop reason: HOSPADM

## 2023-04-19 RX ORDER — MORPHINE SULFATE 2 MG/ML
2 INJECTION, SOLUTION INTRAMUSCULAR; INTRAVENOUS EVERY 5 MIN PRN
Status: DISCONTINUED | OUTPATIENT
Start: 2023-04-19 | End: 2023-04-19 | Stop reason: HOSPADM

## 2023-04-19 RX ORDER — FENTANYL CITRATE 50 UG/ML
INJECTION, SOLUTION INTRAMUSCULAR; INTRAVENOUS
Status: DISCONTINUED
Start: 2023-04-19 | End: 2023-04-19 | Stop reason: HOSPADM

## 2023-04-19 RX ORDER — HYDRALAZINE HYDROCHLORIDE 20 MG/ML
10 INJECTION INTRAMUSCULAR; INTRAVENOUS
Status: DISCONTINUED | OUTPATIENT
Start: 2023-04-19 | End: 2023-04-19 | Stop reason: HOSPADM

## 2023-04-19 RX ORDER — APREPITANT 40 MG/1
40 CAPSULE ORAL ONCE
Status: COMPLETED | OUTPATIENT
Start: 2023-04-19 | End: 2023-04-19

## 2023-04-19 RX ORDER — SCOLOPAMINE TRANSDERMAL SYSTEM 1 MG/1
1 PATCH, EXTENDED RELEASE TRANSDERMAL ONCE
Status: DISCONTINUED | OUTPATIENT
Start: 2023-04-19 | End: 2023-04-19 | Stop reason: HOSPADM

## 2023-04-19 RX ORDER — OXYCODONE HYDROCHLORIDE AND ACETAMINOPHEN 5; 325 MG/1; MG/1
1 TABLET ORAL EVERY 6 HOURS PRN
Qty: 28 TABLET | Refills: 0 | Status: SHIPPED | OUTPATIENT
Start: 2023-04-19 | End: 2023-04-26

## 2023-04-19 RX ORDER — MIDAZOLAM HYDROCHLORIDE 1 MG/ML
INJECTION INTRAMUSCULAR; INTRAVENOUS
Status: COMPLETED
Start: 2023-04-19 | End: 2023-04-19

## 2023-04-19 RX ORDER — BUPIVACAINE HYDROCHLORIDE 5 MG/ML
INJECTION, SOLUTION EPIDURAL; INTRACAUDAL
Status: COMPLETED
Start: 2023-04-19 | End: 2023-04-19

## 2023-04-19 RX ORDER — SODIUM CHLORIDE 9 MG/ML
INJECTION, SOLUTION INTRAVENOUS PRN
Status: DISCONTINUED | OUTPATIENT
Start: 2023-04-19 | End: 2023-04-19 | Stop reason: HOSPADM

## 2023-04-19 RX ORDER — SEVOFLURANE 250 ML/250ML
LIQUID RESPIRATORY (INHALATION)
Status: DISCONTINUED
Start: 2023-04-19 | End: 2023-04-19 | Stop reason: HOSPADM

## 2023-04-19 RX ORDER — MIDAZOLAM HYDROCHLORIDE 2 MG/2ML
2 INJECTION, SOLUTION INTRAMUSCULAR; INTRAVENOUS
Status: DISCONTINUED | OUTPATIENT
Start: 2023-04-19 | End: 2023-04-19 | Stop reason: HOSPADM

## 2023-04-19 RX ORDER — EPINEPHRINE 1 MG/ML
INJECTION, SOLUTION INTRAMUSCULAR; SUBCUTANEOUS
Status: DISCONTINUED
Start: 2023-04-19 | End: 2023-04-19 | Stop reason: HOSPADM

## 2023-04-19 RX ORDER — OXYCODONE HYDROCHLORIDE AND ACETAMINOPHEN 5; 325 MG/1; MG/1
2 TABLET ORAL
Status: DISCONTINUED | OUTPATIENT
Start: 2023-04-19 | End: 2023-04-19 | Stop reason: HOSPADM

## 2023-04-19 RX ORDER — ROCURONIUM BROMIDE 10 MG/ML
INJECTION, SOLUTION INTRAVENOUS PRN
Status: DISCONTINUED | OUTPATIENT
Start: 2023-04-19 | End: 2023-04-19 | Stop reason: SDUPTHER

## 2023-04-19 RX ORDER — LIDOCAINE HYDROCHLORIDE 10 MG/ML
INJECTION, SOLUTION INFILTRATION; PERINEURAL PRN
Status: DISCONTINUED | OUTPATIENT
Start: 2023-04-19 | End: 2023-04-19 | Stop reason: SDUPTHER

## 2023-04-19 RX ORDER — GLYCOPYRROLATE 0.2 MG/ML
0.4 INJECTION INTRAMUSCULAR; INTRAVENOUS ONCE
Status: DISCONTINUED | OUTPATIENT
Start: 2023-04-19 | End: 2023-04-19 | Stop reason: HOSPADM

## 2023-04-19 RX ORDER — SODIUM CHLORIDE, SODIUM LACTATE, POTASSIUM CHLORIDE, CALCIUM CHLORIDE 600; 310; 30; 20 MG/100ML; MG/100ML; MG/100ML; MG/100ML
INJECTION, SOLUTION INTRAVENOUS CONTINUOUS PRN
Status: COMPLETED | OUTPATIENT
Start: 2023-04-19 | End: 2023-04-19

## 2023-04-19 RX ORDER — DEXAMETHASONE SODIUM PHOSPHATE 10 MG/ML
INJECTION, SOLUTION INTRAMUSCULAR; INTRAVENOUS PRN
Status: DISCONTINUED | OUTPATIENT
Start: 2023-04-19 | End: 2023-04-19 | Stop reason: SDUPTHER

## 2023-04-19 RX ORDER — LABETALOL HYDROCHLORIDE 5 MG/ML
10 INJECTION, SOLUTION INTRAVENOUS
Status: DISCONTINUED | OUTPATIENT
Start: 2023-04-19 | End: 2023-04-19 | Stop reason: HOSPADM

## 2023-04-19 RX ORDER — PROPOFOL 10 MG/ML
INJECTION, EMULSION INTRAVENOUS PRN
Status: DISCONTINUED | OUTPATIENT
Start: 2023-04-19 | End: 2023-04-19 | Stop reason: SDUPTHER

## 2023-04-19 RX ORDER — APREPITANT 40 MG/1
CAPSULE ORAL
Status: COMPLETED
Start: 2023-04-19 | End: 2023-04-19

## 2023-04-19 RX ORDER — DIPHENHYDRAMINE HYDROCHLORIDE 50 MG/ML
INJECTION INTRAMUSCULAR; INTRAVENOUS PRN
Status: DISCONTINUED | OUTPATIENT
Start: 2023-04-19 | End: 2023-04-19 | Stop reason: SDUPTHER

## 2023-04-19 RX ORDER — SODIUM CHLORIDE 0.9 % (FLUSH) 0.9 %
5-40 SYRINGE (ML) INJECTION EVERY 12 HOURS SCHEDULED
Status: DISCONTINUED | OUTPATIENT
Start: 2023-04-19 | End: 2023-04-19 | Stop reason: HOSPADM

## 2023-04-19 RX ORDER — PHENYLEPHRINE HCL IN 0.9% NACL 1 MG/10 ML
SYRINGE (ML) INTRAVENOUS PRN
Status: DISCONTINUED | OUTPATIENT
Start: 2023-04-19 | End: 2023-04-19 | Stop reason: SDUPTHER

## 2023-04-19 RX ORDER — NEOSTIGMINE METHYLSULFATE 5 MG/5 ML
SYRINGE (ML) INTRAVENOUS PRN
Status: DISCONTINUED | OUTPATIENT
Start: 2023-04-19 | End: 2023-04-19 | Stop reason: SDUPTHER

## 2023-04-19 RX ORDER — FAMOTIDINE 10 MG/ML
INJECTION, SOLUTION INTRAVENOUS
Status: DISCONTINUED
Start: 2023-04-19 | End: 2023-04-19 | Stop reason: HOSPADM

## 2023-04-19 RX ORDER — IPRATROPIUM BROMIDE AND ALBUTEROL SULFATE 2.5; .5 MG/3ML; MG/3ML
1 SOLUTION RESPIRATORY (INHALATION)
Status: DISCONTINUED | OUTPATIENT
Start: 2023-04-19 | End: 2023-04-19 | Stop reason: HOSPADM

## 2023-04-19 RX ORDER — SCOLOPAMINE TRANSDERMAL SYSTEM 1 MG/1
PATCH, EXTENDED RELEASE TRANSDERMAL
Status: DISCONTINUED
Start: 2023-04-19 | End: 2023-04-19 | Stop reason: HOSPADM

## 2023-04-19 RX ORDER — LIDOCAINE HYDROCHLORIDE 10 MG/ML
1 INJECTION, SOLUTION INFILTRATION; PERINEURAL
Status: DISCONTINUED | OUTPATIENT
Start: 2023-04-19 | End: 2023-04-19 | Stop reason: HOSPADM

## 2023-04-19 RX ORDER — METOCLOPRAMIDE HYDROCHLORIDE 5 MG/ML
10 INJECTION INTRAMUSCULAR; INTRAVENOUS
Status: DISCONTINUED | OUTPATIENT
Start: 2023-04-19 | End: 2023-04-19 | Stop reason: HOSPADM

## 2023-04-19 RX ORDER — SODIUM CHLORIDE 9 MG/ML
25 INJECTION, SOLUTION INTRAVENOUS PRN
Status: DISCONTINUED | OUTPATIENT
Start: 2023-04-19 | End: 2023-04-19 | Stop reason: HOSPADM

## 2023-04-19 RX ORDER — FENTANYL CITRATE 50 UG/ML
INJECTION, SOLUTION INTRAMUSCULAR; INTRAVENOUS PRN
Status: DISCONTINUED | OUTPATIENT
Start: 2023-04-19 | End: 2023-04-19 | Stop reason: SDUPTHER

## 2023-04-19 RX ORDER — GLYCOPYRROLATE 0.2 MG/ML
INJECTION INTRAMUSCULAR; INTRAVENOUS PRN
Status: DISCONTINUED | OUTPATIENT
Start: 2023-04-19 | End: 2023-04-19 | Stop reason: SDUPTHER

## 2023-04-19 RX ORDER — FENTANYL CITRATE 50 UG/ML
100 INJECTION, SOLUTION INTRAMUSCULAR; INTRAVENOUS ONCE
Status: COMPLETED | OUTPATIENT
Start: 2023-04-19 | End: 2023-04-19

## 2023-04-19 RX ORDER — DIPHENHYDRAMINE HYDROCHLORIDE 50 MG/ML
12.5 INJECTION INTRAMUSCULAR; INTRAVENOUS
Status: DISCONTINUED | OUTPATIENT
Start: 2023-04-19 | End: 2023-04-19 | Stop reason: HOSPADM

## 2023-04-19 RX ORDER — PROMETHAZINE HYDROCHLORIDE 25 MG/ML
6.25 INJECTION, SOLUTION INTRAMUSCULAR; INTRAVENOUS EVERY 5 MIN PRN
Status: DISCONTINUED | OUTPATIENT
Start: 2023-04-19 | End: 2023-04-19 | Stop reason: HOSPADM

## 2023-04-19 RX ORDER — SODIUM CHLORIDE, SODIUM LACTATE, POTASSIUM CHLORIDE, CALCIUM CHLORIDE 600; 310; 30; 20 MG/100ML; MG/100ML; MG/100ML; MG/100ML
INJECTION, SOLUTION INTRAVENOUS CONTINUOUS
Status: DISCONTINUED | OUTPATIENT
Start: 2023-04-19 | End: 2023-04-19 | Stop reason: HOSPADM

## 2023-04-19 RX ORDER — BUPIVACAINE HYDROCHLORIDE 5 MG/ML
INJECTION, SOLUTION EPIDURAL; INTRACAUDAL
Status: COMPLETED | OUTPATIENT
Start: 2023-04-19 | End: 2023-04-19

## 2023-04-19 RX ORDER — ONDANSETRON 2 MG/ML
INJECTION INTRAMUSCULAR; INTRAVENOUS PRN
Status: DISCONTINUED | OUTPATIENT
Start: 2023-04-19 | End: 2023-04-19 | Stop reason: SDUPTHER

## 2023-04-19 RX ORDER — MEPERIDINE HYDROCHLORIDE 50 MG/ML
12.5 INJECTION INTRAMUSCULAR; INTRAVENOUS; SUBCUTANEOUS EVERY 5 MIN PRN
Status: DISCONTINUED | OUTPATIENT
Start: 2023-04-19 | End: 2023-04-19 | Stop reason: HOSPADM

## 2023-04-19 RX ADMIN — APREPITANT 40 MG: 40 CAPSULE ORAL at 11:52

## 2023-04-19 RX ADMIN — ROCURONIUM BROMIDE 50 MG: 10 INJECTION, SOLUTION INTRAVENOUS at 13:50

## 2023-04-19 RX ADMIN — Medication 4 MG: at 14:52

## 2023-04-19 RX ADMIN — LIDOCAINE HYDROCHLORIDE 40 MG: 10 INJECTION, SOLUTION INFILTRATION; PERINEURAL at 13:50

## 2023-04-19 RX ADMIN — GLYCOPYRROLATE 0.6 MG: 0.2 INJECTION INTRAMUSCULAR; INTRAVENOUS at 14:52

## 2023-04-19 RX ADMIN — PROPOFOL 200 MG: 10 INJECTION, EMULSION INTRAVENOUS at 13:50

## 2023-04-19 RX ADMIN — ONDANSETRON 4 MG: 2 INJECTION INTRAMUSCULAR; INTRAVENOUS at 14:52

## 2023-04-19 RX ADMIN — DEXAMETHASONE SODIUM PHOSPHATE 10 MG: 10 INJECTION, SOLUTION INTRAMUSCULAR; INTRAVENOUS at 14:06

## 2023-04-19 RX ADMIN — DIPHENHYDRAMINE HYDROCHLORIDE 12.5 MG: 50 INJECTION, SOLUTION INTRAMUSCULAR; INTRAVENOUS at 14:06

## 2023-04-19 RX ADMIN — BUPIVACAINE 10 ML: 13.3 INJECTION, SUSPENSION, LIPOSOMAL INFILTRATION at 13:11

## 2023-04-19 RX ADMIN — Medication 100 MCG: at 14:42

## 2023-04-19 RX ADMIN — SODIUM CHLORIDE, POTASSIUM CHLORIDE, SODIUM LACTATE AND CALCIUM CHLORIDE: 600; 310; 30; 20 INJECTION, SOLUTION INTRAVENOUS at 14:48

## 2023-04-19 RX ADMIN — Medication 100 MCG: at 14:48

## 2023-04-19 RX ADMIN — SODIUM CHLORIDE, PRESERVATIVE FREE 20 MG: 5 INJECTION INTRAVENOUS at 11:54

## 2023-04-19 RX ADMIN — FENTANYL CITRATE 100 MCG: 50 INJECTION, SOLUTION INTRAMUSCULAR; INTRAVENOUS at 13:06

## 2023-04-19 RX ADMIN — BUPIVACAINE HYDROCHLORIDE 10 ML: 5 INJECTION, SOLUTION EPIDURAL; INTRACAUDAL; PERINEURAL at 13:11

## 2023-04-19 RX ADMIN — FENTANYL CITRATE 50 MCG: 50 INJECTION, SOLUTION INTRAMUSCULAR; INTRAVENOUS at 15:06

## 2023-04-19 RX ADMIN — MIDAZOLAM 2 MG: 1 INJECTION INTRAMUSCULAR; INTRAVENOUS at 13:06

## 2023-04-19 RX ADMIN — Medication 3000 MG: at 14:06

## 2023-04-19 RX ADMIN — SODIUM CHLORIDE, POTASSIUM CHLORIDE, SODIUM LACTATE AND CALCIUM CHLORIDE: 600; 310; 30; 20 INJECTION, SOLUTION INTRAVENOUS at 11:29

## 2023-04-19 RX ADMIN — FENTANYL CITRATE 50 MCG: 50 INJECTION, SOLUTION INTRAMUSCULAR; INTRAVENOUS at 13:50

## 2023-04-19 RX ADMIN — MIDAZOLAM HYDROCHLORIDE 2 MG: 2 INJECTION, SOLUTION INTRAMUSCULAR; INTRAVENOUS at 13:06

## 2023-04-19 ASSESSMENT — PAIN - FUNCTIONAL ASSESSMENT: PAIN_FUNCTIONAL_ASSESSMENT: 0-10

## 2023-04-19 ASSESSMENT — PAIN SCALES - GENERAL
PAINLEVEL_OUTOF10: 0
PAINLEVEL_OUTOF10: 0

## 2023-04-19 NOTE — DISCHARGE INSTRUCTIONS
Orthopedic Instructions:  -Weight bearing status: as tolerated left arm  -Sling until regional block wears off  -Keep dressing clean and dry. Ok to shower with dressing.  -Starting 3 days after surgery, Ok for daily dressing changes until wound is dry. Then leave open to air. if wound is no longer leaking, Ok to shower but no soaks or baths. -Ice (20 minutes on and off 1 hour) and elevate (above heart) as needed for swelling/pain  -Drink plenty of fluids.  -Call the office or come to Emergency Room if signs of infection appear (hot, swollen, red, draining pus, fever)  -Take medications as prescribed.  -Wean off narcotics (percocet/norco) as soon as possible. Do not take tylenol if still taking narcotics. -No alcoholic beverages or driving/operating machinery while on narcotics  -Follow up with Dr. Eloise Cristobal in his office in 10-14 days after surgery/discharge. Call Call [phone]  to schedule. Activity  You have had anesthesia today  Do not drive, operate heavy equipment, consume alcoholic beverages, or make any important decisions  for 24 hours   If you are taking pain medication: Do not drive or consume alcohol. Take your time changing positions today. You may feel light headed or dizzy if you move too quickly. Continue your home medications as ordered by your physician. Diet   You can eat your normal diet when you feel well. You should start off with bland foods like chicken soup, toast, or yogurt. Then advance as tolerated. Drink plenty of fluids (unless your doctor tells you not to). Your urine should be very lightly colored without a strong odor.

## 2023-04-19 NOTE — BRIEF OP NOTE
Brief Postoperative Note      Patient:  Fransisco Liz  YOB: 1985  MRN: [id number]    Date of Procedure: 4/19/2023    Pre-Op Diagnosis Codes:     * Impingement syndrome of left shoulder [M75.42]    Post-Op Diagnosis: Same       Procedure(s):  LEFT SHOULDER DIAGNOSTIC ARTHROSCOPY WITH SUBACROMIAL DECOMPRESSION    Surgeon(s):  Chavo Leiva MD    Assistant:  Resident: Roya Roca DO    Anesthesia: General, regional    Estimated Blood Loss (mL): <5    Complications: None    Specimens:   * No specimens in log *    Implants:  * No implants in log *      Drains: * No LDAs found *    Findings: See op note for details      Electronically signed by Roya Roca DO on 4/19/2023 at 3:06 PM

## 2023-04-19 NOTE — ANESTHESIA POSTPROCEDURE EVALUATION
Department of Anesthesiology  Postprocedure Note    Patient: Darius Kovacs  MRN: [id number]  YOB: 1985  Date of evaluation: 4/19/2023      Procedure Summary     Date: 04/19/23 Room / Location: [address]    Anesthesia Start: 7879 Anesthesia Stop: 7643    Procedure: LEFT SHOULDER diagnostic ARTHROSCOPIC SUBACROMIAL DECOMPRESSION (Left: Shoulder) Diagnosis:       Impingement syndrome of left shoulder      (Impingement syndrome of left shoulder [M75.42])    Surgeons: Jaciel Brandon MD Responsible Provider: La Bacon MD    Anesthesia Type: general, regional ASA Status: 2          Anesthesia Type: No value filed.     Dinh Phase I: Dinh Score: 8    Dinh Phase II:        Anesthesia Post Evaluation    Patient location during evaluation: PACU  Patient participation: complete - patient participated  Level of consciousness: awake and alert  Airway patency: patent  Nausea & Vomiting: no nausea and no vomiting  Complications: no  Cardiovascular status: hemodynamically stable  Respiratory status: room air and spontaneous ventilation  Hydration status: euvolemic  Multimodal analgesia pain management approach

## 2023-04-19 NOTE — ANESTHESIA PROCEDURE NOTES
Peripheral Block    Patient location during procedure: pre-op  Reason for block: post-op pain management and at surgeon's request  Start time: 4/19/2023 1:10 PM  End time: 4/19/2023 1:11 PM  Staffing  Performed: anesthesiologist   Anesthesiologist: Jesus Campos MD  Preanesthetic Checklist  Completed: patient identified, IV checked, site marked, risks and benefits discussed, surgical/procedural consents, equipment checked, pre-op evaluation, timeout performed, anesthesia consent given, oxygen available, monitors applied/VS acknowledged, fire risk safety assessment completed and verbalized and blood product R/B/A discussed and consented  Peripheral Block   Patient position: sitting  Prep: ChloraPrep  Provider prep: mask and sterile gloves  Patient monitoring: cardiac monitor, continuous pulse ox and frequent blood pressure checks  Block type: Brachial plexus  Interscalene  Laterality: left  Injection technique: single-shot  Guidance: nerve stimulator and ultrasound guided    Needle   Needle type: insulated echogenic nerve stimulator needle   Needle gauge: 22 G  Needle localization: anatomical landmarks, nerve stimulator and ultrasound guidance  Needle length: 2 IN.   Assessment   Injection assessment: negative aspiration for heme, no paresthesia on injection, local visualized surrounding nerve on ultrasound and no intravascular symptoms  Paresthesia pain: none  Slow fractionated injection: yes  Hemodynamics: stableno  Outcomes: patient tolerated procedure well    Additional Notes  (+) LEFT DELTOID TWITCH FROM 1.5MA DOWN TO 0.7MA  Medications Administered  bupivacaine (MARCAINE) PF injection 0.5% - Perineural   10 mL - 4/19/2023 1:11:00 PM  bupivacaine liposome (EXPAREL) injection 1.3% - Perineural   10 mL - 4/19/2023 1:11:00 PM

## 2023-04-19 NOTE — ANESTHESIA PRE PROCEDURE
UNKNOWN)      TUBAL LIGATION      ULNAR NERVE REPAIR  10/2022    decompression       Social History:    Social History     Tobacco Use    Smoking status: Never    Smokeless tobacco: Never    Tobacco comments:     uses vape   Substance Use Topics    Alcohol use: No                                Counseling given: Not Answered  Tobacco comments: uses vape      Vital Signs (Current):   Vitals:    04/11/23 1126   Height: 5' 3\" (1.6 m)                                              BP Readings from Last 3 Encounters:   04/06/23 (!) 131/90   03/30/23 (!) 156/105   10/19/22 130/82       NPO Status:                                                                                 BMI:   Wt Readings from Last 3 Encounters:   04/06/23 276 lb (125.2 kg)   03/30/23 278 lb (126.1 kg)   10/19/22 248 lb (112.5 kg)     Body mass index is 48.89 kg/m². CBC:   Lab Results   Component Value Date/Time    WBC 10.5 08/25/2022 12:01 PM    RBC 4.57 04/19/2022 11:19 AM    HGB 14.2 04/19/2022 11:19 AM    HCT 42.8 04/19/2022 11:19 AM    MCV 93.7 04/19/2022 11:19 AM    RDW 12.8 04/19/2022 11:19 AM     04/19/2022 11:19 AM       CMP:   Lab Results   Component Value Date/Time     04/19/2022 11:19 AM    K 4.3 04/19/2022 11:19 AM     04/19/2022 11:19 AM    CO2 22 04/19/2022 11:19 AM    BUN 10 04/19/2022 11:19 AM    CREATININE 0.61 04/19/2022 11:19 AM    GFRAA >60 04/19/2022 11:19 AM    LABGLOM >60 04/19/2022 11:19 AM    GLUCOSE 87 04/19/2022 11:19 AM    PROT 7.8 08/25/2022 12:01 PM    CALCIUM 9.3 04/19/2022 11:19 AM    BILITOT 0.92 04/19/2022 11:19 AM    ALKPHOS 92 04/19/2022 11:19 AM    AST 21 04/19/2022 11:19 AM    ALT 35 04/19/2022 11:19 AM       POC Tests: No results for input(s): POCGLU, POCNA, POCK, POCCL, POCBUN, POCHEMO, POCHCT in the last 72 hours.     Coags: No results found for: PROTIME, INR, APTT    HCG (If Applicable):   Lab Results   Component Value Date    PREGTESTUR NEGATIVE 03/19/2022    HCG NEGATIVE

## 2023-04-19 NOTE — H&P
Surgical History and Physical Exam    Reason for surgery:  The patient is a RHD 40 y.o. female with chronic left shoulder pain since Aug 2021 after fall. Pt has failed non-op management. She is here for left shoulder arthroscopy with subacromial decompression, possible rotator cuff debridement vs repair, possible biceps tenotomy vs tenodesis. Past Medical History:    Past Medical History:   Diagnosis Date    Anxiety     bipolar    Back pain     herniated disc    Hyperlipidemia     Hypertension     no medications, states it is only high at dr chakraborty    Kidney infection     Kidney stone     Migraines     Obesity     Ovarian cyst     Prediabetes     on Metformin    Prolonged emergence from general anesthesia     states has woken up during surgery in past (hyst)    Syncope      Past Surgical History:    Past Surgical History:   Procedure Laterality Date    DILATION AND CURETTAGE OF UTERUS      ENDOMETRIAL ABLATION      HYSTERECTOMY (CERVIX STATUS UNKNOWN)      TUBAL LIGATION      ULNAR NERVE REPAIR  10/2022    decompression     Medications Prior to Admission:   Prior to Admission medications    Medication Sig Start Date End Date Taking? Authorizing Provider   FLUoxetine (PROZAC) 10 MG capsule TAKE 1 CAPSULE BY MOUTH IN THE MORNING (WITH 20MG TO TOTAL 30MG) 3/28/23   Historical Provider, MD   FLUoxetine (PROZAC) 20 MG capsule TAKE 1 CAPSULE BY MOUTH IN THE MORNING . TAKE WITH 10MG FOR A TOTAL OF 30 MG DAILY  Patient not taking: Reported on 4/19/2023 3/31/23   Historical Provider, MD   hydrOXYzine HCl (ATARAX) 50 MG tablet TAKE ONE TABLET BY MOUTH AT BEDTIME AS NEEDED FOR INSOMNIA, CAN INCREASE TO ONE AND ONE-HALF TABLETS AT BEDTIME AS NEEDED AND TOLERATED 4/3/23   Historical Provider, MD   OLANZapine (ZYPREXA) 15 MG tablet Take 1 tablet by mouth nightly at bedtime. 4/3/23   Historical Provider, MD   traZODone (DESYREL) 50 MG tablet Take 1 tablet by mouth nightly at bedtime.  3/12/23   Historical Provider, MD

## 2023-04-24 NOTE — PROGRESS NOTES
[address]  [address] [address]  Dept: [phone]  Dept Fax: [phone]    Outpatient Followup Note    Jaylyn Saldaña is a 40y.o.-year-old female presenting for follow up of left upper limb pain. HPI:     She was last seen on 10/19/22 for follow up of left upper limb pain. Since that time, she reports improvement in numbness/tingling in the left upper limb following left ulnar nerve decompression on 10/17/22. She also notes that some of her proximal left upper limb pain has gotten better. However, she continues to note left shoulder pain which is about the same overall. She states that the pain is worst with overhead activities and with internal rotation of the left shoulder. She reports that she completed some physical therapy for the left elbow post-operatively but states that it aggravated the left shoulder pain. She denies any significant neck pain. She states that she is scheduled for surgery on the left shoulder in a couple of weeks. She notes that the plan is for about 6 weeks of physical therapy after the surgical procedure is completed.       Past Medical History:   Diagnosis Date    Anxiety     bipolar    Back pain     herniated disc    Hyperlipidemia     Hypertension     no medications, states it is only high at dr chakraborty    Kidney infection     Kidney stone     Migraines     Obesity     Ovarian cyst     Prediabetes     on Metformin    Prolonged emergence from general anesthesia     states has woken up during surgery in past (hyst)    Syncope       Past Surgical History:   Procedure Laterality Date    DILATION AND CURETTAGE OF UTERUS      ENDOMETRIAL ABLATION      HYSTERECTOMY (CERVIX STATUS UNKNOWN)      SHOULDER ARTHROSCOPY Left 04/19/2023    LEFT SHOULDER ARTHROSCOPIC SUBACROMIAL DECOMPRESSION    SHOULDER ARTHROSCOPY Left 4/19/2023    LEFT SHOULDER

## 2023-04-25 NOTE — OP NOTE
Operative Note      Patient: Una Stroud   YOB: 1985   MRN: [id number]     Date of Procedure: 4/19/2023      Pre-Op Diagnosis: Impingement syndrome of left shoulder [M75.42]      Post-Op Diagnosis: Same       Procedure(s):  Procedure(s):  LEFT SHOULDER diagnostic ARTHROSCOPIC SUBACROMIAL DECOMPRESSION      Surgeon(s):  Gunnar Mortimer, MD    Assistant:   None    Anesthesia: General      Estimated Blood Loss (mL): Minimal    Complications: None    Specimens: None    Implants:None      Drains: None    Findings: Noted was that there was no significant arthritis. Was there any fraying labrum. Biceps tendon also appeared normal.  Rotator cuff was also preserved. Examination of cervical space revealed that there was some hypertrophied synovium but no significant bone spur. Type II acromion was consistent with the operative findings. Detailed Description of Procedure:   Under general static, was placed in the beachchair position and the left shoulder was prepped and draped for the proposed procedure. Initially a portal camera was established and a trocar introduced whereby operative findings were noted as above. Once the joint was visualized noted to be essentially normal elected to proceed with the planned surgical decompression. The camera was then redirected to subacromial space. Tristin Monet was then introduced with ArthroCare wand through lateral portals and bursectomy performed followed with resecting the intraoperative acromion five-point technique the temporal/lateral aspect with the ablator coming from the posterior direction. a complete decompression was performed. Lidocaine was visualized to be intact from the bursal side as well. The portal sites were then closed with a 3-0 nylon instructions were applied.     Electronically signed by Veronica Jauregui MD on 2/24/2021 at 1:13 PM

## 2023-05-02 NOTE — TELEPHONE ENCOUNTER
Please advise patient that weight loss medications are not covered by insurance.     Guerline Araujo

## 2023-05-16 ENCOUNTER — HOSPITAL ENCOUNTER (OUTPATIENT)
Dept: PHYSICAL THERAPY | Facility: CLINIC | Age: 38
Setting detail: THERAPIES SERIES
Discharge: HOME | End: 2023-05-16

## 2023-05-16 NOTE — FLOWSHEET NOTE
[] [address]  [address]    [phone]  F: [phone]   [] [address] [facility] 36   [address]  P: [phone]  F: [phone]  [] [address]  [address] [facility]  P: [phone]  F: [phone] [x] [address]  P: [phone]  F: [phone]  [] [address] [facility]   [address]   [place]: [phone]  F: [phone]   [] [address]  [place]: [phone]   F: [phone]     Physical Therapy Cancel/No Show note    Date: 2023  Patient:  Osbaldo Escamilla  : 1985  MRN: [id number]    Cancels/No Shows to date:     For today's appointment patient:    [x]  Cancelled    [] Rescheduled appointment    [] No-show     Reason given by patient:    [x]  Patient ill    []  Conflicting appointment    [] No transportation      [] Conflict with work    [] No reason given    [] Weather related    [] [id number]    [] Other:      Comments:        [x] Next appointment was confirmed    Electronically signed by: Shannan Sykes PTA

## 2023-05-22 ENCOUNTER — HOSPITAL ENCOUNTER (OUTPATIENT)
Dept: PHYSICAL THERAPY | Facility: CLINIC | Age: 38
Setting detail: THERAPIES SERIES
Discharge: HOME | End: 2023-05-22
Payer: COMMERCIAL

## 2023-05-22 PROCEDURE — 97110 THERAPEUTIC EXERCISES: CPT

## 2023-05-22 PROCEDURE — 97161 PT EVAL LOW COMPLEX 20 MIN: CPT

## 2023-05-22 PROCEDURE — 97016 VASOPNEUMATIC DEVICE THERAPY: CPT

## 2023-05-22 NOTE — CONSULTS
[] [address]  [address]  [phone]  F: [phone] [] [address]  [place] [address]   [address]  P: [phone]  F: [phone] [] [place]  [address] [facility]  P: [phone]  F: [phone] [] [address]  [facility]   [address]   [place]: [phone]  F: [phone]  [x] [address]: [phone]  F: [phone]      Physical Therapy Upper Extremity Evaluation    Date:  2023  Patient: Elvis Ng  :  1985  MRN: [id number]  Physician: Dr. Wallace Savers: Workers Comp (jesi Singh)  Medical Diagnosis: L shoulder post op  Rehab Codes: M25.512  Onset date: 23- DOS  Next Dr's appt.:     Subjective:   CC: L shoulder chronic pain, post op subacromial decompression on . Pt states since surgery is able to move L shoulder slightly more, still difficult lifting overhead. Pain is located located right at surgery site. Denies any residual N/T.     HPI: Chronic nature of L shoulder pain, initially began from a fall at work. Since has tried PT without resolving symptoms. Pt also had ulnar nerve entrapment with subsequent decompression surgery; this did relieve radicular symptoms.      PMHx: [] Unremarkable [] Diabetes [] HTN  [] Pacemaker   [] MI/Heart Problems [] Cancer [] Arthritis [] Other:              [x] Refer to full medical chart  In EPIC       Tests: [] X-Ray: [] MRI:  [] Other:    Medications: [x] Refer to full medical record [] None [] Other:  Allergies:      [x] Refer to full medical record [] None [] Other:        Marital Status    Home type    Stairs from outside            Hand rail    Stairs inside            [employer] rail    Employment [employer]- line

## 2023-05-25 ENCOUNTER — HOSPITAL ENCOUNTER (OUTPATIENT)
Dept: PHYSICAL THERAPY | Facility: CLINIC | Age: 38
Setting detail: THERAPIES SERIES
Discharge: HOME | End: 2023-05-25
Payer: COMMERCIAL

## 2023-05-25 PROCEDURE — 97110 THERAPEUTIC EXERCISES: CPT

## 2023-05-25 PROCEDURE — 97016 VASOPNEUMATIC DEVICE THERAPY: CPT

## 2023-05-25 NOTE — FLOWSHEET NOTE
[] [facility]  [address]  [phone]  F: [phone] [] [address]  [facility] 36   [address]  P: [phone]  F: [phone] [] [place] [facility]  [address] [facility]  P: [phone]  F: [phone] [x] [address]  P: [phone]  F: [phone] [] [address]  [facility]   [address]   [place]: [phone]  F: [phone]      Physical Therapy Daily Treatment Note    Date:  2023  Patient Name:  Bea Hale    :  1985  MRN: [id number]  Physician: Dr. Harish Luz: Workers Comp (presumptive Carol Garibay)  Medical Diagnosis: L shoulder post op                    Rehab Codes: M25.512  Onset date: 23- DOS                 Next 's appt. :  6/15/23    Visit# / total visits:     Cancels/No Shows: 0/0    Subjective:  Patient states she was sore starting about 1 hour after her eval.  She states she shoulder pain has been making it difficult to sleep. Pain:  [x] Yes  [] No Location: left shoulder Pain Rating: (0-10 scale) 9/10    Pain altered Tx:  [] No  [x] Yes  Action: Held all exercises, focused on PROM and vaso instead   Comments:    Objective:     Todays Treatment:     Exercises:  Exercise  L Shoulder post op    Reps/ Time Weight/ Level Comments             Shoulder flex and abd wall slide 10x    not today    Shoulder flex and abd isometric 10x5\" hold   Only able to tolerate 5 reps of abd ( not today )              Supine wand flex and abd 10x  1#  ( not today )                        PROM x    painfree ROM                                                                          Other:     Vasocompression: 15' low compression L shoulder seated, 34

## 2023-05-30 ENCOUNTER — HOSPITAL ENCOUNTER (OUTPATIENT)
Dept: PHYSICAL THERAPY | Facility: CLINIC | Age: 38
Setting detail: THERAPIES SERIES
Discharge: HOME | End: 2023-05-30
Payer: COMMERCIAL

## 2023-05-30 PROCEDURE — 97016 VASOPNEUMATIC DEVICE THERAPY: CPT

## 2023-05-30 PROCEDURE — 97110 THERAPEUTIC EXERCISES: CPT

## 2023-05-30 NOTE — FLOWSHEET NOTE
[] [facility] [address]  [address]  [phone]  F: [phone] [] [address]  [facility] 36   [address]  P: [phone]  F: [phone] [] [address]  [address] [facility]  P: [phone]  F: [phone] [x] [address]  P: [phone]  F: [phone] [] [address]  [facility]   [address]   [place]: [phone]  F: [phone]      Physical Therapy Daily Treatment Note    Date:  2023  Patient Name:  Vito Burgess    :  1985  MRN: [id number]  Physician: Dr. Erica Mckeon: Workers Comp (presumptive Rosellen Seeds)  Medical Diagnosis: L shoulder post op                    Rehab Codes: M25.512  Onset date: 23- DOS                 Next Dr's appt. :  6/15/23    Visit# / total visits: 3/20    Cancels/No Shows: 0/0    Subjective:   Pain:  [x] Yes  [] No Location: left shoulder Pain Rating: (0-10 scale) 8/10    Pain altered Tx:  [x] No  [] Yes  Action:   Comments: Patient notes she is still having high pain levels causing it very hard for her to sleep at night. She notes she has rested her shoulder the past couple days due to high pain. Patient states \" I think it is just too early after surgery to do physical therapy. \"     Objective:     Todays Treatment:     Exercises:  Exercise  L Shoulder post op    Reps/ Time Weight/ Level Comments             Shoulder flex and abd wall slide 10x    with instruction to keep arm close to body ( patient tends to extend arm too far out  causing increased pain when performing exerise )    Shoulder flex and abd isometric 10x5\" hold   Instructed to perform painfree             Supine wand flex and abd 10x   10x                        PROM x

## 2023-06-01 ENCOUNTER — HOSPITAL ENCOUNTER (OUTPATIENT)
Dept: PHYSICAL THERAPY | Facility: CLINIC | Age: 38
Setting detail: THERAPIES SERIES
Discharge: HOME | End: 2023-06-01
Payer: COMMERCIAL

## 2023-06-01 PROCEDURE — 97110 THERAPEUTIC EXERCISES: CPT

## 2023-06-01 PROCEDURE — 97016 VASOPNEUMATIC DEVICE THERAPY: CPT

## 2023-06-01 NOTE — FLOWSHEET NOTE
[] [address]  [address]  [phone]  F: [phone] [] [address]  [facility]   [address]  P: [phone]  F: [phone] [] [address]  [address] [facility] [address]  P: [phone]  F: [phone] [x] [address]  P: [phone]  F: [phone] [] [address]  [facility]   [address]   [place]: [phone]  F: [phone]      Physical Therapy Daily Treatment Note    Date:  2023  Patient Name:  Temi Bell    :  1985  MRN: [id number]  Physician: Dr. Dickinson Prudent: Workers Comp (presumptive Meenu Antis)  Medical Diagnosis: L shoulder post op                    Rehab Codes: M25.512  Onset date: 23- DOS                 Next Dr's appt. :  6/15/23    Visit# / total visits:     Cancels/No Shows: 0/0    Subjective:   Pain:  [x] Yes  [] No Location: left shoulder Pain Rating: (0-10 scale) 7-8/10    Pain altered Tx:  [] No  [x] Yes  Action:  Began with MHP for 5'  Comments: Patient arrives noting that shoulder continues to be extremly painful, very sore on terapy days. Objective:     Todays Treatment:     Exercises:  Exercise  L Shoulder post op    Reps/ Time Weight/ Level Comments             Shoulder flex and abd wall slide 10x    with instruction to keep arm close to body ( patient tends to extend arm too far out  causing increased pain when performing exerise )    Shoulder flex and abd isometric 10x5\" hold   Instructed to perform painfree             Supine wand flex and abd 10x   10x                        PROM x    painfree ROM              standing A shoulder flexion   10x   at mirror with correction of UT compensation              Reviewed HEP tech   x

## 2023-06-05 ENCOUNTER — HOSPITAL ENCOUNTER (OUTPATIENT)
Dept: PHYSICAL THERAPY | Facility: CLINIC | Age: 38
Setting detail: THERAPIES SERIES
Discharge: HOME | End: 2023-06-05
Payer: COMMERCIAL

## 2023-06-05 PROCEDURE — 97016 VASOPNEUMATIC DEVICE THERAPY: CPT

## 2023-06-05 PROCEDURE — 97110 THERAPEUTIC EXERCISES: CPT

## 2023-06-05 NOTE — FLOWSHEET NOTE
Education:  [x] Yes  [] No  [x] Reviewed Prior HEP/Ed  Method of Education: [] Verbal  [] Demo  [] Written  Comprehension of Education:  [x] Verbalizes understanding. [] Demonstrates understanding. [x] Needs review. [x] Demonstrates/verbalizes HEP/Ed previously given. Access Code: [email or web address]  URL: [employer]/  Date: 50/55/7739  Prepared by: Conor Carrillo     Exercises  - Shoulder Flexion Wall Slide with Towel  - 1 x daily - 7 x weekly - 2 sets - 10 reps  - Standing Shoulder Abduction Wall Slide with Thumb Out  - 1 x daily - 7 x weekly - 2 sets - 10 reps  - Supine Shoulder Abduction AAROM with Dowel  - 1 x daily - 7 x weekly - 2 sets - 10 reps  - Supine Shoulder Flexion Extension AAROM with Dowel  - 1 x daily - 7 x weekly - 2 sets - 10 reps  - Isometric Shoulder Flexion at Wall  - 1 x daily - 7 x weekly - 2 sets - 10 reps - 5\" hold  - Isometric Shoulder Abduction at Wall  - 1 x daily - 7 x weekly - 2 sets - 10 reps - 5\" hold    Plan: [x] Continue current frequency toward long and short term goals. [x] Specific Instructions for subsequent treatments: Focus on restoring ROM prior to advancing to strengthening, initiate AROM in front of mirror to watch for UT compensation.  Once ROM improved can begin tband strengthening per protocol         Time In: 2:00  PM         Time Out: 2:55 PM    Electronically signed by:  Eliza Caruso PTA

## 2023-06-08 ENCOUNTER — APPOINTMENT (OUTPATIENT)
Dept: PHYSICAL THERAPY | Facility: CLINIC | Age: 38
End: 2023-06-08
Payer: COMMERCIAL

## 2023-06-09 ENCOUNTER — HOSPITAL ENCOUNTER (OUTPATIENT)
Dept: PHYSICAL THERAPY | Facility: CLINIC | Age: 38
Setting detail: THERAPIES SERIES
Discharge: HOME | End: 2023-06-09
Payer: COMMERCIAL

## 2023-06-09 PROCEDURE — 97016 VASOPNEUMATIC DEVICE THERAPY: CPT

## 2023-06-09 PROCEDURE — 97110 THERAPEUTIC EXERCISES: CPT

## 2023-06-09 NOTE — FLOWSHEET NOTE
Education:  [x] Verbalizes understanding. [] Demonstrates understanding. [x] Needs review. [x] Demonstrates/verbalizes HEP/Ed previously given. Access Code: [email or web address]  URL: [email or web address]/  Date: 33/42/8219  Prepared by: Sharath Watts     Exercises  - Shoulder Flexion Wall Slide with Towel  - 1 x daily - 7 x weekly - 2 sets - 10 reps  - Standing Shoulder Abduction Wall Slide with Thumb Out  - 1 x daily - 7 x weekly - 2 sets - 10 reps  - Supine Shoulder Abduction AAROM with Dowel  - 1 x daily - 7 x weekly - 2 sets - 10 reps  - Supine Shoulder Flexion Extension AAROM with Dowel  - 1 x daily - 7 x weekly - 2 sets - 10 reps  - Isometric Shoulder Flexion at Wall  - 1 x daily - 7 x weekly - 2 sets - 10 reps - 5\" hold  - Isometric Shoulder Abduction at Wall  - 1 x daily - 7 x weekly - 2 sets - 10 reps - 5\" hold    Plan: [x] Continue current frequency toward long and short term goals. [x] Specific Instructions for subsequent treatments: Focus on restoring ROM prior to advancing to strengthening, initiate AROM in front of mirror to watch for UT compensation.  Once ROM improved can begin tband strengthening per protocol         Time In: 3:00  PM         Time Out:  3: 55 PM    Electronically signed by:  Sp Gurrola PTA

## 2023-07-05 ENCOUNTER — OFFICE VISIT (OUTPATIENT)
Dept: PRIMARY CARE CLINIC | Age: 38
End: 2023-07-05
Payer: COMMERCIAL

## 2023-07-05 ENCOUNTER — HOSPITAL ENCOUNTER (OUTPATIENT)
Dept: CT IMAGING | Age: 38
Discharge: HOME | End: 2023-07-07
Payer: COMMERCIAL

## 2023-07-05 VITALS
DIASTOLIC BLOOD PRESSURE: 84 MMHG | WEIGHT: 292 LBS | BODY MASS INDEX: 51.74 KG/M2 | OXYGEN SATURATION: 97 % | HEART RATE: 80 BPM | HEIGHT: 63 IN | SYSTOLIC BLOOD PRESSURE: 124 MMHG | RESPIRATION RATE: 16 BRPM

## 2023-07-05 DIAGNOSIS — R35.0 URINARY FREQUENCY: ICD-10-CM

## 2023-07-05 DIAGNOSIS — R10.9 RIGHT FLANK PAIN: ICD-10-CM

## 2023-07-05 DIAGNOSIS — R31.9 HEMATURIA, UNSPECIFIED TYPE: ICD-10-CM

## 2023-07-05 DIAGNOSIS — R10.9 RIGHT FLANK PAIN: Primary | ICD-10-CM

## 2023-07-05 PROBLEM — M54.50 LOW BACK PAIN, UNSPECIFIED: Status: ACTIVE | Noted: 2022-11-16

## 2023-07-05 LAB
BILIRUBIN, POC: NORMAL
BLOOD URINE, POC: NORMAL
CLARITY, POC: NORMAL
COLOR, POC: YELLOW
GLUCOSE URINE, POC: NORMAL
KETONES, POC: NORMAL
LEUKOCYTE EST, POC: NORMAL
NITRITE, POC: NORMAL
PH, POC: 5.5
PROTEIN, POC: NORMAL
SPECIFIC GRAVITY, POC: 1.01
UROBILINOGEN, POC: 0.2

## 2023-07-05 PROCEDURE — 99214 OFFICE O/P EST MOD 30 MIN: CPT | Performed by: PHYSICIAN ASSISTANT

## 2023-07-05 PROCEDURE — 1036F TOBACCO NON-USER: CPT | Performed by: PHYSICIAN ASSISTANT

## 2023-07-05 PROCEDURE — G8417 CALC BMI ABV UP PARAM F/U: HCPCS | Performed by: PHYSICIAN ASSISTANT

## 2023-07-05 PROCEDURE — 96372 THER/PROPH/DIAG INJ SC/IM: CPT | Performed by: PHYSICIAN ASSISTANT

## 2023-07-05 PROCEDURE — 81002 URINALYSIS NONAUTO W/O SCOPE: CPT | Performed by: PHYSICIAN ASSISTANT

## 2023-07-05 PROCEDURE — 74176 CT ABD & PELVIS W/O CONTRAST: CPT

## 2023-07-05 PROCEDURE — G8427 DOCREV CUR MEDS BY ELIG CLIN: HCPCS | Performed by: PHYSICIAN ASSISTANT

## 2023-07-05 RX ORDER — KETOROLAC TROMETHAMINE 30 MG/ML
30 INJECTION, SOLUTION INTRAMUSCULAR; INTRAVENOUS ONCE
Status: COMPLETED | OUTPATIENT
Start: 2023-07-05 | End: 2023-07-05

## 2023-07-05 RX ADMIN — KETOROLAC TROMETHAMINE 30 MG: 30 INJECTION, SOLUTION INTRAMUSCULAR; INTRAVENOUS at 11:42

## 2023-07-05 ASSESSMENT — PATIENT HEALTH QUESTIONNAIRE - PHQ9
3. TROUBLE FALLING OR STAYING ASLEEP: 1
SUM OF ALL RESPONSES TO PHQ QUESTIONS 1-9: 5
6. FEELING BAD ABOUT YOURSELF - OR THAT YOU ARE A FAILURE OR HAVE LET YOURSELF OR YOUR FAMILY DOWN: 0
SUM OF ALL RESPONSES TO PHQ QUESTIONS 1-9: 5
SUM OF ALL RESPONSES TO PHQ QUESTIONS 1-9: 5
SUM OF ALL RESPONSES TO PHQ9 QUESTIONS 1 & 2: 2
8. MOVING OR SPEAKING SO SLOWLY THAT OTHER PEOPLE COULD HAVE NOTICED. OR THE OPPOSITE, BEING SO FIGETY OR RESTLESS THAT YOU HAVE BEEN MOVING AROUND A LOT MORE THAN USUAL: 0
4. FEELING TIRED OR HAVING LITTLE ENERGY: 1
9. THOUGHTS THAT YOU WOULD BE BETTER OFF DEAD, OR OF HURTING YOURSELF: 0
10. IF YOU CHECKED OFF ANY PROBLEMS, HOW DIFFICULT HAVE THESE PROBLEMS MADE IT FOR YOU TO DO YOUR WORK, TAKE CARE OF THINGS AT HOME, OR GET ALONG WITH OTHER PEOPLE: 1
1. LITTLE INTEREST OR PLEASURE IN DOING THINGS: 1
5. POOR APPETITE OR OVEREATING: 1
7. TROUBLE CONCENTRATING ON THINGS, SUCH AS READING THE NEWSPAPER OR WATCHING TELEVISION: 0
2. FEELING DOWN, DEPRESSED OR HOPELESS: 1
SUM OF ALL RESPONSES TO PHQ QUESTIONS 1-9: 5

## 2023-07-06 ENCOUNTER — PATIENT MESSAGE (OUTPATIENT)
Dept: PRIMARY CARE CLINIC | Age: 38
End: 2023-07-06

## 2023-07-06 DIAGNOSIS — E88.81 INSULIN RESISTANCE: Primary | ICD-10-CM

## 2023-07-06 NOTE — TELEPHONE ENCOUNTER
From: Thea Bernard  To: Roger Mead  Sent: 7/6/2023 2:59 PM EDT  Subject: Glucose test    Would it be possible to have a lab order to check my fasted sugar levels. I'm concerned that I'm no longer borderline sure to weight gain. Thank you. [facility]

## 2023-07-11 ASSESSMENT — ENCOUNTER SYMPTOMS
RHINORRHEA: 0
BACK PAIN: 0
VOMITING: 0
SHORTNESS OF BREATH: 0
ABDOMINAL PAIN: 0
SINUS PAIN: 0
COUGH: 0
CONSTIPATION: 0
DIARRHEA: 0
NAUSEA: 0

## 2023-07-25 ENCOUNTER — PATIENT MESSAGE (OUTPATIENT)
Dept: PRIMARY CARE CLINIC | Age: 38
End: 2023-07-25

## 2023-07-25 DIAGNOSIS — G43.709 CHRONIC MIGRAINE WITHOUT AURA WITHOUT STATUS MIGRAINOSUS, NOT INTRACTABLE: Primary | ICD-10-CM

## 2023-07-25 NOTE — TELEPHONE ENCOUNTER
From: Delma Hinojosa  To: Edwin Amoruth: 7/25/2023 11:39 AM EDT  Subject: Migraine    Would it be possible to have a prescription sent in for Reglan since insurance won't cover ubrelvy for me. I've had a migraine for four days now and I've taken it in the past. Thank you.

## 2023-07-31 DIAGNOSIS — R51.9 NONINTRACTABLE HEADACHE, UNSPECIFIED CHRONICITY PATTERN, UNSPECIFIED HEADACHE TYPE: Primary | ICD-10-CM

## 2023-08-17 ENCOUNTER — TELEMEDICINE (OUTPATIENT)
Dept: PRIMARY CARE CLINIC | Age: 38
End: 2023-08-17
Payer: COMMERCIAL

## 2023-08-17 DIAGNOSIS — R35.0 URINARY FREQUENCY: ICD-10-CM

## 2023-08-17 DIAGNOSIS — G43.709 CHRONIC MIGRAINE WITHOUT AURA WITHOUT STATUS MIGRAINOSUS, NOT INTRACTABLE: Primary | ICD-10-CM

## 2023-08-17 PROCEDURE — 99214 OFFICE O/P EST MOD 30 MIN: CPT | Performed by: PHYSICIAN ASSISTANT

## 2023-08-17 PROCEDURE — G8427 DOCREV CUR MEDS BY ELIG CLIN: HCPCS | Performed by: PHYSICIAN ASSISTANT

## 2023-08-17 RX ORDER — NITROFURANTOIN 25; 75 MG/1; MG/1
100 CAPSULE ORAL 2 TIMES DAILY
Qty: 10 CAPSULE | Refills: 0 | Status: SHIPPED | OUTPATIENT
Start: 2023-08-17 | End: 2023-08-22

## 2023-08-17 RX ORDER — RIMEGEPANT SULFATE 75 MG/75MG
75 TABLET, ORALLY DISINTEGRATING ORAL PRN
Qty: 16 TABLET | Refills: 0 | Status: SHIPPED | OUTPATIENT
Start: 2023-08-17

## 2023-08-17 ASSESSMENT — PATIENT HEALTH QUESTIONNAIRE - PHQ9
SUM OF ALL RESPONSES TO PHQ9 QUESTIONS 1 & 2: 5
4. FEELING TIRED OR HAVING LITTLE ENERGY: 3
SUM OF ALL RESPONSES TO PHQ QUESTIONS 1-9: 13
1. LITTLE INTEREST OR PLEASURE IN DOING THINGS: 2
8. MOVING OR SPEAKING SO SLOWLY THAT OTHER PEOPLE COULD HAVE NOTICED. OR THE OPPOSITE, BEING SO FIGETY OR RESTLESS THAT YOU HAVE BEEN MOVING AROUND A LOT MORE THAN USUAL: 1
9. THOUGHTS THAT YOU WOULD BE BETTER OFF DEAD, OR OF HURTING YOURSELF: 0
6. FEELING BAD ABOUT YOURSELF - OR THAT YOU ARE A FAILURE OR HAVE LET YOURSELF OR YOUR FAMILY DOWN: 1
5. POOR APPETITE OR OVEREATING: 0
2. FEELING DOWN, DEPRESSED OR HOPELESS: 3
10. IF YOU CHECKED OFF ANY PROBLEMS, HOW DIFFICULT HAVE THESE PROBLEMS MADE IT FOR YOU TO DO YOUR WORK, TAKE CARE OF THINGS AT HOME, OR GET ALONG WITH OTHER PEOPLE: 1
SUM OF ALL RESPONSES TO PHQ QUESTIONS 1-9: 13
SUM OF ALL RESPONSES TO PHQ QUESTIONS 1-9: 13
7. TROUBLE CONCENTRATING ON THINGS, SUCH AS READING THE NEWSPAPER OR WATCHING TELEVISION: 0
SUM OF ALL RESPONSES TO PHQ QUESTIONS 1-9: 13
3. TROUBLE FALLING OR STAYING ASLEEP: 3

## 2023-08-17 NOTE — PROGRESS NOTES
2023    TELEHEALTH EVALUATION -- Audio/Visual    HPI:    Lexus Franco (:  1985) has requested an audio/video evaluation for the following concern(s):    Patient presents as a virtual visit. For last 2 weeks she has been dealing with a fairly persistent migraine. She does have a history of migraines although they do not typically last this long. She has not been evaluated. No neurologist.  Migraine is associated with some blurred vision/light sensitivity and upset stomach. She has been using Tylenol and Excedrin with mild benefit. Typically her migraines are once every couple months. She has tried Imitrex in the past which caused significant side effects. No new neurological symptoms. She reports no new medication change from psychiatry for bipolar disorder. Additionally, patient was evaluated recently at urgent care for possible UTI. She describes continued urinary pressure, urgency, burning. She has used Azo with no relief. Urine culture appears contaminated from urgent care. Vitals unavailable. Review of Systems   Constitutional:  Negative for chills, fatigue and fever. HENT:  Negative for congestion, rhinorrhea and sinus pain. Eyes:  Positive for photophobia and visual disturbance. Respiratory:  Negative for cough and shortness of breath. Cardiovascular:  Negative for chest pain and leg swelling. Gastrointestinal:  Positive for nausea. Negative for abdominal pain, constipation, diarrhea and vomiting. Genitourinary:  Positive for dysuria and urgency. Negative for difficulty urinating, flank pain, frequency and menstrual problem. Musculoskeletal:  Negative for arthralgias, back pain and myalgias. Neurological:  Positive for headaches. Negative for dizziness. Psychiatric/Behavioral:  Negative for confusion, dysphoric mood and sleep disturbance. The patient is not nervous/anxious. All other systems reviewed and are negative.     Prior to Visit Medications

## 2023-08-18 ASSESSMENT — ENCOUNTER SYMPTOMS
COUGH: 0
DIARRHEA: 0
VOMITING: 0
SINUS PAIN: 0
CONSTIPATION: 0
NAUSEA: 1
BACK PAIN: 0
PHOTOPHOBIA: 1
RHINORRHEA: 0
ABDOMINAL PAIN: 0
SHORTNESS OF BREATH: 0

## 2023-08-22 ENCOUNTER — TELEPHONE (OUTPATIENT)
Dept: PRIMARY CARE CLINIC | Age: 38
End: 2023-08-22

## 2023-08-23 NOTE — TELEPHONE ENCOUNTER
PA for Nurtec denied, patient needs to try and fail a 14 day trial of TWO of the following medications:    Naratriptan  Rizatriptan  Sumatriptan    Please advise.

## 2023-08-24 NOTE — TELEPHONE ENCOUNTER
Per the patient's new patient appointment with PCP, She tried Imitrex in the past with side effects.

## 2023-09-07 RX ORDER — CYCLOBENZAPRINE HCL 10 MG
TABLET ORAL
Qty: 30 TABLET | Refills: 0 | Status: SHIPPED | OUTPATIENT
Start: 2023-09-07

## 2024-01-05 ENCOUNTER — HOSPITAL ENCOUNTER (EMERGENCY)
Facility: CLINIC | Age: 39
Discharge: HOME | End: 2024-01-05
Attending: EMERGENCY MEDICINE
Payer: COMMERCIAL

## 2024-01-05 ENCOUNTER — APPOINTMENT (OUTPATIENT)
Dept: GENERAL RADIOLOGY | Facility: CLINIC | Age: 39
End: 2024-01-05
Payer: COMMERCIAL

## 2024-01-05 VITALS
WEIGHT: 293 LBS | SYSTOLIC BLOOD PRESSURE: 151 MMHG | HEART RATE: 84 BPM | OXYGEN SATURATION: 97 % | TEMPERATURE: 98.1 F | HEIGHT: 63 IN | BODY MASS INDEX: 51.91 KG/M2 | RESPIRATION RATE: 18 BRPM | DIASTOLIC BLOOD PRESSURE: 82 MMHG

## 2024-01-05 DIAGNOSIS — R07.89 ATYPICAL CHEST PAIN: Primary | ICD-10-CM

## 2024-01-05 LAB
ANION GAP SERPL CALCULATED.3IONS-SCNC: 10 MMOL/L (ref 9–17)
BASOPHILS # BLD: 0 K/UL (ref 0–0.2)
BASOPHILS NFR BLD: 1 % (ref 0–2)
BUN SERPL-MCNC: 9 MG/DL (ref 6–20)
CALCIUM SERPL-MCNC: 9.7 MG/DL (ref 8.6–10.4)
CHLORIDE SERPL-SCNC: 100 MMOL/L (ref 98–107)
CO2 SERPL-SCNC: 27 MMOL/L (ref 20–31)
CREAT SERPL-MCNC: 0.5 MG/DL (ref 0.5–0.9)
EOSINOPHIL # BLD: 0.1 K/UL (ref 0–0.4)
EOSINOPHILS RELATIVE PERCENT: 1 % (ref 1–4)
ERYTHROCYTE [DISTWIDTH] IN BLOOD BY AUTOMATED COUNT: 14.1 % (ref 12.5–15.4)
FLUAV AG SPEC QL: NEGATIVE
FLUBV AG SPEC QL: NEGATIVE
GFR SERPL CREATININE-BSD FRML MDRD: >60 ML/MIN/1.73M2
GLUCOSE SERPL-MCNC: 121 MG/DL (ref 70–99)
HCT VFR BLD AUTO: 40.4 % (ref 36–46)
HGB BLD-MCNC: 13.6 G/DL (ref 12–16)
LYMPHOCYTES NFR BLD: 2.5 K/UL (ref 1–4.8)
LYMPHOCYTES RELATIVE PERCENT: 26 % (ref 24–44)
MCH RBC QN AUTO: 31 PG (ref 26–34)
MCHC RBC AUTO-ENTMCNC: 33.7 G/DL (ref 31–37)
MCV RBC AUTO: 91.9 FL (ref 80–100)
MONOCYTES NFR BLD: 0.5 K/UL (ref 0.1–1.2)
MONOCYTES NFR BLD: 5 % (ref 2–11)
NEUTROPHILS NFR BLD: 67 % (ref 36–66)
NEUTS SEG NFR BLD: 6.4 K/UL (ref 1.8–7.7)
PLATELET # BLD AUTO: 323 K/UL (ref 140–450)
PMV BLD AUTO: 8.2 FL (ref 6–12)
POTASSIUM SERPL-SCNC: 4.3 MMOL/L (ref 3.7–5.3)
RBC # BLD AUTO: 4.39 M/UL (ref 4–5.2)
SARS-COV-2 RDRP RESP QL NAA+PROBE: NOT DETECTED
SODIUM SERPL-SCNC: 137 MMOL/L (ref 135–144)
SPECIMEN DESCRIPTION: NORMAL
TROPONIN I SERPL HS-MCNC: <6 NG/L (ref 0–14)
WBC OTHER # BLD: 9.5 K/UL (ref 3.5–11)

## 2024-01-05 PROCEDURE — 36415 COLL VENOUS BLD VENIPUNCTURE: CPT

## 2024-01-05 PROCEDURE — 93005 ELECTROCARDIOGRAM TRACING: CPT | Performed by: PHYSICIAN ASSISTANT

## 2024-01-05 PROCEDURE — 85025 COMPLETE CBC W/AUTO DIFF WBC: CPT

## 2024-01-05 PROCEDURE — 71045 X-RAY EXAM CHEST 1 VIEW: CPT

## 2024-01-05 PROCEDURE — 99285 EMERGENCY DEPT VISIT HI MDM: CPT

## 2024-01-05 PROCEDURE — 87804 INFLUENZA ASSAY W/OPTIC: CPT

## 2024-01-05 PROCEDURE — 87635 SARS-COV-2 COVID-19 AMP PRB: CPT

## 2024-01-05 PROCEDURE — 84484 ASSAY OF TROPONIN QUANT: CPT

## 2024-01-05 PROCEDURE — 80048 BASIC METABOLIC PNL TOTAL CA: CPT

## 2024-01-05 ASSESSMENT — PAIN DESCRIPTION - DESCRIPTORS: DESCRIPTORS: ACHING;SHARP

## 2024-01-05 ASSESSMENT — PAIN DESCRIPTION - FREQUENCY: FREQUENCY: CONTINUOUS

## 2024-01-05 ASSESSMENT — PAIN SCALES - GENERAL: PAINLEVEL_OUTOF10: 9

## 2024-01-05 ASSESSMENT — PAIN DESCRIPTION - PAIN TYPE: TYPE: ACUTE PAIN

## 2024-01-05 ASSESSMENT — PAIN DESCRIPTION - LOCATION: LOCATION: CHEST

## 2024-01-05 NOTE — ED PROVIDER NOTES
EMERGENCY DEPARTMENT ENCOUNTER      Pt Name: Thania Taylor  MRN: [id number]  Birthdate 1985  Date of evaluation: 1/5/2024  Provider: Juanpablo Snyder PA-C    CHIEF COMPLAINT       Chief Complaint   Patient presents with    Chest Pain     Constant since last night. Pt reports she was laying down when it started. Pt also c/o nausea. Denies any other complaints at this time. PT sitting on cot. RR even and non labored. NAD noted at this time. Call light within reach.          HISTORY OF PRESENT ILLNESS      Thania Taylor is a 38 y.o. female who presents to the emergency department complaining of thorax and bilateral rib pain.  States that it started last night.  She denies any shortness of breath, she denies any cough, denies any fevers or chills.  She denies any abdominal pain nausea vomiting.  Denies any injury or falls.  Denies any radiation of symptoms.  Denies any back pain.          REVIEW OF SYSTEMS       Review of Systems   AS STATED IN HPI      PAST MEDICAL HISTORY     Past Medical History:   Diagnosis Date    Anxiety     bipolar    Back pain     herniated disc    Hyperlipidemia     Hypertension     no medications, states it is only high at dr chakraborty    Kidney infection     Kidney stone     Migraines     Obesity     Ovarian cyst     Prediabetes     on Metformin    Prolonged emergence from general anesthesia     states has woken up during surgery in past (hyst)    Syncope          SURGICAL HISTORY       Past Surgical History:   Procedure Laterality Date    DILATION AND CURETTAGE OF UTERUS      ENDOMETRIAL ABLATION      HYSTERECTOMY (CERVIX STATUS UNKNOWN)      SHOULDER ARTHROSCOPY Left 04/19/2023    LEFT SHOULDER ARTHROSCOPIC SUBACROMIAL DECOMPRESSION    SHOULDER ARTHROSCOPY Left 4/19/2023    LEFT SHOULDER diagnostic ARTHROSCOPIC SUBACROMIAL DECOMPRESSION performed by Tyra Palacios MD at [facility] OR    TUBAL LIGATION      ULNAR NERVE REPAIR  10/2022    decompression         CURRENT MEDICATIONS

## 2024-01-05 NOTE — ED PROVIDER NOTES
[facility] ED  eMERGENCY dEPARTMENT eNCOUnter   Independent Attestation     Pt Name: Thania Taylor  MRN: [id number]  Birthdate 1985  Date of evaluation: 1/5/24       Thania Taylor is a 38 y.o. female who presents with Chest Pain (Constant since last night. Pt reports she was laying down when it started. Pt also c/o nausea. Denies any other complaints at this time. PT sitting on cot. RR even and non labored. NAD noted at this time. Call light within reach. )    Twelve lead EKG interpreted by myself:  A 12 lead EKG done at 1316, interpreted by myself, showed a regular rhythm at a rate of 80bpm.  The UT interval was normal.  The QRS complex was normal.  There was no ST segment elevation or depression, T wave inversion not present.  QRS progression through precordial leads was grossly normal.  Interpretation: Normal sinus rhythm, no ST segment changes, no pattern consistent with acute ischemia or infarct    Based on the medical record, the care appears appropriate. I was personally available for consultation in the Emergency Department.    Cyrus Ye DO  Attending Emergency  Physician               Cyrus Ye DO  01/05/24 [id number]

## 2024-01-06 LAB
EKG ATRIAL RATE: 80 BPM
EKG P AXIS: 7 DEGREES
EKG P-R INTERVAL: 144 MS
EKG Q-T INTERVAL: 374 MS
EKG QRS DURATION: 78 MS
EKG QTC CALCULATION (BAZETT): 431 MS
EKG R AXIS: 64 DEGREES
EKG T AXIS: 39 DEGREES
EKG VENTRICULAR RATE: 80 BPM

## 2024-02-25 NOTE — TELEPHONE ENCOUNTER
From: Paitence Saavedra  To: Brenda Mosley  Sent: 3/15/2023 1:50 AM EDT  Subject: Thyroid    I was reviewing my labs from when I was in the hospital in November and my T3 free levels were high. Would it be possible to run more thyroid tests to see if I have hyperthyroidism? My antipsychotics can lead to it. Thank you. No

## 2024-03-16 DIAGNOSIS — G43.709 CHRONIC MIGRAINE WITHOUT AURA WITHOUT STATUS MIGRAINOSUS, NOT INTRACTABLE: ICD-10-CM

## 2024-03-18 RX ORDER — RIMEGEPANT SULFATE 75 MG/75MG
TABLET, ORALLY DISINTEGRATING ORAL
Qty: 8 TABLET | Refills: 0 | Status: SHIPPED | OUTPATIENT
Start: 2024-03-18

## 2024-03-20 ENCOUNTER — TELEPHONE (OUTPATIENT)
Dept: PRIMARY CARE CLINIC | Age: 39
End: 2024-03-20

## 2024-03-21 NOTE — TELEPHONE ENCOUNTER
PA for [facility] denied for the following reason(s):    \"The requested medication require the member to have a history of as least 14 days of therapy of two preferred (medication covered by the Plan) medications, which include but are not limited to: Naratriptan, Rizatriptan (oral disintegrating tablets or tablets), and sumatriptan (injection, nasal spray and tablet).\"    Please advise.

## 2024-03-22 NOTE — TELEPHONE ENCOUNTER
Call made to the patient to inform them of their PCP's instructions, writer unable to leave a voice message due to the patient's voice mailbox not allowing write to leave voice message.

## 2024-05-01 ENCOUNTER — PATIENT MESSAGE (OUTPATIENT)
Dept: PRIMARY CARE CLINIC | Age: 39
End: 2024-05-01

## 2024-05-01 DIAGNOSIS — E66.01 OBESITY, MORBID, BMI 40.0-49.9 (HCC): Primary | ICD-10-CM

## 2024-05-02 DIAGNOSIS — E66.01 OBESITY, MORBID, BMI 40.0-49.9 (HCC): Primary | ICD-10-CM

## 2024-05-02 NOTE — TELEPHONE ENCOUNTER
From: Thania Taylor  To: Juan Rizo  Sent: 5/1/2024 6:19 PM EDT  Subject: Referral    Can I have a referral sent to [facility] weight loss clinic to discuss my weight loss options. Thank you.

## 2024-06-03 ENCOUNTER — OFFICE VISIT (OUTPATIENT)
Dept: PRIMARY CARE CLINIC | Age: 39
End: 2024-06-03

## 2024-06-03 ENCOUNTER — HOSPITAL ENCOUNTER (OUTPATIENT)
Age: 39
Setting detail: SPECIMEN
Discharge: HOME | End: 2024-06-03

## 2024-06-03 VITALS
OXYGEN SATURATION: 97 % | DIASTOLIC BLOOD PRESSURE: 80 MMHG | HEIGHT: 63 IN | BODY MASS INDEX: 50.68 KG/M2 | SYSTOLIC BLOOD PRESSURE: 124 MMHG | HEART RATE: 79 BPM | WEIGHT: 286 LBS | RESPIRATION RATE: 16 BRPM

## 2024-06-03 DIAGNOSIS — Z12.4 SCREENING FOR CERVICAL CANCER: ICD-10-CM

## 2024-06-03 DIAGNOSIS — R73.03 PREDIABETES: ICD-10-CM

## 2024-06-03 DIAGNOSIS — F31.81 BIPOLAR 2 DISORDER (HCC): ICD-10-CM

## 2024-06-03 DIAGNOSIS — Z00.00 ANNUAL PHYSICAL EXAM: Primary | ICD-10-CM

## 2024-06-03 DIAGNOSIS — E66.01 MORBID OBESITY WITH BMI OF 50.0-59.9, ADULT (HCC): ICD-10-CM

## 2024-06-03 LAB
ALBUMIN SERPL-MCNC: 4.5 G/DL (ref 3.5–5.2)
ALBUMIN/GLOB SERPL: 1 {RATIO} (ref 1–2.5)
ALP SERPL-CCNC: 99 U/L (ref 35–104)
ALT SERPL-CCNC: 106 U/L (ref 10–35)
ANION GAP SERPL CALCULATED.3IONS-SCNC: 10 MMOL/L (ref 9–16)
AST SERPL-CCNC: 61 U/L (ref 10–35)
BILIRUB SERPL-MCNC: 0.7 MG/DL (ref 0–1.2)
BUN SERPL-MCNC: 11 MG/DL (ref 6–20)
CALCIUM SERPL-MCNC: 9.6 MG/DL (ref 8.6–10.4)
CHLORIDE SERPL-SCNC: 104 MMOL/L (ref 98–107)
CHOLEST SERPL-MCNC: 197 MG/DL (ref 0–199)
CHOLESTEROL/HDL RATIO: 4
CO2 SERPL-SCNC: 25 MMOL/L (ref 20–31)
CREAT SERPL-MCNC: 0.7 MG/DL (ref 0.5–0.9)
EST. AVERAGE GLUCOSE BLD GHB EST-MCNC: 120 MG/DL
GFR, ESTIMATED: >90 ML/MIN/1.73M2
GLUCOSE P FAST SERPL-MCNC: 93 MG/DL (ref 74–99)
HBA1C MFR BLD: 5.8 % (ref 4–6)
HDLC SERPL-MCNC: 50 MG/DL
LDLC SERPL CALC-MCNC: 120 MG/DL (ref 0–100)
POTASSIUM SERPL-SCNC: 4.4 MMOL/L (ref 3.7–5.3)
PROT SERPL-MCNC: 7.9 G/DL (ref 6.6–8.7)
SODIUM SERPL-SCNC: 139 MMOL/L (ref 136–145)
T4 FREE SERPL-MCNC: 1.1 NG/DL (ref 0.92–1.68)
TRIGL SERPL-MCNC: 138 MG/DL
TSH SERPL DL<=0.05 MIU/L-ACNC: 0.81 UIU/ML (ref 0.27–4.2)
VLDLC SERPL CALC-MCNC: 28 MG/DL

## 2024-06-03 RX ORDER — DESVENLAFAXINE 25 MG/1
25 TABLET, EXTENDED RELEASE ORAL EVERY MORNING
COMMUNITY
Start: 2024-05-12

## 2024-06-03 RX ORDER — QUETIAPINE FUMARATE 50 MG/1
50 TABLET, FILM COATED ORAL NIGHTLY
COMMUNITY
Start: 2024-05-21

## 2024-06-03 SDOH — ECONOMIC STABILITY: FOOD INSECURITY: WITHIN THE PAST 12 MONTHS, YOU WORRIED THAT YOUR FOOD WOULD RUN OUT BEFORE YOU GOT MONEY TO BUY MORE.: SOMETIMES TRUE

## 2024-06-03 SDOH — ECONOMIC STABILITY: INCOME INSECURITY: HOW HARD IS IT FOR YOU TO PAY FOR THE VERY BASICS LIKE FOOD, HOUSING, MEDICAL CARE, AND HEATING?: SOMEWHAT HARD

## 2024-06-03 SDOH — ECONOMIC STABILITY: FOOD INSECURITY: WITHIN THE PAST 12 MONTHS, THE FOOD YOU BOUGHT JUST DIDN'T LAST AND YOU DIDN'T HAVE MONEY TO GET MORE.: SOMETIMES TRUE

## 2024-06-03 ASSESSMENT — PATIENT HEALTH QUESTIONNAIRE - PHQ9
SUM OF ALL RESPONSES TO PHQ QUESTIONS 1-9: 11
6. FEELING BAD ABOUT YOURSELF - OR THAT YOU ARE A FAILURE OR HAVE LET YOURSELF OR YOUR FAMILY DOWN: SEVERAL DAYS
SUM OF ALL RESPONSES TO PHQ QUESTIONS 1-9: 11
SUM OF ALL RESPONSES TO PHQ QUESTIONS 1-9: 11
3. TROUBLE FALLING OR STAYING ASLEEP: NEARLY EVERY DAY
8. MOVING OR SPEAKING SO SLOWLY THAT OTHER PEOPLE COULD HAVE NOTICED. OR THE OPPOSITE, BEING SO FIGETY OR RESTLESS THAT YOU HAVE BEEN MOVING AROUND A LOT MORE THAN USUAL: SEVERAL DAYS
4. FEELING TIRED OR HAVING LITTLE ENERGY: NEARLY EVERY DAY
2. FEELING DOWN, DEPRESSED OR HOPELESS: NOT AT ALL
SUM OF ALL RESPONSES TO PHQ9 QUESTIONS 1 & 2: 0
1. LITTLE INTEREST OR PLEASURE IN DOING THINGS: NOT AT ALL
10. IF YOU CHECKED OFF ANY PROBLEMS, HOW DIFFICULT HAVE THESE PROBLEMS MADE IT FOR YOU TO DO YOUR WORK, TAKE CARE OF THINGS AT HOME, OR GET ALONG WITH OTHER PEOPLE: VERY DIFFICULT
7. TROUBLE CONCENTRATING ON THINGS, SUCH AS READING THE NEWSPAPER OR WATCHING TELEVISION: NOT AT ALL
5. POOR APPETITE OR OVEREATING: NEARLY EVERY DAY
SUM OF ALL RESPONSES TO PHQ QUESTIONS 1-9: 11
9. THOUGHTS THAT YOU WOULD BE BETTER OFF DEAD, OR OF HURTING YOURSELF: NOT AT ALL

## 2024-06-03 NOTE — PATIENT INSTRUCTIONS
[facility] Food Resources*  (Call [facility] for more resources)        [employer] [facility] [employer]  What they offer: Food pantry second and fourth Tuesday 4:30-6pm  Phone Number and Address: 422.339.6475 Toll Free: [employer], between [employer] and [employer]; [address] [facility] [address]  [facility] Office on Aging of [facility]  What they offer: “Meals & Nutrition” search option on website  Phone Number and Website: 784.873.9654; https://[employer]Rx.[employer]/  [facility] [employer]  What they offer: Dinner is open to all (must be registered and in good standing) and three hot meals a day for shelter residents. Breakfast 7-8am, Lunch 12-1pm, and Dinner 5-6pm daily.  Phone and Address: [phone]; [address] [facility] [address]  [employer] Last Mile Delivery program  What they offer: Food Box Delivery for those within [facility] who are homebound or without transportation. Applications done by phone. Qualifying individuals are eligible for 3 deliveries for the lifetime of the program.  Phone number: Call for eligibility check at 2-1-1 or 2-439-978[employer] (8235)  [facility]  What they offer: Food Pantry second and fourth Saturday 1-5pm  Phone and Address: [phone]; [address] [facility] [address]  [employer] Mobile Food Pantries   What they offer: Mobile pantries throughout the [facility]. Anyone in need may visit one pantry per month.  Phone Number and Website: For locations and schedule call 2-1-1 or 9-543-037-HELP (2536) or check the website www.feedtoledo.org  [employer]  What they offer: Food Pantry, call for schedule, open to All  Phone Number: 156.765.3077  [facility]  What they offer: Hot meals, Breakfast: Monday, Wednesday, Friday 8-10am, Lunch: Monday-Friday 10am-12:30pm. Food pantry (serves [address] or east of [facility])

## 2024-06-03 NOTE — PROGRESS NOTES
Neurological:  Negative for dizziness and headaches.   Psychiatric/Behavioral:  Negative for confusion, dysphoric mood and sleep disturbance. The patient is not nervous/anxious.    All other systems reviewed and are negative.      Objective:     Physical Exam  Vitals and nursing note reviewed.   Constitutional:       General: She is not in acute distress.     Appearance: Normal appearance. She is obese.   HENT:      Head: Normocephalic.      Right Ear: External ear normal.      Left Ear: External ear normal.      Mouth/Throat:      Mouth: Mucous membranes are moist.   Eyes:      Extraocular Movements: Extraocular movements intact.      Conjunctiva/sclera: Conjunctivae normal.      Pupils: Pupils are equal, round, and reactive to light.   Cardiovascular:      Rate and Rhythm: Normal rate and regular rhythm.      Pulses: Normal pulses.      Heart sounds: Normal heart sounds. No murmur heard.  Pulmonary:      Effort: Pulmonary effort is normal. No respiratory distress.      Breath sounds: Normal breath sounds.   Abdominal:      General: Abdomen is flat. Bowel sounds are normal.      Palpations: Abdomen is soft.      Tenderness: There is no abdominal tenderness.   Musculoskeletal:      Cervical back: Normal range of motion.      Right lower leg: No edema.      Left lower leg: No edema.   Lymphadenopathy:      Cervical: No cervical adenopathy.   Skin:     General: Skin is warm.      Capillary Refill: Capillary refill takes less than 2 seconds.   Neurological:      General: No focal deficit present.      Mental Status: She is alert and oriented to person, place, and time.   Psychiatric:         Mood and Affect: Mood normal.         Behavior: Behavior normal.       /80 (Site: Left Upper Arm, Position: Sitting, Cuff Size: Large Adult)   Pulse 79   Resp 16   Ht 1.6 m (5' 3\")   Wt 129.7 kg (286 lb)   LMP 09/16/2017   SpO2 97%   BMI 50.66 kg/m²     Assessment:       ICD-10-CM    1. Annual physical exam  Z00.00

## 2024-06-04 DIAGNOSIS — R73.03 PREDIABETES: Primary | ICD-10-CM

## 2024-06-04 DIAGNOSIS — E88.819 INSULIN RESISTANCE: ICD-10-CM

## 2024-06-04 DIAGNOSIS — E28.2 PCOS (POLYCYSTIC OVARIAN SYNDROME): ICD-10-CM

## 2024-06-04 DIAGNOSIS — E66.01 MORBID OBESITY WITH BMI OF 50.0-59.9, ADULT (HCC): ICD-10-CM

## 2024-06-04 ASSESSMENT — ENCOUNTER SYMPTOMS
CONSTIPATION: 0
DIARRHEA: 0
SHORTNESS OF BREATH: 0
RHINORRHEA: 0
NAUSEA: 0
SINUS PAIN: 0
COUGH: 0
BACK PAIN: 0
ABDOMINAL PAIN: 0
VOMITING: 0

## 2024-06-05 ENCOUNTER — TELEPHONE (OUTPATIENT)
Dept: PRIMARY CARE CLINIC | Age: 39
End: 2024-06-05

## 2024-06-10 DIAGNOSIS — Z80.3 FAMILY HISTORY OF BREAST CANCER: Primary | ICD-10-CM

## 2024-06-10 RX ORDER — SEMAGLUTIDE 0.25 MG/.5ML
0.25 INJECTION, SOLUTION SUBCUTANEOUS
Qty: 3 ML | Refills: 3 | Status: SHIPPED | OUTPATIENT
Start: 2024-06-10

## 2024-06-12 NOTE — TELEPHONE ENCOUNTER
Case ID: [id number] Appeal supported: No  Note from payer: Denial Reason was not specified   Payer: [employer] Patient's Payer    [phone]    PA for Ozempic Denied.

## 2024-06-14 ENCOUNTER — TELEPHONE (OUTPATIENT)
Dept: PRIMARY CARE CLINIC | Age: 39
End: 2024-06-14

## 2024-06-14 NOTE — TELEPHONE ENCOUNTER
Please update patient  Follow up with PCP for any alternatives- suggest they review with insurance prior

## 2024-06-14 NOTE — TELEPHONE ENCOUNTER
PA for Wegovy denied for the following reason(s):     \"Electronic prior authorization not supported as NDC is not payable.\"

## 2024-06-17 ENCOUNTER — OFFICE VISIT (OUTPATIENT)
Dept: BARIATRICS/WEIGHT MGMT | Age: 39
End: 2024-06-17
Payer: COMMERCIAL

## 2024-06-17 VITALS
SYSTOLIC BLOOD PRESSURE: 118 MMHG | OXYGEN SATURATION: 96 % | HEIGHT: 63 IN | HEART RATE: 78 BPM | DIASTOLIC BLOOD PRESSURE: 71 MMHG | WEIGHT: 283 LBS | BODY MASS INDEX: 50.14 KG/M2

## 2024-06-17 DIAGNOSIS — R73.03 PREDIABETES: ICD-10-CM

## 2024-06-17 DIAGNOSIS — F31.81 BIPOLAR 2 DISORDER (HCC): ICD-10-CM

## 2024-06-17 DIAGNOSIS — E66.01 MORBID OBESITY WITH BMI OF 50.0-59.9, ADULT (HCC): Primary | ICD-10-CM

## 2024-06-17 PROCEDURE — G8417 CALC BMI ABV UP PARAM F/U: HCPCS | Performed by: NURSE PRACTITIONER

## 2024-06-17 PROCEDURE — G8427 DOCREV CUR MEDS BY ELIG CLIN: HCPCS | Performed by: NURSE PRACTITIONER

## 2024-06-17 PROCEDURE — 1036F TOBACCO NON-USER: CPT | Performed by: NURSE PRACTITIONER

## 2024-06-17 PROCEDURE — 99215 OFFICE O/P EST HI 40 MIN: CPT | Performed by: NURSE PRACTITIONER

## 2024-06-17 RX ORDER — PHENTERMINE HYDROCHLORIDE 37.5 MG/1
37.5 TABLET ORAL
Qty: 30 TABLET | Refills: 0 | Status: SHIPPED | OUTPATIENT
Start: 2024-06-17 | End: 2024-07-17

## 2024-06-17 NOTE — PROGRESS NOTES
[facility] INVASIVE BARIATRIC SURG  [address]  [address]  [place] [id number]  Dept: [phone]  Fax: [phone]    MEDICATION WEIGHT LOSS MANAGEMENT PROGRAM  PROGRESS NOTE      CC: Weight Loss      Subjective:      Chief Complaint   Patient presents with    Weight Loss     New patient         Thania Taylor is a 38 y.o. female who is here today for evaluation and treatment of obesity. Patient cites health, increased physical ability, self-image as reasons for wanting to lose weight. She is here with her  today who is very supportive.     Obesity History    Period of greatest weight gain: 100 lbs during mid adult years d/t zyperxa  Lowest adult weight: 125 lbs at age 17  Highest adult weight: 336 lbs at age 37 d/t being on zyprexa  Amount of time at present weight: several months      History of Weight Loss Efforts  Greatest amount of weight lost: 15 lbs over 6 months  Amount of time that loss was maintained: still maintaining that weight loss    Successful weight loss techniques attempted:  low carb/calorie deficit/high protein diet  Unsuccessful weight loss techniques attempted:  adipex, low fat, intermittent fasting, keto    She has been working a low carb diet/calorie deficit and increasing her protein since the end of last year. She has also been walking more.   She has lost 15 pounds since Jan 2024 on her own with lifestyle changes.     I reviewed notes from PCP and she hasn't having a lot of weight loss on the adipex when she was on it previously. She also got diagnosed with bipolar disorder at that time. Because she wasn't having much weight loss and the recent bipolar diagnosis, it was stopped. She states while she was on it, she felt better actually. She didn't have any worsening anxiety or insomnia issues. She states that her brain actually felt calmer and tolerated it well.     Current Exercise Habits walking - she has been

## 2024-06-17 NOTE — TELEPHONE ENCOUNTER
[employer] Message sent to patient informing them of the denial and the provider's instructions.

## 2024-07-08 ENCOUNTER — OFFICE VISIT (OUTPATIENT)
Dept: PRIMARY CARE CLINIC | Age: 39
End: 2024-07-08
Payer: COMMERCIAL

## 2024-07-08 VITALS
RESPIRATION RATE: 16 BRPM | HEART RATE: 93 BPM | OXYGEN SATURATION: 98 % | BODY MASS INDEX: 49.01 KG/M2 | DIASTOLIC BLOOD PRESSURE: 86 MMHG | SYSTOLIC BLOOD PRESSURE: 134 MMHG | WEIGHT: 276.6 LBS | HEIGHT: 63 IN

## 2024-07-08 DIAGNOSIS — E66.01 MORBID OBESITY WITH BMI OF 50.0-59.9, ADULT (HCC): ICD-10-CM

## 2024-07-08 DIAGNOSIS — R79.89 ELEVATED LFTS: Primary | ICD-10-CM

## 2024-07-08 DIAGNOSIS — R73.03 PREDIABETES: ICD-10-CM

## 2024-07-08 PROCEDURE — G8427 DOCREV CUR MEDS BY ELIG CLIN: HCPCS | Performed by: PHYSICIAN ASSISTANT

## 2024-07-08 PROCEDURE — G8417 CALC BMI ABV UP PARAM F/U: HCPCS | Performed by: PHYSICIAN ASSISTANT

## 2024-07-08 PROCEDURE — 99214 OFFICE O/P EST MOD 30 MIN: CPT | Performed by: PHYSICIAN ASSISTANT

## 2024-07-08 PROCEDURE — 1036F TOBACCO NON-USER: CPT | Performed by: PHYSICIAN ASSISTANT

## 2024-07-08 SDOH — ECONOMIC STABILITY: FOOD INSECURITY: WITHIN THE PAST 12 MONTHS, YOU WORRIED THAT YOUR FOOD WOULD RUN OUT BEFORE YOU GOT MONEY TO BUY MORE.: NEVER TRUE

## 2024-07-08 SDOH — ECONOMIC STABILITY: FOOD INSECURITY: WITHIN THE PAST 12 MONTHS, THE FOOD YOU BOUGHT JUST DIDN'T LAST AND YOU DIDN'T HAVE MONEY TO GET MORE.: NEVER TRUE

## 2024-07-08 SDOH — ECONOMIC STABILITY: INCOME INSECURITY: HOW HARD IS IT FOR YOU TO PAY FOR THE VERY BASICS LIKE FOOD, HOUSING, MEDICAL CARE, AND HEATING?: NOT HARD AT ALL

## 2024-07-08 ASSESSMENT — PATIENT HEALTH QUESTIONNAIRE - PHQ9
10. IF YOU CHECKED OFF ANY PROBLEMS, HOW DIFFICULT HAVE THESE PROBLEMS MADE IT FOR YOU TO DO YOUR WORK, TAKE CARE OF THINGS AT HOME, OR GET ALONG WITH OTHER PEOPLE: NOT DIFFICULT AT ALL
1. LITTLE INTEREST OR PLEASURE IN DOING THINGS: NOT AT ALL
2. FEELING DOWN, DEPRESSED OR HOPELESS: NOT AT ALL
SUM OF ALL RESPONSES TO PHQ QUESTIONS 1-9: 0
4. FEELING TIRED OR HAVING LITTLE ENERGY: NOT AT ALL
SUM OF ALL RESPONSES TO PHQ QUESTIONS 1-9: 0
6. FEELING BAD ABOUT YOURSELF - OR THAT YOU ARE A FAILURE OR HAVE LET YOURSELF OR YOUR FAMILY DOWN: NOT AT ALL
SUM OF ALL RESPONSES TO PHQ9 QUESTIONS 1 & 2: 0
5. POOR APPETITE OR OVEREATING: NOT AT ALL
9. THOUGHTS THAT YOU WOULD BE BETTER OFF DEAD, OR OF HURTING YOURSELF: NOT AT ALL
8. MOVING OR SPEAKING SO SLOWLY THAT OTHER PEOPLE COULD HAVE NOTICED. OR THE OPPOSITE, BEING SO FIGETY OR RESTLESS THAT YOU HAVE BEEN MOVING AROUND A LOT MORE THAN USUAL: NOT AT ALL
SUM OF ALL RESPONSES TO PHQ QUESTIONS 1-9: 0
7. TROUBLE CONCENTRATING ON THINGS, SUCH AS READING THE NEWSPAPER OR WATCHING TELEVISION: NOT AT ALL
3. TROUBLE FALLING OR STAYING ASLEEP: NOT AT ALL
SUM OF ALL RESPONSES TO PHQ QUESTIONS 1-9: 0

## 2024-07-08 ASSESSMENT — ENCOUNTER SYMPTOMS
SINUS PAIN: 0
VOMITING: 0
SHORTNESS OF BREATH: 0
COUGH: 0
DIARRHEA: 0
ABDOMINAL PAIN: 0
NAUSEA: 0
RHINORRHEA: 0
BACK PAIN: 0
CONSTIPATION: 0

## 2024-07-08 NOTE — PROGRESS NOTES
[facility]  [facility]  [address] [place] [address] [address]  [facility] [address]  Dept: [phone]  Dept Fax: [phone]    Thania Taylor is a 38 y.o. female who presents today for her medical conditions/complaints as noted below.    Chief Complaint   Patient presents with    Hypertension       HPI:     Patient presents to the office for a lab follow-up. She has past medical history including migraine, bipolar, PTSD, depression, obesity, & prediabetes. She is compliant with all of her medications.   She has recently followed with bariatric team.  She was prescribed Adipex for weight loss.  Adipex is working well and is compliant, reports that it helps her focus more. Has lost some weight since last visit. Recently saw bariatric surgery in June.     Pt would also like to discuss her elevated liver enzymes.  Denies any abdominal pain.  No history of alcohol use.  No history of hepatitis.    Hypertension  This is a chronic problem. The current episode started more than 1 year ago. The problem is unchanged. Pertinent negatives include no chest pain, headaches or shortness of breath. Risk factors for coronary artery disease include stress and obesity.     Hemoglobin A1C (%)   Date Value   06/03/2024 5.8   03/20/2023 5.8   04/19/2022 5.2             ( goal A1C is < 7)   No components found for: \"LABMICR\"  No components found for: \"LDLCHOLESTEROL\", \"LDLCALC\"    (goal LDL is <100)   AST (U/L)   Date Value   06/03/2024 61 (H)     ALT (U/L)   Date Value   06/03/2024 106 (H)     BUN (mg/dL)   Date Value   06/03/2024 11     BP Readings from Last 3 Encounters:   07/08/24 134/86   06/17/24 118/71   06/03/24 124/80          (goal 120/80)    Past Medical History:   Diagnosis Date    Anxiety     bipolar    Back pain     herniated disc    Hyperlipidemia     Hypertension     no medications, states it is only high at dr chakraborty    Kidney infection     Kidney stone     Migraines     Obesity

## 2024-07-18 ENCOUNTER — OFFICE VISIT (OUTPATIENT)
Dept: BARIATRICS/WEIGHT MGMT | Age: 39
End: 2024-07-18
Payer: COMMERCIAL

## 2024-07-18 VITALS
WEIGHT: 276 LBS | SYSTOLIC BLOOD PRESSURE: 130 MMHG | BODY MASS INDEX: 48.9 KG/M2 | DIASTOLIC BLOOD PRESSURE: 88 MMHG | HEIGHT: 63 IN | HEART RATE: 90 BPM

## 2024-07-18 DIAGNOSIS — R73.03 PREDIABETES: ICD-10-CM

## 2024-07-18 DIAGNOSIS — E66.01 MORBID OBESITY WITH BMI OF 45.0-49.9, ADULT (HCC): Primary | ICD-10-CM

## 2024-07-18 PROCEDURE — G8417 CALC BMI ABV UP PARAM F/U: HCPCS | Performed by: NURSE PRACTITIONER

## 2024-07-18 PROCEDURE — 1036F TOBACCO NON-USER: CPT | Performed by: NURSE PRACTITIONER

## 2024-07-18 PROCEDURE — 99213 OFFICE O/P EST LOW 20 MIN: CPT | Performed by: NURSE PRACTITIONER

## 2024-07-18 PROCEDURE — G8427 DOCREV CUR MEDS BY ELIG CLIN: HCPCS | Performed by: NURSE PRACTITIONER

## 2024-07-18 RX ORDER — PHENTERMINE HYDROCHLORIDE 37.5 MG/1
37.5 TABLET ORAL
Qty: 30 TABLET | Refills: 0 | Status: SHIPPED | OUTPATIENT
Start: 2024-07-18 | End: 2024-08-17

## 2024-07-18 ASSESSMENT — ENCOUNTER SYMPTOMS
ABDOMINAL PAIN: 0
CONSTIPATION: 0
NAUSEA: 0
VOMITING: 0
WHEEZING: 0
CHEST TIGHTNESS: 0
SHORTNESS OF BREATH: 0
COUGH: 0

## 2024-07-18 NOTE — PROGRESS NOTES
Medical Nutrition Therapy for Non-Surgical Weight Loss  Nutrition Follow-Up: Weight Loss Medication    Nutrition Assessment:  Patient is taking adipex per KASSI Cruz for weight loss. Patient has lost 7 lbs since last visit.   Patient's nutrition concerns include none at this time.  Patient reports having a chicken breast for lunch and protein shake daily, switching to low fat/non fat items,   Patient is eating 4 times per day. Typically eating 3 meals and 1 snack daily. Patient reports being consistent with meal times daily.   Patient is drinking at least 64 oz of fluid and sugar free beverages. Patient is typically drinking water and occasionally regular soda, 2% milk.   Patient reports not adding physical activity to daily life to remain active. Patient reports she is on her feet a lot during work.   Discussed goal of fruit or vegetable with every meal and snack, fiber.    24-hr diet recall scanned into chart.    Vitals:   Vitals:    07/18/24 1435   BP: 130/88   Site: Right Upper Arm   Position: Sitting   Cuff Size: Large Adult   Pulse: 90   Weight: 125.2 kg (276 lb)   Height: 1.6 m (5' 3\")      Body mass index is 48.89 kg/m².    Estimated Energy Needs:  Calorie (MSJ x AF - 500): 1800 kcals  Protein: 60-80 grams protein  Fluids: minimum 64 oz fluid    Nutrition Diagnosis:  Overweight/Obesity related to complex combination of decreased energy needs, disordered eating patterns, physical inactivity, and increased psychological/life stress as evidenced by Body mass index is 48.89 kg/m².    Nutrition Intervention:  Diet: Low-Fat Diet, 60-80gm of protein, and 64oz of fluid    Nutrition Education: Nutrition Care Manual    Goal:   Eat a variety of fruits and vegetables, lean protein, whole grains and low-fat dairy.   Sip on water or sugar-free fluid throughout the day.   Aim for 3 meals and 1-2 snacks daily.    Discuss activity with physician and determine a plan for remaining

## 2024-07-18 NOTE — PROGRESS NOTES
[facility] INVASIVE BARIATRIC SURG  [address]  [address]  [place] [id number]  Dept: [phone]    MEDICATION WEIGHT LOSS MANAGEMENT PROGRAM  PROGRESS NOTE     CC: Weight Loss    Patient: Thania Taylor      Service Date: 7/18/2024  Visit:   1 month follow up on medications    Medical Record #: [id number]  Current Visit Weight Information  Weight: 125.2 kg (276 lb)   BMI: Body mass index is 48.89 kg/m².        History: 38 y.o. female who presents today for a 1 month follow up on weight loss medication. Patient has been following monthly with our office for the management of obesity since June 2024.    Medication: adipex  Amount of weight loss in the last month: 7 lbs  Amount of weight loss total: 7 lbs  Current dose: 37.5 mg  Side Effects: dry mouth but tolerable.      She feels more energy during the day, appetite is decreased. She is working on increasing protein during the day.     She denies mental health changes, tachycardia, palpitations, insomnia, anxiety, hypertension.     She is getting Liver US completed on Monday for elevated LFT on her labs from 6/3/2024.    Height: 1.6 m (5' 3\")  Highest Weight:   283 lbs      Exercising?   [x] Yes    [] No   Walking - 2-3 miles at least 5 days a week  Review of Systems:     Review of Systems   Constitutional:  Negative for appetite change, chills, diaphoresis, fatigue and fever.        + obesity   Respiratory:  Negative for cough, chest tightness, shortness of breath and wheezing.    Cardiovascular:  Negative for chest pain and palpitations.   Gastrointestinal:  Negative for abdominal pain, constipation, nausea and vomiting.   Neurological:  Negative for dizziness, weakness, light-headedness and headaches.   Psychiatric/Behavioral:  Negative for agitation, dysphoric mood and sleep disturbance. The patient is not nervous/anxious.    All other systems reviewed and are negative.      Physical Assessment:

## 2024-07-22 ENCOUNTER — HOSPITAL ENCOUNTER (OUTPATIENT)
Dept: ULTRASOUND IMAGING | Age: 39
Discharge: HOME | End: 2024-07-24
Payer: COMMERCIAL

## 2024-07-22 ENCOUNTER — HOSPITAL ENCOUNTER (OUTPATIENT)
Age: 39
Setting detail: SPECIMEN
Discharge: HOME | End: 2024-07-22

## 2024-07-22 DIAGNOSIS — R79.89 ELEVATED LFTS: ICD-10-CM

## 2024-07-22 LAB
ALBUMIN SERPL-MCNC: 4.4 G/DL (ref 3.5–5.2)
ALBUMIN/GLOB SERPL: 1 {RATIO} (ref 1–2.5)
ALP SERPL-CCNC: 86 U/L (ref 35–104)
ALT SERPL-CCNC: 86 U/L (ref 10–35)
AST SERPL-CCNC: 43 U/L (ref 10–35)
BILIRUB DIRECT SERPL-MCNC: 0.3 MG/DL (ref 0–0.2)
BILIRUB INDIRECT SERPL-MCNC: 0.4 MG/DL (ref 0–1)
BILIRUB SERPL-MCNC: 0.7 MG/DL (ref 0–1.2)
GLOBULIN SER CALC-MCNC: 3.3 G/DL
HAV IGM SERPL QL IA: NONREACTIVE
HBV CORE IGM SERPL QL IA: NONREACTIVE
HBV SURFACE AG SERPL QL IA: NONREACTIVE
HCV AB SERPL QL IA: NONREACTIVE
IRON SATN MFR SERPL: 20 % (ref 20–55)
IRON SERPL-MCNC: 60 UG/DL (ref 37–145)
PROT SERPL-MCNC: 7.7 G/DL (ref 6.6–8.7)
TIBC SERPL-MCNC: 304 UG/DL (ref 250–450)
UNSATURATED IRON BINDING CAPACITY: 244 UG/DL (ref 112–347)

## 2024-07-22 PROCEDURE — 76705 ECHO EXAM OF ABDOMEN: CPT

## 2024-08-02 NOTE — TELEPHONE ENCOUNTER
ANNE Munson sent to plan via Epic.
Closed  PA Detail   Other Case ID: [id number]      Payer:  [employer] - Commercial   This PA was unable to be processed at this time. Please contact the plan directly.
153

## 2024-08-15 ENCOUNTER — OFFICE VISIT (OUTPATIENT)
Dept: PRIMARY CARE CLINIC | Age: 39
End: 2024-08-15
Payer: COMMERCIAL

## 2024-08-15 ENCOUNTER — OFFICE VISIT (OUTPATIENT)
Dept: BARIATRICS/WEIGHT MGMT | Age: 39
End: 2024-08-15
Payer: COMMERCIAL

## 2024-08-15 VITALS
DIASTOLIC BLOOD PRESSURE: 70 MMHG | HEIGHT: 63 IN | SYSTOLIC BLOOD PRESSURE: 100 MMHG | HEART RATE: 90 BPM | WEIGHT: 272 LBS | BODY MASS INDEX: 48.2 KG/M2

## 2024-08-15 VITALS
RESPIRATION RATE: 16 BRPM | OXYGEN SATURATION: 98 % | WEIGHT: 272.4 LBS | HEART RATE: 96 BPM | DIASTOLIC BLOOD PRESSURE: 86 MMHG | HEIGHT: 63 IN | SYSTOLIC BLOOD PRESSURE: 124 MMHG | BODY MASS INDEX: 48.27 KG/M2

## 2024-08-15 DIAGNOSIS — K76.0 FATTY LIVER: Primary | ICD-10-CM

## 2024-08-15 DIAGNOSIS — F31.81 BIPOLAR 2 DISORDER (HCC): ICD-10-CM

## 2024-08-15 DIAGNOSIS — R73.03 PREDIABETES: ICD-10-CM

## 2024-08-15 DIAGNOSIS — E66.01 MORBID OBESITY WITH BMI OF 45.0-49.9, ADULT (HCC): ICD-10-CM

## 2024-08-15 DIAGNOSIS — K76.0 FATTY LIVER DISEASE, NONALCOHOLIC: ICD-10-CM

## 2024-08-15 DIAGNOSIS — R79.89 ELEVATED LFTS: Primary | ICD-10-CM

## 2024-08-15 PROCEDURE — G8427 DOCREV CUR MEDS BY ELIG CLIN: HCPCS | Performed by: PHYSICIAN ASSISTANT

## 2024-08-15 PROCEDURE — G8427 DOCREV CUR MEDS BY ELIG CLIN: HCPCS | Performed by: NURSE PRACTITIONER

## 2024-08-15 PROCEDURE — G8417 CALC BMI ABV UP PARAM F/U: HCPCS | Performed by: NURSE PRACTITIONER

## 2024-08-15 PROCEDURE — G8417 CALC BMI ABV UP PARAM F/U: HCPCS | Performed by: PHYSICIAN ASSISTANT

## 2024-08-15 PROCEDURE — 99213 OFFICE O/P EST LOW 20 MIN: CPT | Performed by: NURSE PRACTITIONER

## 2024-08-15 PROCEDURE — 1036F TOBACCO NON-USER: CPT | Performed by: PHYSICIAN ASSISTANT

## 2024-08-15 PROCEDURE — 99213 OFFICE O/P EST LOW 20 MIN: CPT | Performed by: PHYSICIAN ASSISTANT

## 2024-08-15 PROCEDURE — 1036F TOBACCO NON-USER: CPT | Performed by: NURSE PRACTITIONER

## 2024-08-15 RX ORDER — PHENTERMINE HYDROCHLORIDE 37.5 MG/1
37.5 TABLET ORAL
Qty: 30 TABLET | Refills: 0 | Status: SHIPPED | OUTPATIENT
Start: 2024-08-15 | End: 2024-09-14

## 2024-08-15 SDOH — ECONOMIC STABILITY: FOOD INSECURITY: WITHIN THE PAST 12 MONTHS, YOU WORRIED THAT YOUR FOOD WOULD RUN OUT BEFORE YOU GOT MONEY TO BUY MORE.: NEVER TRUE

## 2024-08-15 SDOH — ECONOMIC STABILITY: FOOD INSECURITY: WITHIN THE PAST 12 MONTHS, THE FOOD YOU BOUGHT JUST DIDN'T LAST AND YOU DIDN'T HAVE MONEY TO GET MORE.: NEVER TRUE

## 2024-08-15 SDOH — ECONOMIC STABILITY: INCOME INSECURITY: HOW HARD IS IT FOR YOU TO PAY FOR THE VERY BASICS LIKE FOOD, HOUSING, MEDICAL CARE, AND HEATING?: NOT HARD AT ALL

## 2024-08-15 ASSESSMENT — PATIENT HEALTH QUESTIONNAIRE - PHQ9
SUM OF ALL RESPONSES TO PHQ QUESTIONS 1-9: 9
4. FEELING TIRED OR HAVING LITTLE ENERGY: NEARLY EVERY DAY
10. IF YOU CHECKED OFF ANY PROBLEMS, HOW DIFFICULT HAVE THESE PROBLEMS MADE IT FOR YOU TO DO YOUR WORK, TAKE CARE OF THINGS AT HOME, OR GET ALONG WITH OTHER PEOPLE: SOMEWHAT DIFFICULT
6. FEELING BAD ABOUT YOURSELF - OR THAT YOU ARE A FAILURE OR HAVE LET YOURSELF OR YOUR FAMILY DOWN: NOT AT ALL
3. TROUBLE FALLING OR STAYING ASLEEP: NEARLY EVERY DAY
1. LITTLE INTEREST OR PLEASURE IN DOING THINGS: SEVERAL DAYS
SUM OF ALL RESPONSES TO PHQ9 QUESTIONS 1 & 2: 2
7. TROUBLE CONCENTRATING ON THINGS, SUCH AS READING THE NEWSPAPER OR WATCHING TELEVISION: NOT AT ALL
SUM OF ALL RESPONSES TO PHQ QUESTIONS 1-9: 9
SUM OF ALL RESPONSES TO PHQ QUESTIONS 1-9: 9
8. MOVING OR SPEAKING SO SLOWLY THAT OTHER PEOPLE COULD HAVE NOTICED. OR THE OPPOSITE, BEING SO FIGETY OR RESTLESS THAT YOU HAVE BEEN MOVING AROUND A LOT MORE THAN USUAL: NOT AT ALL
5. POOR APPETITE OR OVEREATING: SEVERAL DAYS
SUM OF ALL RESPONSES TO PHQ QUESTIONS 1-9: 9
9. THOUGHTS THAT YOU WOULD BE BETTER OFF DEAD, OR OF HURTING YOURSELF: NOT AT ALL

## 2024-08-15 ASSESSMENT — ENCOUNTER SYMPTOMS
CHEST TIGHTNESS: 0
VOMITING: 0
ABDOMINAL PAIN: 0
COUGH: 0
NAUSEA: 0
CONSTIPATION: 0
WHEEZING: 0
SHORTNESS OF BREATH: 0

## 2024-08-15 NOTE — PROGRESS NOTES
Priority:   Routine     Referral Type:   Eval and Treat     Referral Reason:   Specialty Services Required     Referred to Provider:   Jason Cote MD     Requested Specialty:   Gastroenterology     Number of Visits Requested:   1         Patient given educational materials - see patient instructions.  Discussed use, benefit, and side effects of prescribedmedications.  All patient questions answered. Pt voiced understanding. Reviewed health maintenance.  Instructed to continue current medications, diet and exercise.  Patient agreed with treatment plan. Follow up as directed.        Electronically signed by Juan Rizo PA-C on 8/15/2024 at 2:21 PM

## 2024-08-15 NOTE — PROGRESS NOTES
Medical Nutrition Therapy for Non-Surgical Weight Loss  Nutrition Follow-Up: Weight Loss Medication    Nutrition Assessment:  Patient is taking adipex per KASSI Cruz for weight loss. Patient has lost 4 lbs since last visit.   Patient's nutrition questions include none.   Feels does well with protein, watching carbs. Eating fruits and vegetables daily. Reported having some emotional eating days, recently started seeing a therapist.  Patient is eating 4 times per day. Typically eating 3 meals and 1 snack daily. Patient reports being consistent with meal times daily.   Patient is drinking at least 112 oz of fluid and sugar free beverages. Patient is typically drinking water and occasionally a sip of regular soda or milk.  Patient reports walking 5 times per week to remain active.   Discussed adequate intakes, Myplate guidelines. Encouraged to incorporate at least 3 food groups at meals.  Provided MyPlate Handout(s).    24-hr diet recall scanned into chart.    Vitals:   Vitals:    08/15/24 1431   BP: 100/70   Site: Right Upper Arm   Position: Sitting   Cuff Size: Large Adult   Pulse: 90   Weight: 123.4 kg (272 lb)   Height: 1.6 m (5' 3\")      Body mass index is 48.18 kg/m².    Estimated Energy Needs:  Calorie (MSJ x AF - 500): 6150-7341 kcals  Protein: 60-80 grams protein  Fluids: minimum 64 oz fluid    Nutrition Diagnosis:  Overweight/Obesity related to complex combination of decreased energy needs, disordered eating patterns, physical inactivity, and increased psychological/life stress as evidenced by Body mass index is 48.18 kg/m².    Nutrition Intervention:  Diet: Low-Fat Diet, 60-80 gm of protein, and 64 oz of fluid/day.    Nutrition Education: Nutrition Care Manual    Goal:   Eat a variety of fruits and vegetables, lean protein, whole grains and low-fat dairy.   Sip on water or sugar-free fluid throughout the day.   Aim for 3 meals and 1-2 snacks daily.    Discuss activity with physician and

## 2024-08-15 NOTE — PROGRESS NOTES
[facility] INVASIVE BARIATRIC SURG  [address]  [address]  [place] [id number]  Dept: [phone]    MEDICATION WEIGHT LOSS MANAGEMENT PROGRAM  PROGRESS NOTE     CC: Weight Loss    Patient: Thania Taylor      Service Date: 8/15/2024  Visit:   1 month follow up on medications    Medical Record #: [id number]  Current Visit Weight Information  Weight: 123.4 kg (272 lb)   BMI: Body mass index is 48.18 kg/m².        History: 38 y.o. female who presents today for a 1 month follow up on weight loss medication. Patient has been following monthly with our office for the management of obesity since June 2024.    Medication: adipex  Amount of weight loss in the last month: 4 lbs  Amount of weight loss total: 11 lbs  Current dose: 37.5 mg  Side Effects: dry mouth but tolerable.      She feels more energy during the day, appetite is decreased. She is working on increasing protein during the day.     She denies mental health changes, tachycardia, palpitations, insomnia, anxiety, hypertension.     Liver US completed through her PCP which confirms fatty liver disease.     Diet recall reviewed - very little caloric intake the day before.  She states she has been going to counseling and that has been difficultly for her mentally/emotionally.     Height: 1.6 m (5' 3\")  Highest Weight:   283 lbs      Exercising?   [x] Yes    [] No   Walking - 2-3 miles at least 5 days a week  Review of Systems:     Review of Systems   Constitutional:  Negative for appetite change, chills, diaphoresis, fatigue and fever.        + obesity   Respiratory:  Negative for cough, chest tightness, shortness of breath and wheezing.    Cardiovascular:  Negative for chest pain and palpitations.   Gastrointestinal:  Negative for abdominal pain, constipation, nausea and vomiting.   Neurological:  Negative for dizziness, weakness, light-headedness and headaches.   Psychiatric/Behavioral:  Negative for

## 2024-08-16 ASSESSMENT — ENCOUNTER SYMPTOMS
VOMITING: 0
SINUS PAIN: 0
RHINORRHEA: 0
NAUSEA: 0
COUGH: 0
ABDOMINAL PAIN: 0
SHORTNESS OF BREATH: 0
CONSTIPATION: 0
BACK PAIN: 0
DIARRHEA: 0

## 2024-08-21 ENCOUNTER — TELEPHONE (OUTPATIENT)
Dept: GASTROENTEROLOGY | Age: 39
End: 2024-08-21

## 2024-08-26 ENCOUNTER — PATIENT MESSAGE (OUTPATIENT)
Dept: GASTROENTEROLOGY | Age: 39
End: 2024-08-26

## 2024-09-26 ENCOUNTER — TELEPHONE (OUTPATIENT)
Dept: PRIMARY CARE CLINIC | Age: 39
End: 2024-09-26

## 2024-11-18 ENCOUNTER — HOSPITAL ENCOUNTER (OUTPATIENT)
Age: 39
Discharge: HOME | End: 2024-11-20
Payer: COMMERCIAL

## 2024-11-18 ENCOUNTER — HOSPITAL ENCOUNTER (OUTPATIENT)
Age: 39
Setting detail: SPECIMEN
Discharge: HOME | End: 2024-11-18

## 2024-11-18 ENCOUNTER — OFFICE VISIT (OUTPATIENT)
Dept: OBGYN CLINIC | Age: 39
End: 2024-11-18
Payer: COMMERCIAL

## 2024-11-18 ENCOUNTER — HOSPITAL ENCOUNTER (OUTPATIENT)
Dept: GENERAL RADIOLOGY | Age: 39
Discharge: HOME | End: 2024-11-20
Payer: COMMERCIAL

## 2024-11-18 ENCOUNTER — OFFICE VISIT (OUTPATIENT)
Dept: FAMILY MEDICINE CLINIC | Age: 39
End: 2024-11-18

## 2024-11-18 VITALS
OXYGEN SATURATION: 98 % | HEART RATE: 79 BPM | RESPIRATION RATE: 16 BRPM | SYSTOLIC BLOOD PRESSURE: 124 MMHG | TEMPERATURE: 98 F | DIASTOLIC BLOOD PRESSURE: 82 MMHG

## 2024-11-18 VITALS — BODY MASS INDEX: 47.47 KG/M2 | SYSTOLIC BLOOD PRESSURE: 122 MMHG | WEIGHT: 268 LBS | DIASTOLIC BLOOD PRESSURE: 62 MMHG

## 2024-11-18 DIAGNOSIS — Z12.31 SCREENING MAMMOGRAM FOR BREAST CANCER: ICD-10-CM

## 2024-11-18 DIAGNOSIS — R10.30 LOWER ABDOMINAL PAIN: ICD-10-CM

## 2024-11-18 DIAGNOSIS — R30.9 PAINFUL URINATION: ICD-10-CM

## 2024-11-18 DIAGNOSIS — R30.0 DYSURIA: ICD-10-CM

## 2024-11-18 DIAGNOSIS — R10.30 LOWER ABDOMINAL PAIN: Primary | ICD-10-CM

## 2024-11-18 DIAGNOSIS — R10.2 PELVIC PAIN: Primary | ICD-10-CM

## 2024-11-18 DIAGNOSIS — Z80.3 FAMILY HISTORY OF BREAST CANCER: ICD-10-CM

## 2024-11-18 LAB
APPEARANCE FLUID: NORMAL
BILIRUBIN, POC: NORMAL
BLOOD URINE, POC: NORMAL
CLARITY, POC: NORMAL
COLOR, POC: YELLOW
GLUCOSE URINE, POC: NORMAL MG/DL
KETONES, POC: NORMAL MG/DL
LEUKOCYTE EST, POC: NORMAL
NITRITE, POC: NORMAL
PH, POC: 5.5
PROTEIN, POC: NORMAL MG/DL
SPECIFIC GRAVITY, POC: 1.02
UROBILINOGEN, POC: 0.2 MG/DL

## 2024-11-18 PROCEDURE — 99204 OFFICE O/P NEW MOD 45 MIN: CPT | Performed by: ADVANCED PRACTICE MIDWIFE

## 2024-11-18 PROCEDURE — G8417 CALC BMI ABV UP PARAM F/U: HCPCS | Performed by: ADVANCED PRACTICE MIDWIFE

## 2024-11-18 PROCEDURE — G8427 DOCREV CUR MEDS BY ELIG CLIN: HCPCS | Performed by: ADVANCED PRACTICE MIDWIFE

## 2024-11-18 PROCEDURE — 1036F TOBACCO NON-USER: CPT | Performed by: ADVANCED PRACTICE MIDWIFE

## 2024-11-18 PROCEDURE — 74019 RADEX ABDOMEN 2 VIEWS: CPT

## 2024-11-18 PROCEDURE — G8484 FLU IMMUNIZE NO ADMIN: HCPCS | Performed by: ADVANCED PRACTICE MIDWIFE

## 2024-11-18 RX ORDER — KETOROLAC TROMETHAMINE 30 MG/ML
30 INJECTION, SOLUTION INTRAMUSCULAR; INTRAVENOUS ONCE
Status: COMPLETED | OUTPATIENT
Start: 2024-11-18 | End: 2024-11-18

## 2024-11-18 RX ADMIN — KETOROLAC TROMETHAMINE 30 MG: 30 INJECTION, SOLUTION INTRAMUSCULAR; INTRAVENOUS at 12:08

## 2024-11-18 ASSESSMENT — ENCOUNTER SYMPTOMS
RHINORRHEA: 0
SHORTNESS OF BREATH: 0
NAUSEA: 0
WHEEZING: 0
VOMITING: 0
COUGH: 0
NAUSEA: 0
ABDOMINAL PAIN: 1
ABDOMINAL PAIN: 0
SORE THROAT: 0
DIARRHEA: 0
TROUBLE SWALLOWING: 0
VOMITING: 0
SHORTNESS OF BREATH: 0

## 2024-11-18 NOTE — PROGRESS NOTES
[facility] [address]  [address]  [facility] [address]  Dept: [phone]    Patient Name: Thania Taylor  Patient Age: 39 y.o.  Date of Visit: 2024    Subjective  Chief Complaint   Patient presents with    Establish Care     Right sided pain with sex- had hyst in 2018 still has right ovary  Last pap smear 2018- no hx of abnormals      Patient's last menstrual period was 2017.    HPI  Chaperone for Intimate Exam  Chaperone was offered as part of the rooming process. Patient declined and agrees to continue with exam without a chaperone.  Chaperone: n/a    Thania Taylor arrives as a New  patient to establish and discuss pelvic pain/ovarian cysts    Thania Taylor concern(s) today include pelvic pain believes is having recurrent cyst on right ovary. No abnormal discharge.    Gyn Hx:       hysterectomy d/t endometriosis took left ovary. Cervix removal.    Never been on birth control    Age of menstruation: 7    Thania Taylor does report a history of dysmenorrhea and heavy menstrual bleeding.     Thania Taylor is sexually active with 1 male partner(s).     Thania Taylor denies a history of sexually transmitted infections, n/a.      Mom dx w/ breast cancer <50 years   Maternal aunt breast cancer passed at 42 years old. Reported ovarian/uterine cancer  Maternal aunt breast cancer  Maternal cousin dx at age 23        8/15/2024     1:59 PM 2024     1:49 PM 6/3/2024     1:54 PM 2024     3:27 PM 2023     1:41 PM 2023    10:47 AM 3/3/2023     2:03 PM   PHQ Scores   PHQ2 Score 2 0 0 0    0 5 2 3   PHQ9 Score 9 0 11 11    11 13 5 12     Interpretation of Total Score Depression Severity: 1-4 = Minimal depression, 5-9 = Mild depression, 10-14 = Moderate depression, 15-19 = Moderately severe depression, 20-27 = Severe depression      OB History          15    Para   2    Term   2       0    AB   13

## 2024-11-18 NOTE — PROGRESS NOTES
Neurological:  Negative for dizziness and headaches.       Objective:     Physical Exam  Vitals reviewed.   Constitutional:       General: She is not in acute distress.     Appearance: Normal appearance. She is obese. She is not ill-appearing, toxic-appearing or diaphoretic.   HENT:      Head: Normocephalic.      Mouth/Throat:      Mouth: Mucous membranes are moist.      Pharynx: Oropharynx is clear.   Eyes:      Conjunctiva/sclera: Conjunctivae normal.      Pupils: Pupils are equal, round, and reactive to light.   Cardiovascular:      Rate and Rhythm: Normal rate and regular rhythm.   Pulmonary:      Effort: Pulmonary effort is normal. No respiratory distress.   Abdominal:      General: Bowel sounds are normal. There is no distension.      Palpations: Abdomen is soft.      Tenderness: There is no abdominal tenderness. There is no guarding or rebound.   Skin:     General: Skin is warm and dry.   Neurological:      Mental Status: She is alert.           MEDICAL DECISION MAKING Assessment/Plan:     Thania was seen today for urinary tract infection.    Diagnoses and all orders for this visit:    Lower abdominal pain  -     XR ABDOMEN (2 VIEWS); Future  -     ketorolac (TORADOL) injection 30 mg    Dysuria  -     Culture, Urine; Future  -     POCT Urinalysis no Micro    Painful urination  -     POCT Urinalysis no Micro        Results for orders placed or performed in visit on 11/18/24   POCT Urinalysis no Micro   Result Value Ref Range    Color, UA yellow     Clarity, UA semi cloudy     Glucose, UA POC neg mg/dL    Bilirubin, UA neg     Ketones, UA neg mg/dL    Spec Grav, UA 1.020     Blood, UA POC trace-intact     pH, UA 5.5     Protein, UA POC neg mg/dL    Urobilinogen, UA 0.2 mg/dL    Leukocytes, UA neg     Nitrite, UA neg     Appearance, Fluid Slightly Cloudy Clear, Slightly Cloudy       Patient counseled: Trace blood in urine.   Will send for culture.   If positive for infection, will send in atb at that time.

## 2024-11-19 LAB
MICROORGANISM SPEC CULT: NORMAL
SERVICE CMNT-IMP: NORMAL
SPECIMEN DESCRIPTION: NORMAL

## 2024-11-25 ENCOUNTER — HOSPITAL ENCOUNTER (OUTPATIENT)
Dept: ULTRASOUND IMAGING | Age: 39
Discharge: HOME | End: 2024-11-27
Payer: COMMERCIAL

## 2024-11-25 DIAGNOSIS — R10.2 PELVIC PAIN: ICD-10-CM

## 2024-11-25 DIAGNOSIS — N39.0 RECURRENT UTI: Primary | ICD-10-CM

## 2024-11-25 PROCEDURE — 76856 US EXAM PELVIC COMPLETE: CPT

## 2024-11-25 PROCEDURE — 76830 TRANSVAGINAL US NON-OB: CPT

## 2024-11-27 ENCOUNTER — TELEPHONE (OUTPATIENT)
Dept: OBGYN CLINIC | Age: 39
End: 2024-11-27

## 2024-11-27 DIAGNOSIS — G43.809 OTHER MIGRAINE WITHOUT STATUS MIGRAINOSUS, NOT INTRACTABLE: Primary | ICD-10-CM

## 2024-11-27 NOTE — TELEPHONE ENCOUNTER
LVM for pt to call office to schedule appt with Elza to discuss birth control  (15 min appointment). Thank you

## 2024-12-05 ENCOUNTER — APPOINTMENT (OUTPATIENT)
Dept: GENERAL RADIOLOGY | Facility: CLINIC | Age: 39
End: 2024-12-05
Payer: COMMERCIAL

## 2024-12-05 ENCOUNTER — HOSPITAL ENCOUNTER (EMERGENCY)
Facility: CLINIC | Age: 39
Discharge: HOME | End: 2024-12-05
Attending: EMERGENCY MEDICINE
Payer: COMMERCIAL

## 2024-12-05 VITALS
HEART RATE: 83 BPM | DIASTOLIC BLOOD PRESSURE: 93 MMHG | OXYGEN SATURATION: 100 % | WEIGHT: 268 LBS | HEIGHT: 63 IN | BODY MASS INDEX: 47.48 KG/M2 | RESPIRATION RATE: 17 BRPM | SYSTOLIC BLOOD PRESSURE: 144 MMHG | TEMPERATURE: 98 F

## 2024-12-05 DIAGNOSIS — T88.7XXA MEDICATION SIDE EFFECT: Primary | ICD-10-CM

## 2024-12-05 LAB
ANION GAP SERPL CALCULATED.3IONS-SCNC: 14 MMOL/L (ref 9–17)
BASOPHILS # BLD: 0 K/UL (ref 0–0.2)
BASOPHILS NFR BLD: 0 % (ref 0–2)
BUN SERPL-MCNC: 6 MG/DL (ref 6–20)
CALCIUM SERPL-MCNC: 9.7 MG/DL (ref 8.6–10.4)
CHLORIDE SERPL-SCNC: 100 MMOL/L (ref 98–107)
CO2 SERPL-SCNC: 25 MMOL/L (ref 20–31)
CREAT SERPL-MCNC: 0.5 MG/DL (ref 0.5–0.9)
EOSINOPHIL # BLD: 0.1 K/UL (ref 0–0.4)
EOSINOPHILS RELATIVE PERCENT: 1 % (ref 1–4)
ERYTHROCYTE [DISTWIDTH] IN BLOOD BY AUTOMATED COUNT: 13.8 % (ref 12.5–15.4)
GFR, ESTIMATED: >90 ML/MIN/1.73M2
GLUCOSE SERPL-MCNC: 131 MG/DL (ref 70–99)
HCT VFR BLD AUTO: 40.9 % (ref 36–46)
HGB BLD-MCNC: 14 G/DL (ref 12–16)
LYMPHOCYTES NFR BLD: 2.4 K/UL (ref 1–4.8)
LYMPHOCYTES RELATIVE PERCENT: 23 % (ref 24–44)
MCH RBC QN AUTO: 31.6 PG (ref 26–34)
MCHC RBC AUTO-ENTMCNC: 34.2 G/DL (ref 31–37)
MCV RBC AUTO: 92.4 FL (ref 80–100)
MONOCYTES NFR BLD: 0.4 K/UL (ref 0.1–1.2)
MONOCYTES NFR BLD: 4 % (ref 2–11)
NEUTROPHILS NFR BLD: 72 % (ref 36–66)
NEUTS SEG NFR BLD: 7.2 K/UL (ref 1.8–7.7)
PLATELET # BLD AUTO: 312 K/UL (ref 140–450)
PMV BLD AUTO: 8.2 FL (ref 6–12)
POTASSIUM SERPL-SCNC: 3.9 MMOL/L (ref 3.7–5.3)
RBC # BLD AUTO: 4.42 M/UL (ref 4–5.2)
SODIUM SERPL-SCNC: 139 MMOL/L (ref 135–144)
TROPONIN I SERPL HS-MCNC: <6 NG/L (ref 0–14)
WBC OTHER # BLD: 10.1 K/UL (ref 3.5–11)

## 2024-12-05 PROCEDURE — 99285 EMERGENCY DEPT VISIT HI MDM: CPT

## 2024-12-05 PROCEDURE — 71045 X-RAY EXAM CHEST 1 VIEW: CPT

## 2024-12-05 PROCEDURE — 80048 BASIC METABOLIC PNL TOTAL CA: CPT

## 2024-12-05 PROCEDURE — 84484 ASSAY OF TROPONIN QUANT: CPT

## 2024-12-05 PROCEDURE — 36415 COLL VENOUS BLD VENIPUNCTURE: CPT

## 2024-12-05 PROCEDURE — 85025 COMPLETE CBC W/AUTO DIFF WBC: CPT

## 2024-12-05 ASSESSMENT — LIFESTYLE VARIABLES
HOW MANY STANDARD DRINKS CONTAINING ALCOHOL DO YOU HAVE ON A TYPICAL DAY: PATIENT DOES NOT DRINK
HOW OFTEN DO YOU HAVE A DRINK CONTAINING ALCOHOL: NEVER

## 2024-12-05 NOTE — ED PROVIDER NOTES
[facility]    [address] [address]  Phone: [phone]  Emergency Department  Faculty Attestation    I performed a history and physical examination of the patient and discussed management with the mid level provider. I reviewed the mid level provider's note and agree with the documented findings and plan of care. Any areas of disagreement are noted on the chart. I was personally present for the key portions of any procedures. I have documented in the chart those procedures where I was not present during the key portions. I have reviewed the emergency nurses triage note. I agree with the chief complaint, past medical history, past surgical history, allergies, medications, social and family history as documented unless otherwise noted below. Documentation of the HPI, Physical Exam and Medical Decision Making performed by medical students or scribes is based on my personal performance of the HPI, PE and MDM. For Physician Assistant/ Nurse Practitioner cases/documentation I have personally evaluated this patient and have completed at least one if not all key elements of the E/M (history, physical exam, and MDM). Additional findings are as noted.      Primary Care Physician:  Juan Rizo, PASARAH    CHIEF COMPLAINT       Chief Complaint   Patient presents with    Palpitations     Starting on Tuesday after taking Prazosin on monday        RECENT VITALS:   Temp: 98 °F (36.7 °C),  Pulse: 83, Respirations: 17, BP: (!) 144/93    LABS:  Labs Reviewed   CBC WITH AUTO DIFFERENTIAL - Abnormal; Notable for the following components:       Result Value    Neutrophils % 72 (*)     Lymphocytes % 23 (*)     All other components within normal limits   BASIC METABOLIC PANEL - Abnormal; Notable for the following components:    Glucose 131 (*)     All other components within normal limits   TROPONIN        XR CHEST PORTABLE (Final result)  Result time 12/05/24 17:42:07  Final result by Carlos Gamble 
reviewed.  Constitutional: Oriented to person, place, and time and well-developed, well-nourished.   Head: Normocephalic and atraumatic.   Ear: External ears normal.   Nose: Nose normal and midline.   Eyes: Conjunctivae and EOM are normal. Pupils are equal, round, and reactive to light.   Neck: Normal range of motion.     Cardiovascular: Normal rate, regular rhythm, normal heart sounds and intact distal pulses.    Pulmonary/Chest: Effort normal and breath sounds normal. No respiratory distress. No wheezes. No rales. No chest tenderness.     Musculoskeletal: Normal range of motion.   Neurological: Alert and oriented to person, place, and time. GCS score is 15.   Skin: Skin is warm and dry. No rash noted. No erythema. No pallor.   Psychiatric: Mood, memory, affect and judgment normal.       All other labs were within normal range or not returned as of this dictation.    EMERGENCY DEPARTMENT COURSE and DIFFERENTIAL DIAGNOSIS/MDM:       1)  Number and Complexity of Problems  Problem List This Visit:   Chief Complaint   Patient presents with    Palpitations     Starting on Tuesday after taking Prazosin on monday           2)  Data Reviewed      Ekg:     LABS:  Labs Reviewed   CBC WITH AUTO DIFFERENTIAL - Abnormal; Notable for the following components:       Result Value    Neutrophils % 72 (*)     Lymphocytes % 23 (*)     All other components within normal limits   BASIC METABOLIC PANEL - Abnormal; Notable for the following components:    Glucose 131 (*)     All other components within normal limits   TROPONIN       RADIOLOGY:   All plain film, CT, MRI, and formal ultrasound images (except ED bedside ultrasound) are read by the radiologist  XR CHEST PORTABLE   Final Result   No acute process.             US PELVIS COMPLETE    Result Date: 11/25/2024  EXAMINATION: PELVIC ULTRASOUND 11/25/2024 TECHNIQUE: Transabdominal and transvaginal pelvic ultrasound was performed. COMPARISON: Pelvic ultrasound 05/16/2019, CT abdomen

## 2024-12-06 ENCOUNTER — PATIENT MESSAGE (OUTPATIENT)
Dept: PRIMARY CARE CLINIC | Age: 39
End: 2024-12-06

## 2024-12-06 ENCOUNTER — TELEPHONE (OUTPATIENT)
Dept: PRIMARY CARE CLINIC | Age: 39
End: 2024-12-06

## 2024-12-06 DIAGNOSIS — R00.2 PALPITATIONS: Primary | ICD-10-CM

## 2024-12-06 NOTE — TELEPHONE ENCOUNTER
Last Visit Date: 8/15/2024   Next Visit Date: 1/9/2025   Pt called reporting she was seen in the ER for chest pain related to a medication. Pt reports the pain has returned. Pt does have VV scheduled for Monday to discuss Holter monitor stating she was advised by the hospital to discuss need for monitor with PCP. Pt asked if she should go back to the ER, writer advised if the pain has returned she could go to be evaluated. Caller verbalized understanding.

## 2024-12-08 LAB
EKG ATRIAL RATE: 80 BPM
EKG P AXIS: -10 DEGREES
EKG P-R INTERVAL: 138 MS
EKG Q-T INTERVAL: 378 MS
EKG QRS DURATION: 84 MS
EKG QTC CALCULATION (BAZETT): 435 MS
EKG R AXIS: 72 DEGREES
EKG T AXIS: 48 DEGREES
EKG VENTRICULAR RATE: 80 BPM

## 2024-12-09 ENCOUNTER — TELEMEDICINE (OUTPATIENT)
Dept: PRIMARY CARE CLINIC | Age: 39
End: 2024-12-09
Payer: COMMERCIAL

## 2024-12-09 ENCOUNTER — HOSPITAL ENCOUNTER (OUTPATIENT)
Dept: CT IMAGING | Age: 39
Discharge: HOME | End: 2024-12-11
Attending: UROLOGY
Payer: COMMERCIAL

## 2024-12-09 DIAGNOSIS — N39.0 RECURRENT UTI: ICD-10-CM

## 2024-12-09 DIAGNOSIS — R00.2 PALPITATIONS: Primary | ICD-10-CM

## 2024-12-09 DIAGNOSIS — R42 DIZZINESS: ICD-10-CM

## 2024-12-09 PROCEDURE — 74176 CT ABD & PELVIS W/O CONTRAST: CPT

## 2024-12-09 PROCEDURE — G8427 DOCREV CUR MEDS BY ELIG CLIN: HCPCS | Performed by: NURSE PRACTITIONER

## 2024-12-09 PROCEDURE — 99214 OFFICE O/P EST MOD 30 MIN: CPT | Performed by: NURSE PRACTITIONER

## 2024-12-09 ASSESSMENT — ENCOUNTER SYMPTOMS
BACK PAIN: 0
COUGH: 0
SHORTNESS OF BREATH: 0
ABDOMINAL PAIN: 0

## 2024-12-09 NOTE — PROGRESS NOTES
vaccine (1 of 2 - 13+ 2-dose series) Never done    HIV screen  Never done    Hepatitis B vaccine (1 of 3 - 19+ 3-dose series) Never done    DTaP/Tdap/Td vaccine (1 - Tdap) Never done    Flu vaccine (1) Never done    COVID-19 Vaccine (1 - 2023-24 season) Never done    A1C test (Diabetic or Prediabetic)  06/03/2025    Depression Monitoring  08/15/2025    Hepatitis C screen  Completed    Hepatitis A vaccine  Aged Out    Hib vaccine  Aged Out    HPV vaccine  Aged Out    Polio vaccine  Aged Out    Meningococcal (ACWY) vaccine  Aged Out    Pneumococcal 0-64 years Vaccine  Aged Out    Depression Screen  Discontinued       Subjective:      Review of Systems   Constitutional:  Negative for chills, fatigue and fever.   HENT:  Negative for congestion.    Respiratory:  Negative for cough and shortness of breath.    Cardiovascular:  Positive for chest pain. Negative for palpitations.   Gastrointestinal:  Negative for abdominal pain.   Genitourinary:  Positive for difficulty urinating. Negative for dysuria.   Musculoskeletal:  Negative for back pain.   Neurological:  Positive for dizziness and headaches. Negative for numbness.   Psychiatric/Behavioral:  Negative for self-injury, sleep disturbance and suicidal ideas. The patient is nervous/anxious.        Objective:     Physical Exam  Vitals reviewed: unable to assess d/t telehealth.   Constitutional:       Appearance: Normal appearance.   Neurological:      General: No focal deficit present.      Mental Status: She is alert and oriented to person, place, and time.   Psychiatric:         Mood and Affect: Mood normal.         Behavior: Behavior normal.         Thought Content: Thought content normal.         Judgment: Judgment normal.       [facility] 09/16/2017     Assessment:       Diagnosis Orders   1. Palpitations        2. Dizziness                  Plan:   Assessment & Plan   Return if symptoms worsen or fail to improve, for as scheduled with PCP.    Palpitations/dizziness- Continue

## 2024-12-11 ENCOUNTER — HOSPITAL ENCOUNTER (OUTPATIENT)
Age: 39
Discharge: HOME | End: 2024-12-13
Payer: COMMERCIAL

## 2024-12-11 DIAGNOSIS — R00.2 PALPITATIONS: ICD-10-CM

## 2024-12-11 DIAGNOSIS — R42 DIZZINESS: ICD-10-CM

## 2024-12-11 PROCEDURE — 93225 XTRNL ECG REC<48 HRS REC: CPT

## 2024-12-12 ENCOUNTER — HOSPITAL ENCOUNTER (OUTPATIENT)
Age: 39
Setting detail: SPECIMEN
Discharge: HOME | End: 2024-12-12

## 2024-12-13 LAB
MICROORGANISM SPEC CULT: NO GROWTH
SPECIMEN DESCRIPTION: NORMAL

## 2024-12-16 ENCOUNTER — TELEPHONE (OUTPATIENT)
Dept: PRIMARY CARE CLINIC | Age: 39
End: 2024-12-16

## 2024-12-16 NOTE — TELEPHONE ENCOUNTER
The patient called asking for the referral to cardiology to be faxed to the [facility].    The fax number is [phone].    Referral faxed per patient's request.

## 2025-01-06 ENCOUNTER — HOSPITAL ENCOUNTER (OUTPATIENT)
Dept: MAMMOGRAPHY | Age: 40
Discharge: HOME | End: 2025-01-08
Payer: COMMERCIAL

## 2025-01-06 VITALS — BODY MASS INDEX: 43.71 KG/M2 | WEIGHT: 256 LBS | HEIGHT: 64 IN

## 2025-01-06 DIAGNOSIS — Z80.3 FAMILY HISTORY OF BREAST CANCER: ICD-10-CM

## 2025-01-06 DIAGNOSIS — Z12.31 SCREENING MAMMOGRAM FOR BREAST CANCER: ICD-10-CM

## 2025-01-06 PROCEDURE — 77063 BREAST TOMOSYNTHESIS BI: CPT

## 2025-01-08 DIAGNOSIS — R92.8 ABNORMAL MAMMOGRAM: Primary | ICD-10-CM

## 2025-01-09 ENCOUNTER — OFFICE VISIT (OUTPATIENT)
Dept: PRIMARY CARE CLINIC | Age: 40
End: 2025-01-09
Payer: COMMERCIAL

## 2025-01-09 VITALS
HEART RATE: 87 BPM | HEIGHT: 63 IN | BODY MASS INDEX: 46.78 KG/M2 | WEIGHT: 264 LBS | DIASTOLIC BLOOD PRESSURE: 86 MMHG | OXYGEN SATURATION: 97 % | SYSTOLIC BLOOD PRESSURE: 124 MMHG | RESPIRATION RATE: 16 BRPM

## 2025-01-09 DIAGNOSIS — R00.2 PALPITATIONS: ICD-10-CM

## 2025-01-09 DIAGNOSIS — I95.1 ORTHOSTATIC HYPOTENSION: ICD-10-CM

## 2025-01-09 DIAGNOSIS — R03.0 ELEVATED BLOOD PRESSURE READING WITHOUT DIAGNOSIS OF HYPERTENSION: Primary | ICD-10-CM

## 2025-01-09 DIAGNOSIS — F31.81 BIPOLAR 2 DISORDER (HCC): ICD-10-CM

## 2025-01-09 DIAGNOSIS — R73.03 PREDIABETES: ICD-10-CM

## 2025-01-09 PROBLEM — G47.00 INSOMNIA: Status: ACTIVE | Noted: 2024-10-21

## 2025-01-09 PROBLEM — R11.10 VOMITING: Status: ACTIVE | Noted: 2024-01-21

## 2025-01-09 PROBLEM — F41.9 ANXIETY: Status: ACTIVE | Noted: 2022-08-17

## 2025-01-09 PROBLEM — R60.9 EDEMA: Status: ACTIVE | Noted: 2024-04-18

## 2025-01-09 PROBLEM — R20.0 NUMBNESS: Status: ACTIVE | Noted: 2024-04-18

## 2025-01-09 PROBLEM — R10.9 ABDOMINAL PAIN: Status: ACTIVE | Noted: 2024-11-09

## 2025-01-09 PROCEDURE — G8427 DOCREV CUR MEDS BY ELIG CLIN: HCPCS | Performed by: PHYSICIAN ASSISTANT

## 2025-01-09 PROCEDURE — 99214 OFFICE O/P EST MOD 30 MIN: CPT | Performed by: PHYSICIAN ASSISTANT

## 2025-01-09 PROCEDURE — 1036F TOBACCO NON-USER: CPT | Performed by: PHYSICIAN ASSISTANT

## 2025-01-09 PROCEDURE — G8417 CALC BMI ABV UP PARAM F/U: HCPCS | Performed by: PHYSICIAN ASSISTANT

## 2025-01-09 RX ORDER — ASPIRIN 81 MG/1
81 TABLET, CHEWABLE ORAL DAILY
COMMUNITY

## 2025-01-09 RX ORDER — BLOOD PRESSURE TEST KIT
KIT MISCELLANEOUS
Qty: 1 KIT | Refills: 0 | Status: SHIPPED | OUTPATIENT
Start: 2025-01-09

## 2025-01-09 SDOH — ECONOMIC STABILITY: FOOD INSECURITY: WITHIN THE PAST 12 MONTHS, THE FOOD YOU BOUGHT JUST DIDN'T LAST AND YOU DIDN'T HAVE MONEY TO GET MORE.: OFTEN TRUE

## 2025-01-09 SDOH — ECONOMIC STABILITY: FOOD INSECURITY: WITHIN THE PAST 12 MONTHS, YOU WORRIED THAT YOUR FOOD WOULD RUN OUT BEFORE YOU GOT MONEY TO BUY MORE.: OFTEN TRUE

## 2025-01-09 ASSESSMENT — PATIENT HEALTH QUESTIONNAIRE - PHQ9
9. THOUGHTS THAT YOU WOULD BE BETTER OFF DEAD, OR OF HURTING YOURSELF: NOT AT ALL
7. TROUBLE CONCENTRATING ON THINGS, SUCH AS READING THE NEWSPAPER OR WATCHING TELEVISION: SEVERAL DAYS
SUM OF ALL RESPONSES TO PHQ QUESTIONS 1-9: 9
1. LITTLE INTEREST OR PLEASURE IN DOING THINGS: NOT AT ALL
3. TROUBLE FALLING OR STAYING ASLEEP: NEARLY EVERY DAY
6. FEELING BAD ABOUT YOURSELF - OR THAT YOU ARE A FAILURE OR HAVE LET YOURSELF OR YOUR FAMILY DOWN: SEVERAL DAYS
1. LITTLE INTEREST OR PLEASURE IN DOING THINGS: NOT AT ALL
SUM OF ALL RESPONSES TO PHQ9 QUESTIONS 1 & 2: 1
SUM OF ALL RESPONSES TO PHQ QUESTIONS 1-9: 9
2. FEELING DOWN, DEPRESSED OR HOPELESS: SEVERAL DAYS
SUM OF ALL RESPONSES TO PHQ QUESTIONS 1-9: 9
4. FEELING TIRED OR HAVING LITTLE ENERGY: MORE THAN HALF THE DAYS
7. TROUBLE CONCENTRATING ON THINGS, SUCH AS READING THE NEWSPAPER OR WATCHING TELEVISION: SEVERAL DAYS
3. TROUBLE FALLING OR STAYING ASLEEP: NEARLY EVERY DAY
6. FEELING BAD ABOUT YOURSELF - OR THAT YOU ARE A FAILURE OR HAVE LET YOURSELF OR YOUR FAMILY DOWN: SEVERAL DAYS
4. FEELING TIRED OR HAVING LITTLE ENERGY: MORE THAN HALF THE DAYS
5. POOR APPETITE OR OVEREATING: SEVERAL DAYS
9. THOUGHTS THAT YOU WOULD BE BETTER OFF DEAD, OR OF HURTING YOURSELF: NOT AT ALL
5. POOR APPETITE OR OVEREATING: SEVERAL DAYS
SUM OF ALL RESPONSES TO PHQ QUESTIONS 1-9: 9
SUM OF ALL RESPONSES TO PHQ QUESTIONS 1-9: 9
10. IF YOU CHECKED OFF ANY PROBLEMS, HOW DIFFICULT HAVE THESE PROBLEMS MADE IT FOR YOU TO DO YOUR WORK, TAKE CARE OF THINGS AT HOME, OR GET ALONG WITH OTHER PEOPLE: SOMEWHAT DIFFICULT
10. IF YOU CHECKED OFF ANY PROBLEMS, HOW DIFFICULT HAVE THESE PROBLEMS MADE IT FOR YOU TO DO YOUR WORK, TAKE CARE OF THINGS AT HOME, OR GET ALONG WITH OTHER PEOPLE: SOMEWHAT DIFFICULT
8. MOVING OR SPEAKING SO SLOWLY THAT OTHER PEOPLE COULD HAVE NOTICED. OR THE OPPOSITE - BEING SO FIDGETY OR RESTLESS THAT YOU HAVE BEEN MOVING AROUND A LOT MORE THAN USUAL: NOT AT ALL
2. FEELING DOWN, DEPRESSED OR HOPELESS: SEVERAL DAYS
8. MOVING OR SPEAKING SO SLOWLY THAT OTHER PEOPLE COULD HAVE NOTICED. OR THE OPPOSITE, BEING SO FIGETY OR RESTLESS THAT YOU HAVE BEEN MOVING AROUND A LOT MORE THAN USUAL: NOT AT ALL

## 2025-01-09 NOTE — PROGRESS NOTES
[facility]  [facility]  [address] [place] [address] [address]  [address]  [facility] [address]  Dept: [phone]  Dept Fax: [phone]    Thania Taylor is a 39 y.o. female who presents today for her medical conditions/complaints as noted below.    Chief Complaint   Patient presents with    6 Month Follow-Up     Would like script sent for blood pressure monitor;     Discuss Labs     For sugar levels       HPI:     Patient presents to the office for follow-up after ED and specialist.  Over the last month she has been dealing with some recurrent chest tightness, palpitations, pain.  This all stemmed from a new medication from her psychiatrist.  She reports immediately upon taking prazosin she developed palpitations and midline chest pain.  She went to the ER and had negative workup.  She was advised to stop medication.  She has been to the ER twice.  She then followed up in our office and was given a Holter monitor which was grossly negative.  Since then, patient has stopped following with her prior psychiatrist.  She is frustrated that he did not reach out to her and that she ended up with medication side effect.  [facility]  Patient is following with a cardiologist and has an appointment later today for treadmill test.  She is currently wearing an event monitor due to recurrent palpitations.  Plan is to get an echo, but insurance denied.    Patient is worried about her blood glucose and would like to be checked.  Blood pressure today is stable.          Hemoglobin A1C (%)   Date Value   06/03/2024 5.8   03/20/2023 5.8   04/19/2022 5.2             ( goal A1C is < 7)   No components found for: \"LABMICR\"  No components found for: \"LDLCHOLESTEROL\", \"LDLCALC\"    (goal LDL is <100)   AST (U/L)   Date Value   07/22/2024 43 (H)     ALT (U/L)   Date Value   07/22/2024 86 (H)     BUN (mg/dL)   Date Value   12/05/2024 6     BP Readings

## 2025-01-09 NOTE — PATIENT INSTRUCTIONS
[facility] Food Resources*  (Call [facility] for more resources)    [employer] [facility]  What they offer: Food pantry second and fourth Tuesday 4:30-6pm  Phone Number and Address: [phone] Toll Free: [employer] at [employer], between [employer] and [employer]; [address] [facility] [address]  [facility] Office on [facility]  What they offer: “Meals & Nutrition” search option on website  Phone Number and Website: [phone]; https://Mimub/  [facility]  What they offer: Dinner is open to all (must be registered and in good standing) and three hot meals a day for shelter residents. Breakfast 7-8am, Lunch 12-1pm, and Dinner 5-6pm daily.  Phone and Address: [phone]; [address] [facility] [address]  [employer] Last Mile Delivery program  What they offer: Food Box Delivery for those within [facility] who are homebound or without transportation. Applications done by phone. Qualifying individuals are eligible for 3 deliveries for the lifetime of the program.  Phone number: Call for eligibility check at 2-1-1 or 9-850-084[employer] (4873)  [facility]  What they offer: Food Pantry second and fourth Saturday 1-5pm  Phone and Address: [phone]; [address] [facility] [address]  [employer] Mobile Food Pantries   What they offer: Mobile pantries throughout the [facility]. Anyone in need may visit one pantry per month.  Phone Number and Website: For locations and schedule call 2-1-1 or 9-370-474-HELP (3847) or check the website www.feedtoledo.org  [employer] [place] [employer]  What they offer: Food Pantry, call for schedule, open to All  Phone Number: [phone]  [facility]ping [facility]  What they offer: Hot meals, Breakfast: Monday, Wednesday, Friday 8-10am, Lunch: Monday-Friday 10am-12:30pm. Food pantry (serves [address] or east of [facility]) Tuesday

## 2025-01-10 ASSESSMENT — ENCOUNTER SYMPTOMS
RHINORRHEA: 0
CHEST TIGHTNESS: 1
VOMITING: 0
DIARRHEA: 0
BACK PAIN: 0
SINUS PAIN: 0
CONSTIPATION: 0
SHORTNESS OF BREATH: 0
COUGH: 0
ABDOMINAL PAIN: 0
NAUSEA: 0

## 2025-01-12 ENCOUNTER — PATIENT MESSAGE (OUTPATIENT)
Dept: PRIMARY CARE CLINIC | Age: 40
End: 2025-01-12

## 2025-01-12 DIAGNOSIS — I95.1 ORTHOSTATIC HYPOTENSION: Primary | ICD-10-CM

## 2025-01-12 DIAGNOSIS — R00.2 PALPITATIONS: ICD-10-CM

## 2025-01-17 ENCOUNTER — PATIENT MESSAGE (OUTPATIENT)
Dept: PRIMARY CARE CLINIC | Age: 40
End: 2025-01-17

## 2025-01-17 DIAGNOSIS — G43.709 CHRONIC MIGRAINE WITHOUT AURA WITHOUT STATUS MIGRAINOSUS, NOT INTRACTABLE: Primary | ICD-10-CM

## 2025-01-17 RX ORDER — METOCLOPRAMIDE 5 MG/1
5 TABLET ORAL 4 TIMES DAILY
Qty: 30 TABLET | Refills: 0 | Status: SHIPPED | OUTPATIENT
Start: 2025-01-17

## 2025-01-21 ENCOUNTER — PATIENT MESSAGE (OUTPATIENT)
Dept: PRIMARY CARE CLINIC | Age: 40
End: 2025-01-21

## 2025-01-21 DIAGNOSIS — R21 RASH: Primary | ICD-10-CM

## 2025-01-21 DIAGNOSIS — I95.1 ORTHOSTATIC HYPOTENSION: ICD-10-CM

## 2025-01-21 DIAGNOSIS — R00.2 PALPITATIONS: ICD-10-CM

## 2025-01-23 ENCOUNTER — HOSPITAL ENCOUNTER (OUTPATIENT)
Dept: MAMMOGRAPHY | Age: 40
Discharge: HOME | End: 2025-01-25
Payer: COMMERCIAL

## 2025-01-23 ENCOUNTER — HOSPITAL ENCOUNTER (OUTPATIENT)
Dept: ULTRASOUND IMAGING | Age: 40
Discharge: HOME | End: 2025-01-25
Payer: COMMERCIAL

## 2025-01-23 DIAGNOSIS — R92.8 ABNORMAL MAMMOGRAM: ICD-10-CM

## 2025-01-23 PROCEDURE — G0279 TOMOSYNTHESIS, MAMMO: HCPCS

## 2025-01-23 PROCEDURE — 76642 ULTRASOUND BREAST LIMITED: CPT

## 2025-01-27 ENCOUNTER — HOSPITAL ENCOUNTER (OUTPATIENT)
Age: 40
Setting detail: SPECIMEN
Discharge: HOME | End: 2025-01-27

## 2025-01-27 DIAGNOSIS — R00.2 PALPITATIONS: ICD-10-CM

## 2025-01-27 DIAGNOSIS — R73.03 PREDIABETES: ICD-10-CM

## 2025-01-27 LAB
ALBUMIN SERPL-MCNC: 4.3 G/DL (ref 3.5–5.2)
ALBUMIN/GLOB SERPL: 1.3 {RATIO} (ref 1–2.5)
ALP SERPL-CCNC: 106 U/L (ref 35–104)
ALT SERPL-CCNC: 55 U/L (ref 10–35)
ANION GAP SERPL CALCULATED.3IONS-SCNC: 12 MMOL/L (ref 9–16)
AST SERPL-CCNC: 32 U/L (ref 10–35)
BILIRUB SERPL-MCNC: 0.7 MG/DL (ref 0–1.2)
BUN SERPL-MCNC: 11 MG/DL (ref 6–20)
CALCIUM SERPL-MCNC: 9.8 MG/DL (ref 8.6–10.4)
CHLORIDE SERPL-SCNC: 100 MMOL/L (ref 98–107)
CO2 SERPL-SCNC: 25 MMOL/L (ref 20–31)
CREAT SERPL-MCNC: 0.7 MG/DL (ref 0.6–0.9)
CRP SERPL HS-MCNC: 20.7 MG/L (ref 0–5)
ERYTHROCYTE [SEDIMENTATION RATE] IN BLOOD BY PHOTOMETRIC METHOD: 56 MM/HR (ref 0–20)
EST. AVERAGE GLUCOSE BLD GHB EST-MCNC: 103 MG/DL
GFR, ESTIMATED: >90 ML/MIN/1.73M2
GLUCOSE P FAST SERPL-MCNC: 114 MG/DL (ref 74–99)
HBA1C MFR BLD: 5.2 % (ref 4–6)
POTASSIUM SERPL-SCNC: 4.2 MMOL/L (ref 3.7–5.3)
PROT SERPL-MCNC: 7.7 G/DL (ref 6.6–8.7)
SODIUM SERPL-SCNC: 137 MMOL/L (ref 136–145)
T4 FREE SERPL-MCNC: 1.1 NG/DL (ref 0.92–1.68)
TSH SERPL DL<=0.05 MIU/L-ACNC: 1.56 UIU/ML (ref 0.27–4.2)

## 2025-02-03 ENCOUNTER — PATIENT MESSAGE (OUTPATIENT)
Dept: PRIMARY CARE CLINIC | Age: 40
End: 2025-02-03

## 2025-02-03 DIAGNOSIS — R42 DIZZINESS: ICD-10-CM

## 2025-02-03 DIAGNOSIS — I95.1 ORTHOSTATIC HYPOTENSION: Primary | ICD-10-CM

## 2025-02-04 NOTE — TELEPHONE ENCOUNTER
Typically Medicare and Medicaid recommend face-to-face for significant DME like a wheelchair.  Please recommend a virtual visit.

## 2025-02-13 ENCOUNTER — HOSPITAL ENCOUNTER (EMERGENCY)
Age: 40
Discharge: HOME | End: 2025-02-13
Attending: EMERGENCY MEDICINE
Payer: COMMERCIAL

## 2025-02-13 ENCOUNTER — APPOINTMENT (OUTPATIENT)
Dept: CT IMAGING | Age: 40
End: 2025-02-13
Payer: COMMERCIAL

## 2025-02-13 VITALS
OXYGEN SATURATION: 97 % | HEIGHT: 63 IN | DIASTOLIC BLOOD PRESSURE: 105 MMHG | WEIGHT: 255 LBS | SYSTOLIC BLOOD PRESSURE: 174 MMHG | RESPIRATION RATE: 18 BRPM | HEART RATE: 70 BPM | BODY MASS INDEX: 45.18 KG/M2 | TEMPERATURE: 97.9 F

## 2025-02-13 DIAGNOSIS — R11.0 NAUSEA: ICD-10-CM

## 2025-02-13 DIAGNOSIS — R10.30 LOWER ABDOMINAL PAIN: Primary | ICD-10-CM

## 2025-02-13 DIAGNOSIS — R19.7 DIARRHEA, UNSPECIFIED TYPE: ICD-10-CM

## 2025-02-13 LAB
ALBUMIN SERPL-MCNC: 4.5 G/DL (ref 3.5–5.2)
ALBUMIN/GLOB SERPL: 1.2 {RATIO} (ref 1–2.5)
ALP SERPL-CCNC: 99 U/L (ref 35–104)
ALT SERPL-CCNC: 50 U/L (ref 5–33)
ANION GAP SERPL CALCULATED.3IONS-SCNC: 12 MMOL/L (ref 9–17)
AST SERPL-CCNC: 31 U/L
BASOPHILS # BLD: 0.1 K/UL (ref 0–0.2)
BASOPHILS NFR BLD: 0 % (ref 0–2)
BILIRUB SERPL-MCNC: 0.8 MG/DL (ref 0.3–1.2)
BILIRUB UR QL STRIP: NEGATIVE
BUN SERPL-MCNC: 8 MG/DL (ref 6–20)
CALCIUM SERPL-MCNC: 10.1 MG/DL (ref 8.6–10.4)
CHLORIDE SERPL-SCNC: 100 MMOL/L (ref 98–107)
CLARITY UR: CLEAR
CO2 SERPL-SCNC: 25 MMOL/L (ref 20–31)
COLOR UR: YELLOW
COMMENT: NORMAL
CREAT SERPL-MCNC: 0.6 MG/DL (ref 0.5–0.9)
EOSINOPHIL # BLD: 0 K/UL (ref 0–0.4)
EOSINOPHILS RELATIVE PERCENT: 0 % (ref 1–4)
ERYTHROCYTE [DISTWIDTH] IN BLOOD BY AUTOMATED COUNT: 13.2 % (ref 12.5–15.4)
GFR, ESTIMATED: >90 ML/MIN/1.73M2
GLUCOSE SERPL-MCNC: 115 MG/DL (ref 70–99)
GLUCOSE UR STRIP-MCNC: NEGATIVE MG/DL
HCT VFR BLD AUTO: 43.1 % (ref 36–46)
HGB BLD-MCNC: 14.7 G/DL (ref 12–16)
HGB UR QL STRIP.AUTO: NEGATIVE
KETONES UR STRIP-MCNC: NEGATIVE MG/DL
LEUKOCYTE ESTERASE UR QL STRIP: NEGATIVE
LIPASE SERPL-CCNC: 18 U/L (ref 13–60)
LYMPHOCYTES NFR BLD: 2 K/UL (ref 1–4.8)
LYMPHOCYTES RELATIVE PERCENT: 13 % (ref 24–44)
MCH RBC QN AUTO: 31.2 PG (ref 26–34)
MCHC RBC AUTO-ENTMCNC: 34 G/DL (ref 31–37)
MCV RBC AUTO: 91.6 FL (ref 80–100)
MONOCYTES NFR BLD: 0.5 K/UL (ref 0.1–1.2)
MONOCYTES NFR BLD: 4 % (ref 2–11)
NEUTROPHILS NFR BLD: 83 % (ref 36–66)
NEUTS SEG NFR BLD: 12.3 K/UL (ref 1.8–7.7)
NITRITE UR QL STRIP: NEGATIVE
PH UR STRIP: 6.5 [PH] (ref 5–8)
PLATELET # BLD AUTO: 379 K/UL (ref 140–450)
PMV BLD AUTO: 8.5 FL (ref 6–12)
POTASSIUM SERPL-SCNC: 3.8 MMOL/L (ref 3.7–5.3)
PROT SERPL-MCNC: 8.3 G/DL (ref 6.4–8.3)
PROT UR STRIP-MCNC: NEGATIVE MG/DL
RBC # BLD AUTO: 4.7 M/UL (ref 4–5.2)
SODIUM SERPL-SCNC: 137 MMOL/L (ref 135–144)
SP GR UR STRIP: 1.01 (ref 1–1.03)
UROBILINOGEN UR STRIP-ACNC: NORMAL EU/DL (ref 0–1)
WBC OTHER # BLD: 14.9 K/UL (ref 3.5–11)

## 2025-02-13 PROCEDURE — 96375 TX/PRO/DX INJ NEW DRUG ADDON: CPT

## 2025-02-13 PROCEDURE — 83690 ASSAY OF LIPASE: CPT

## 2025-02-13 PROCEDURE — 6360000002 HC RX W HCPCS: Performed by: NURSE PRACTITIONER

## 2025-02-13 PROCEDURE — 80053 COMPREHEN METABOLIC PANEL: CPT

## 2025-02-13 PROCEDURE — 81003 URINALYSIS AUTO W/O SCOPE: CPT

## 2025-02-13 PROCEDURE — 99284 EMERGENCY DEPT VISIT MOD MDM: CPT

## 2025-02-13 PROCEDURE — 2580000003 HC RX 258: Performed by: NURSE PRACTITIONER

## 2025-02-13 PROCEDURE — 96374 THER/PROPH/DIAG INJ IV PUSH: CPT

## 2025-02-13 PROCEDURE — 85025 COMPLETE CBC W/AUTO DIFF WBC: CPT

## 2025-02-13 PROCEDURE — 74176 CT ABD & PELVIS W/O CONTRAST: CPT

## 2025-02-13 PROCEDURE — 96372 THER/PROPH/DIAG INJ SC/IM: CPT

## 2025-02-13 PROCEDURE — 96361 HYDRATE IV INFUSION ADD-ON: CPT

## 2025-02-13 RX ORDER — DICYCLOMINE HYDROCHLORIDE 10 MG/ML
20 INJECTION INTRAMUSCULAR ONCE
Status: COMPLETED | OUTPATIENT
Start: 2025-02-13 | End: 2025-02-13

## 2025-02-13 RX ORDER — 0.9 % SODIUM CHLORIDE 0.9 %
1000 INTRAVENOUS SOLUTION INTRAVENOUS ONCE
Status: COMPLETED | OUTPATIENT
Start: 2025-02-13 | End: 2025-02-13

## 2025-02-13 RX ORDER — ONDANSETRON 2 MG/ML
4 INJECTION INTRAMUSCULAR; INTRAVENOUS ONCE
Status: COMPLETED | OUTPATIENT
Start: 2025-02-13 | End: 2025-02-13

## 2025-02-13 RX ORDER — DICYCLOMINE HYDROCHLORIDE 10 MG/1
10 CAPSULE ORAL
Qty: 120 CAPSULE | Refills: 0 | Status: SHIPPED | OUTPATIENT
Start: 2025-02-13

## 2025-02-13 RX ORDER — ONDANSETRON 4 MG/1
4 TABLET, ORALLY DISINTEGRATING ORAL 3 TIMES DAILY PRN
Qty: 21 TABLET | Refills: 0 | Status: SHIPPED | OUTPATIENT
Start: 2025-02-13

## 2025-02-13 RX ORDER — KETOROLAC TROMETHAMINE 30 MG/ML
30 INJECTION, SOLUTION INTRAMUSCULAR; INTRAVENOUS ONCE
Status: COMPLETED | OUTPATIENT
Start: 2025-02-13 | End: 2025-02-13

## 2025-02-13 RX ADMIN — DICYCLOMINE HYDROCHLORIDE 20 MG: 10 INJECTION, SOLUTION INTRAMUSCULAR at 20:44

## 2025-02-13 RX ADMIN — ONDANSETRON 4 MG: 2 INJECTION INTRAMUSCULAR; INTRAVENOUS at 18:43

## 2025-02-13 RX ADMIN — SODIUM CHLORIDE 1000 ML: 9 INJECTION, SOLUTION INTRAVENOUS at 18:43

## 2025-02-13 RX ADMIN — KETOROLAC TROMETHAMINE 30 MG: 30 INJECTION, SOLUTION INTRAMUSCULAR at 18:44

## 2025-02-13 ASSESSMENT — ENCOUNTER SYMPTOMS
VOMITING: 0
DIARRHEA: 1
SORE THROAT: 0
COUGH: 0
ABDOMINAL PAIN: 1
NAUSEA: 1
BACK PAIN: 1
SHORTNESS OF BREATH: 0

## 2025-02-13 ASSESSMENT — PAIN - FUNCTIONAL ASSESSMENT: PAIN_FUNCTIONAL_ASSESSMENT: 0-10

## 2025-02-13 ASSESSMENT — PAIN SCALES - GENERAL: PAINLEVEL_OUTOF10: 8

## 2025-02-13 NOTE — ED PROVIDER NOTES
[facility] EMERGENCY DEPARTMENT  EMERGENCY DEPARTMENT ENCOUNTER      Pt Name: Thania Taylor  MRN: [id number]  Birthdate 1985  Date of evaluation: 2/13/2025  Provider: NITA Burns CNP  8:42 PM    CHIEF COMPLAINT       Chief Complaint   Patient presents with    Abdominal Pain     Pt reports pain in lower abdomin, radiating around to back, reports nausea without vomiting. Pain started this morning about 9 am         HISTORY OF PRESENT ILLNESS    Thania Taylor is a 39 y.o. female who presents to the emergency department      This is a nontoxic-appearing 39-year-old female presenting to the emergency department via private auto with male , the patient reports that she started with watery diarrhea today, the last 2 episodes of watery diarrhea have been bright red blood tinged, she has no rectal pain she does have a history of hemorrhoids, she has lower abdominal cramping pain that wraps around bilaterally to her low back, nausea but no vomiting she has had no associated fevers, no known infectious disease exposures, no recent antibiotics, no recent travel; she has not attempted any alleviating measures prior to arrival, was concerned due to the continued symptoms prompting her to come to the emergency department for evaluation.  No abnormal vaginal discharge or bleeding.  LMP: Hysterectomy.    The history is provided by the patient and medical records.       Nursing Notes were reviewed.    REVIEW OF SYSTEMS       Review of Systems   Constitutional:  Negative for chills, fatigue and fever.   HENT:  Negative for congestion and sore throat.    Respiratory:  Negative for cough and shortness of breath.    Cardiovascular:  Negative for chest pain and leg swelling.   Gastrointestinal:  Positive for abdominal pain, diarrhea and nausea. Negative for vomiting.   Genitourinary:  Negative for dysuria and hematuria.   Musculoskeletal:  Positive for back pain. Negative for neck pain.   Skin:  Negative for  Unknown (8/29/2024)    Received from [facility]    Humiliation, Afraid, Rape, and Kick questionnaire     Fear of Current or Ex-Partner: No   Housing Stability: Low Risk  (1/9/2025)    Housing Stability Vital Sign     Unable to Pay for Housing in the Last Year: No     Number of Times Moved in the Last Year: 0     Homeless in the Last Year: No       SCREENINGS                         Oumou Coma Scale  Eye Opening: Spontaneous  Best Verbal Response: Oriented  Best Motor Response: Obeys commands  Hunters Coma Scale Score: 15                     CIWA Assessment  BP: (!) 174/105  Pulse: 70                 PHYSICAL EXAM       ED Triage Vitals [02/13/25 1816]   BP Systolic BP Percentile Diastolic BP Percentile Temp Temp src Pulse Respirations SpO2   (!) 174/105 -- -- 97.9 °F (36.6 °C) -- 70 18 97 %      Height Weight - Scale         1.6 m (5' 3\") 115.7 kg (255 lb)             Physical Exam  Vitals and nursing note reviewed.   Constitutional:       General: She is not in acute distress.     Appearance: Normal appearance. She is obese. She is not ill-appearing or toxic-appearing.   HENT:      Head: Normocephalic and atraumatic.      Right Ear: External ear normal.      Left Ear: External ear normal.      Nose: Nose normal.      Mouth/Throat:      Mouth: Mucous membranes are moist.   Eyes:      General:         Right eye: No discharge.         Left eye: No discharge.      Conjunctiva/sclera: Conjunctivae normal.   Cardiovascular:      Rate and Rhythm: Normal rate and regular rhythm.      Pulses: Normal pulses.   Pulmonary:      Effort: Pulmonary effort is normal.      Breath sounds: Normal breath sounds.   Abdominal:      General: There is no distension.      Palpations: Abdomen is soft. There is no mass.      Tenderness: There is abdominal tenderness. There is no guarding.      Hernia: No hernia is present.   Musculoskeletal:         General: No deformity or signs of injury.      Cervical back: Normal range of

## 2025-02-14 ENCOUNTER — PATIENT MESSAGE (OUTPATIENT)
Dept: PRIMARY CARE CLINIC | Age: 40
End: 2025-02-14

## 2025-02-14 DIAGNOSIS — R10.9 RECURRENT ABDOMINAL PAIN: ICD-10-CM

## 2025-02-14 DIAGNOSIS — K76.0 FATTY LIVER: Primary | ICD-10-CM

## 2025-02-14 NOTE — DISCHARGE INSTRUCTIONS
The BRAT diet stands for Bananas, Rice, Applesauce, and Toast. It is a bland, easily digestible diet often recommended for people experiencing gastrointestinal upset, such as diarrhea or nausea.      Bentyl as prescribed to help with pain.    Zofran as prescribed to help with nausea or vomiting.    Stay well-hydrated.    Return to the ER: Fevers, continued worsening abdominal pain, vomiting, increasing blood in the stool; or any other concerning symptoms.

## 2025-02-14 NOTE — ED PROVIDER NOTES
[facility] Emergency Department  [address]  [facility] [address]  Phone: [phone]  Fax: [phone]      Attending Physician Attestation    Based on the medical record, the care appears appropriate. I was personally available for consultation in the Emergency Department. I did have a discussion with our midlevel provider regarding the care of this patient.  I reviewed the mid level provider's note and agree with the documented findings and plan of care.   I have reviewed the emergency nurses triage note. I agree with the chief complaint, past medical history, past surgical history, allergies, medications, social and family history as documented unless otherwise noted below.       CHIEF COMPLAINT       Chief Complaint   Patient presents with    Abdominal Pain     Pt reports pain in lower abdomin, radiating around to back, reports nausea without vomiting. Pain started this morning about 9 am         PAST MEDICAL HISTORY    has a past medical history of Anxiety, Back pain, Hyperlipidemia, Hypertension, Kidney infection, Kidney stone, Migraines, Obesity, Ovarian cyst, Prediabetes, Prolonged emergence from general anesthesia, and Syncope.    SURGICAL HISTORY      has a past surgical history that includes Tubal ligation (2012); Dilation and curettage of uterus; Endometrial ablation (2018); Hysterectomy (2018); Ulnar nerve repair (10/2022); Shoulder arthroscopy (Left, 04/19/2023); and Shoulder arthroscopy (Left, 04/19/2023).    CURRENT MEDICATIONS       Discharge Medication List as of 2/13/2025  8:39 PM        CONTINUE these medications which have NOT CHANGED    Details   metoclopramide (REGLAN) 5 MG tablet Take 1 tablet by mouth 4 times daily, Disp-30 tablet, R-0Normal      aspirin 81 MG chewable tablet Take 1 tablet by mouth dailyHistorical Med      Blood Pressure KIT Disp-1 kit, R-0, NormalMonitor blood pressure twice daily             ALLERGIES     is allergic to latex, imitrex [sumatriptan],

## 2025-02-17 ENCOUNTER — PATIENT MESSAGE (OUTPATIENT)
Dept: PRIMARY CARE CLINIC | Age: 40
End: 2025-02-17

## 2025-02-17 DIAGNOSIS — R11.10 VOMITING, UNSPECIFIED VOMITING TYPE, UNSPECIFIED WHETHER NAUSEA PRESENT: ICD-10-CM

## 2025-02-17 DIAGNOSIS — I95.1 ORTHOSTATIC HYPOTENSION: Primary | ICD-10-CM

## 2025-02-18 ENCOUNTER — PATIENT MESSAGE (OUTPATIENT)
Dept: OBGYN CLINIC | Age: 40
End: 2025-02-18

## 2025-02-18 ENCOUNTER — INITIAL CONSULT (OUTPATIENT)
Dept: ONCOLOGY | Age: 40
End: 2025-02-18

## 2025-02-18 DIAGNOSIS — Z91.89 AT HIGH RISK FOR BREAST CANCER: Primary | ICD-10-CM

## 2025-02-18 DIAGNOSIS — Z91.89 AT HIGH RISK FOR BREAST CANCER: ICD-10-CM

## 2025-02-18 DIAGNOSIS — Z80.3 FAMILY HISTORY OF BREAST CANCER: Primary | ICD-10-CM

## 2025-02-18 DIAGNOSIS — Z80.41 FAMILY HISTORY OF OVARIAN CANCER: ICD-10-CM

## 2025-02-18 DIAGNOSIS — Z80.0 FAMILY HISTORY OF COLON CANCER: ICD-10-CM

## 2025-02-18 NOTE — PROGRESS NOTES
[facility] Genetic Counseling Program   Hereditary Cancer Risk Assessment     Name: Thania Taylor   YOB: 1985   Date of Consultation: 25   Referred by:   Elza Segovia, NITA - ALEXIA  [address] [id number]    Ms. Taylor was seen at the [facility] for genetic counseling on 25.  Ms. Taylor was referred by Elza Segovia,* due to her family history of cancer.     PERSONAL HISTORY   Ms. Taylor is a 39 y.o.  female with no personal history of cancer.     She reports:     Menarche at age: 7y  First child at age: 23y  Menopause at age: 32y  Hysterectomy: 32y  Oophorectomy: 32y, right ovary still intact   Last mammogram: 25, TC = 47%   Breast density category: b - Scattered fibroglandular density  Last breast MRI: Never   Breast biopsy: Never    FAMILY HISTORY  Ms. Taylor has one son (13y) and one daughter (15y).     She has two maternal half sisters and a maternal half brother. One of the sisters was diagnosed with breast cancer in her 30s. By report she had BRCA testing which was negative; but a copy of her test report is not available to confirm.     She reports that her mother was diagnosed with breast cancer in her 30s requiring bilateral mastectomy, chemotherapy and radiation therapy. She soon after developed ovarian and uterine cancer requiring hysterectomy.     She reports two maternal aunt with breast cancer, one diagnosed in her 20s and  at age 42 the other diagnosed in her 30s. She had previously reported a third maternal aunt; however, upon further clarification it was an aunt by marriage and not biologically related to her. A maternal aunt had breast cancer in her 40s.     She reports that her father had liver cancer in his 40s, he is . A paternal aunt had breast cancer in her 30s. Her paternal grandfather had colon cancer in his 40s.     Ms. Taylor reports unknown ancestry and

## 2025-02-19 ENCOUNTER — TELEPHONE (OUTPATIENT)
Dept: ONCOLOGY | Age: 40
End: 2025-02-19

## 2025-02-19 NOTE — TELEPHONE ENCOUNTER
Pt called saying they had a referral to be scheduled with us, so I scheduled pt for a consult with Dr Martinez on 3/19/2025 @1:30

## 2025-02-20 ENCOUNTER — TELEPHONE (OUTPATIENT)
Dept: SURGERY | Age: 40
End: 2025-02-20

## 2025-02-20 NOTE — TELEPHONE ENCOUNTER
Spoke with patient to schedule a consult with Dr. Corley. Patient states she no longer needs the referral as she is seeing another physician. Writer let patient know her referral is good for one year and she can call us to schedule a consult if needed.

## 2025-02-25 ENCOUNTER — OFFICE VISIT (OUTPATIENT)
Dept: NEUROLOGY | Age: 40
End: 2025-02-25
Payer: COMMERCIAL

## 2025-02-25 ENCOUNTER — TELEPHONE (OUTPATIENT)
Dept: PRIMARY CARE CLINIC | Age: 40
End: 2025-02-25

## 2025-02-25 VITALS
HEART RATE: 74 BPM | WEIGHT: 260 LBS | BODY MASS INDEX: 46.07 KG/M2 | DIASTOLIC BLOOD PRESSURE: 86 MMHG | HEIGHT: 63 IN | SYSTOLIC BLOOD PRESSURE: 128 MMHG

## 2025-02-25 DIAGNOSIS — G90.1 DYSAUTONOMIA (HCC): ICD-10-CM

## 2025-02-25 DIAGNOSIS — G43.419 INTRACTABLE HEMIPLEGIC MIGRAINE WITHOUT STATUS MIGRAINOSUS: Primary | ICD-10-CM

## 2025-02-25 PROCEDURE — G8427 DOCREV CUR MEDS BY ELIG CLIN: HCPCS | Performed by: PHYSICIAN ASSISTANT

## 2025-02-25 PROCEDURE — 99204 OFFICE O/P NEW MOD 45 MIN: CPT | Performed by: PHYSICIAN ASSISTANT

## 2025-02-25 PROCEDURE — G8417 CALC BMI ABV UP PARAM F/U: HCPCS | Performed by: PHYSICIAN ASSISTANT

## 2025-02-25 PROCEDURE — 1036F TOBACCO NON-USER: CPT | Performed by: PHYSICIAN ASSISTANT

## 2025-02-25 RX ORDER — DIVALPROEX SODIUM 125 MG/1
125 TABLET, DELAYED RELEASE ORAL 2 TIMES DAILY
Qty: 60 TABLET | Refills: 3 | Status: SHIPPED | OUTPATIENT
Start: 2025-02-25

## 2025-02-25 RX ORDER — UBROGEPANT 50 MG/1
50 TABLET ORAL DAILY PRN
Qty: 10 TABLET | Refills: 2 | Status: SHIPPED | OUTPATIENT
Start: 2025-02-25

## 2025-02-25 NOTE — PROGRESS NOTES
vibration, proprioception on all 4 extremities      Cerebellar Intact fine motor movement. No involuntary movements or tremors. No ataxia or dysmetria on finger to nose or heel to shin testing      Reflex function DTR 2+ on bilateral UE and LE, symmetric.       Gait                   normal base and arm swing        ASSESSMENT / PLAN:   Thania Taylor is a 39 y.o. female that established care with neurology for hemiplegic migraine and dysautonomia.    She has failed metoprolol due to worsening headaches. She has failed multiple antidepressant medications due to severe laney from her psychiatrist and also should not be started on amitriptyline as this would worsen her dysautonomia. Topiramate cannot be used due to history of kidney stones. Will trial Depakote VERY conservatively at 125 mg BID and if failed, Qulipta is next.     Similarly, Triptans are contraindicated in this migraine variant and I would also avoid this class due to ongoing cardiac workup and severe symptoms of dysautonomia.       1. 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 Dysautonomia (HCC)  -     MRI BRAIN W WO CONTRAST; Future  -     Vitamin B12 & Folate; Future  -     Vitamin B6; Future  -     Magnesium; Future         ANNE Hamilton     [facility]

## 2025-02-25 NOTE — TELEPHONE ENCOUNTER
Last appt 01/09/25  Next appt 02/26/25    Pt unable to come in for appt tomorrow on 02/26/25 at 12:30 pm.    Is able to do virtual if this is okay with PCP?

## 2025-02-26 ENCOUNTER — TELEMEDICINE (OUTPATIENT)
Dept: PRIMARY CARE CLINIC | Age: 40
End: 2025-02-26

## 2025-02-26 DIAGNOSIS — F31.81 BIPOLAR 2 DISORDER (HCC): ICD-10-CM

## 2025-02-26 DIAGNOSIS — G43.419 INTRACTABLE HEMIPLEGIC MIGRAINE WITHOUT STATUS MIGRAINOSUS: ICD-10-CM

## 2025-02-26 DIAGNOSIS — G90.1 DYSAUTONOMIA (HCC): Primary | ICD-10-CM

## 2025-02-26 DIAGNOSIS — I95.1 ORTHOSTATIC HYPOTENSION: ICD-10-CM

## 2025-02-26 ASSESSMENT — ENCOUNTER SYMPTOMS
CONSTIPATION: 0
RHINORRHEA: 0
SINUS PAIN: 0
NAUSEA: 0
SHORTNESS OF BREATH: 0
ABDOMINAL PAIN: 0
VOMITING: 0
BACK PAIN: 1
DIARRHEA: 0
COUGH: 0

## 2025-02-26 NOTE — PROGRESS NOTES
2025    TELEHEALTH EVALUATION -- Audio/Visual    HPI:    Thania Taylor (:  1985) has requested an audio/video evaluation for the following concern(s):    Patient presents as a virtual visit for discussion on wheelchair and her chronic conditions.  Patient is following with multiple specialist including neurology, psychiatry, cardiology, rheumatology.  At this current time diagnosis is dysautonomia contributing to lightheadedness, syncopal events.  She has upcoming echo.  She failed a stress test due to passing out.  She has been on metoprolol which unfortunately increased her migraines.  She is following with a new neurologist who is starting Depakote for hemiplegic migraines.    Psychiatry has recently prescribed Vraylar and lamotrigine for bipolar disorder.  She has not started these as she is worried about interactions with her heart.  She is waiting for response from cardiology.    Vitals unavailable.    Review of Systems   Constitutional:  Positive for fatigue. Negative for chills and fever.   HENT:  Negative for congestion, rhinorrhea and sinus pain.    Respiratory:  Negative for cough and shortness of breath.    Cardiovascular:  Negative for chest pain and leg swelling.   Gastrointestinal:  Negative for abdominal pain, constipation, diarrhea, nausea and vomiting.   Genitourinary:  Negative for difficulty urinating, frequency and urgency.   Musculoskeletal:  Positive for arthralgias, back pain and myalgias.   Neurological:  Positive for dizziness, light-headedness and headaches.   Psychiatric/Behavioral:  Positive for dysphoric mood and sleep disturbance. Negative for confusion. The patient is nervous/anxious.    All other systems reviewed and are negative.      Prior to Visit Medications    Medication Sig Taking? Authorizing Provider   divalproex (DEPAKOTE) 125 MG DR tablet Take 1 tablet by mouth 2 times daily  Shabana Johnson PA   Ubrogepant (UBRELVY) 50 MG TABS Take 50 mg by mouth daily as

## 2025-02-27 ENCOUNTER — TELEPHONE (OUTPATIENT)
Dept: NEUROLOGY | Age: 40
End: 2025-02-27

## 2025-03-03 ENCOUNTER — PATIENT MESSAGE (OUTPATIENT)
Dept: NEUROLOGY | Age: 40
End: 2025-03-03

## 2025-03-03 DIAGNOSIS — G43.419 INTRACTABLE HEMIPLEGIC MIGRAINE WITHOUT STATUS MIGRAINOSUS: Primary | ICD-10-CM

## 2025-03-03 NOTE — TELEPHONE ENCOUNTER
If patient is okay with this plan, I'll send it in. Also if she has any severe rash or facial swelling with the itching, she should go to the ED

## 2025-03-04 ENCOUNTER — HOSPITAL ENCOUNTER (OUTPATIENT)
Age: 40
Setting detail: SPECIMEN
Discharge: HOME | End: 2025-03-04

## 2025-03-04 ENCOUNTER — HOSPITAL ENCOUNTER (OUTPATIENT)
Dept: MRI IMAGING | Age: 40
Discharge: HOME | End: 2025-03-06
Payer: COMMERCIAL

## 2025-03-04 DIAGNOSIS — G90.1 DYSAUTONOMIA (HCC): ICD-10-CM

## 2025-03-04 DIAGNOSIS — G43.419 INTRACTABLE HEMIPLEGIC MIGRAINE WITHOUT STATUS MIGRAINOSUS: ICD-10-CM

## 2025-03-04 LAB
FOLATE SERPL-MCNC: 6.6 NG/ML (ref 4.8–24.2)
MAGNESIUM SERPL-MCNC: 2 MG/DL (ref 1.6–2.6)
VIT B12 SERPL-MCNC: 590 PG/ML (ref 232–1245)

## 2025-03-04 PROCEDURE — 2500000003 HC RX 250 WO HCPCS: Performed by: PHYSICIAN ASSISTANT

## 2025-03-04 PROCEDURE — A9579 GAD-BASE MR CONTRAST NOS,1ML: HCPCS | Performed by: PHYSICIAN ASSISTANT

## 2025-03-04 PROCEDURE — 6360000004 HC RX CONTRAST MEDICATION: Performed by: PHYSICIAN ASSISTANT

## 2025-03-04 RX ORDER — ATOGEPANT 30 MG/1
30 TABLET ORAL DAILY
Qty: 30 TABLET | Refills: 1 | Status: SHIPPED | OUTPATIENT
Start: 2025-03-04 | End: 2025-06-02

## 2025-03-04 RX ORDER — SODIUM CHLORIDE 0.9 % (FLUSH) 0.9 %
10 SYRINGE (ML) INJECTION ONCE
Status: DISCONTINUED | OUTPATIENT
Start: 2025-03-04 | End: 2025-03-07 | Stop reason: HOSPADM

## 2025-03-05 ENCOUNTER — TELEPHONE (OUTPATIENT)
Dept: ONCOLOGY | Age: 40
End: 2025-03-05

## 2025-03-05 DIAGNOSIS — Z91.89 AT HIGH RISK FOR BREAST CANCER: Primary | ICD-10-CM

## 2025-03-05 DIAGNOSIS — Z15.89 GENETIC PREDISPOSITION TO DISEASE: ICD-10-CM

## 2025-03-05 NOTE — TELEPHONE ENCOUNTER
Left message for Thania notifying her that her genetic test results are available. Requested that she return my phone call at her earliest convenience to review results.

## 2025-03-06 NOTE — TELEPHONE ENCOUNTER
[facility] Genetic Counseling Program   Hereditary Cancer Risk Assessment     Name: Thania Taylor  YOB: 1985  Date of Results Disclosure: 03/06/25      HISTORY   Ms. Taylor was seen for genetic counseling at the request of Elza Segovia APRN - CNM due to her family history of cancer and to discuss her risk factors for breast cancer.  At that time, Ms. Taylor chose to pursue genetic testing via the CancerNext Expanded + RNA hereditary cancer gene panel. These results were discussed with Ms. Taylor via telephone. A summary of Ms. Taylor's results and recommendations are below.     RESULTS  GIDEEN CancerNext-Expanded Panel + RNAinsight: NEGATIVE - NO CLINICALLY SIGNIFICANT MUTATIONS DETECTED   This panel included the analysis of 90 genes associated with hereditary cancer including: AIP, ALK, APC, JOSH, BAP1, BARD1, BMPR1A, BRCA1, BRCA2, BRIP1, CDC73, CDH1, CDK4, CDKN1B, CDKN2A, CEBPA, CFTR, CHEK2, DICER1, ETV6, FH, FLCN, GATA2, KIF1B, LZTR1, MAX, MEN1, MET, MLH1, MSH2, MSH6, MUTYH, NF1, NF2, NTHL1, PALB2, PHOX2B, PMS2, POT1, JPYYI5F, PTCH1, PTEN, RAD51C, RAD51D, RB1, RET, RUNX1, SDHA, SDHAF2, SDHB, SDHC, SDHD, SMAD4, SMARCA4, SMARCB1, SMARCE1, STK11, SUFU, UOOU582, TP53, TSC1, TSC2, VHL and WT1 (sequencing and deletion/duplication); ATRIP, AXIN2, CPA1, CTNNA1, CTRC, DDX41, EGFR, EGLN1, HOXB13, KIT, MBD4, MITF, MLH3, MSH3, PALLD, PDGFRA, POLD1, POLE, PRSS1, RAD51B, RNF43, RPS20, SPINK1 and TERT (sequencing only); EPCAM and GREM1 (deletion/duplication only). RNA data is routinely analyzed for use in variant interpretation for all genes.  Please refer to genetic test report for technical details.    We discussed that Ms. Taylor's negative test result greatly reduces the likelihood that she carries a hereditary gene mutation. However, it is possible that her family history of cancer is due to a hereditary mutation which she did not inherit.  It is also possible that her family history

## 2025-03-07 LAB — PYRIDOXAL PHOS SERPL-SCNC: 19.7 NMOL/L (ref 20–125)

## 2025-03-10 ENCOUNTER — RESULTS FOLLOW-UP (OUTPATIENT)
Dept: NEUROLOGY | Age: 40
End: 2025-03-10

## 2025-03-10 RX ORDER — FREMANEZUMAB-VFRM 225 MG/1.5ML
1.5 INJECTION SUBCUTANEOUS
Qty: 1.5 ML | Refills: 2 | Status: SHIPPED | OUTPATIENT
Start: 2025-03-10

## 2025-03-10 NOTE — TELEPHONE ENCOUNTER
Does patient have a contraindication to Aimovig, Emgality or Ajovy? Per insurance patient must have a trial of at least 30 days.     Please advise.

## 2025-03-10 NOTE — TELEPHONE ENCOUNTER
Patient is agreeable to trying the Ajovy. Prescription attached and set to pharmacy of patient's choice.

## 2025-03-11 ENCOUNTER — HOSPITAL ENCOUNTER (OUTPATIENT)
Dept: MRI IMAGING | Age: 40
Discharge: HOME | End: 2025-03-13
Payer: COMMERCIAL

## 2025-03-11 PROCEDURE — 70553 MRI BRAIN STEM W/O & W/DYE: CPT

## 2025-03-11 PROCEDURE — A9579 GAD-BASE MR CONTRAST NOS,1ML: HCPCS | Performed by: PHYSICIAN ASSISTANT

## 2025-03-11 PROCEDURE — 6360000004 HC RX CONTRAST MEDICATION: Performed by: PHYSICIAN ASSISTANT

## 2025-03-11 PROCEDURE — 2500000003 HC RX 250 WO HCPCS: Performed by: PHYSICIAN ASSISTANT

## 2025-03-11 RX ORDER — SODIUM CHLORIDE 0.9 % (FLUSH) 0.9 %
10 SYRINGE (ML) INJECTION PRN
Status: DISCONTINUED | OUTPATIENT
Start: 2025-03-11 | End: 2025-03-14 | Stop reason: HOSPADM

## 2025-03-11 RX ADMIN — SODIUM CHLORIDE, PRESERVATIVE FREE 10 ML: 5 INJECTION INTRAVENOUS at 10:49

## 2025-03-11 RX ADMIN — GADOTERIDOL 20 ML: 279.3 INJECTION, SOLUTION INTRAVENOUS at 10:48

## 2025-03-12 ENCOUNTER — PATIENT MESSAGE (OUTPATIENT)
Dept: NEUROLOGY | Age: 40
End: 2025-03-12

## 2025-03-25 ENCOUNTER — TELEPHONE (OUTPATIENT)
Dept: NEUROLOGY | Age: 40
End: 2025-03-25

## 2025-03-25 ENCOUNTER — PATIENT MESSAGE (OUTPATIENT)
Dept: NEUROLOGY | Age: 40
End: 2025-03-25

## 2025-03-31 ENCOUNTER — TELEPHONE (OUTPATIENT)
Dept: SURGERY | Age: 40
End: 2025-03-31

## 2025-03-31 NOTE — TELEPHONE ENCOUNTER
Left voicemail for patient to call the office to reschedule her 04/03 appt to the next available due to Dr. Corley being out for the week.

## 2025-04-09 NOTE — TELEPHONE ENCOUNTER
Writer called the patient today regarding her message and the [facility] paperwork we received.  Patient stated that she is scheduled for her tilt table test tomorrow at [facility].  She also has a meeting with her HR department at work on Friday and feels like she may need to quit her job and not need the paperwork filled out.  Patient stated that she will call after the meeting to let our office know.  Currently cardiology has her off work until May 31st per patient.

## 2025-04-11 ENCOUNTER — PATIENT MESSAGE (OUTPATIENT)
Dept: NEUROLOGY | Age: 40
End: 2025-04-11

## 2025-04-13 ENCOUNTER — PATIENT MESSAGE (OUTPATIENT)
Dept: PRIMARY CARE CLINIC | Age: 40
End: 2025-04-13

## 2025-04-13 DIAGNOSIS — G90.1 DYSAUTONOMIA (HCC): Primary | ICD-10-CM

## 2025-04-13 DIAGNOSIS — I95.1 ORTHOSTATIC HYPOTENSION: ICD-10-CM

## 2025-04-16 ENCOUNTER — RESULTS FOLLOW-UP (OUTPATIENT)
Dept: NEUROLOGY | Age: 40
End: 2025-04-16

## 2025-04-21 ENCOUNTER — TELEPHONE (OUTPATIENT)
Dept: GASTROENTEROLOGY | Age: 40
End: 2025-04-21

## 2025-04-21 ENCOUNTER — OFFICE VISIT (OUTPATIENT)
Dept: SURGERY | Age: 40
End: 2025-04-21
Payer: COMMERCIAL

## 2025-04-21 VITALS
WEIGHT: 260 LBS | SYSTOLIC BLOOD PRESSURE: 143 MMHG | HEART RATE: 82 BPM | DIASTOLIC BLOOD PRESSURE: 99 MMHG | BODY MASS INDEX: 46.07 KG/M2 | HEIGHT: 63 IN

## 2025-04-21 DIAGNOSIS — Z91.89 AT HIGH RISK FOR BREAST CANCER: Primary | ICD-10-CM

## 2025-04-21 PROCEDURE — 1036F TOBACCO NON-USER: CPT | Performed by: SURGERY

## 2025-04-21 PROCEDURE — G8427 DOCREV CUR MEDS BY ELIG CLIN: HCPCS | Performed by: SURGERY

## 2025-04-21 PROCEDURE — 99203 OFFICE O/P NEW LOW 30 MIN: CPT | Performed by: SURGERY

## 2025-04-21 PROCEDURE — G8417 CALC BMI ABV UP PARAM F/U: HCPCS | Performed by: SURGERY

## 2025-04-21 RX ORDER — HYDROXYZINE PAMOATE 25 MG/1
25 CAPSULE ORAL NIGHTLY
COMMUNITY
Start: 2024-11-27 | End: 2025-04-21

## 2025-04-21 RX ORDER — MIRTAZAPINE 15 MG/1
TABLET, FILM COATED ORAL
COMMUNITY
Start: 2024-12-11 | End: 2025-04-21

## 2025-04-21 RX ORDER — AMLODIPINE BESYLATE 2.5 MG/1
2.5 TABLET ORAL DAILY
COMMUNITY
Start: 2025-03-05 | End: 2025-04-21

## 2025-04-21 RX ORDER — METOPROLOL SUCCINATE 50 MG/1
50 TABLET, EXTENDED RELEASE ORAL DAILY
COMMUNITY
Start: 2025-01-14 | End: 2025-04-21

## 2025-04-21 RX ORDER — PRAZOSIN HYDROCHLORIDE 1 MG/1
1 CAPSULE ORAL NIGHTLY
COMMUNITY
Start: 2024-11-27 | End: 2025-04-21

## 2025-04-21 RX ORDER — QUETIAPINE FUMARATE 50 MG/1
TABLET, FILM COATED ORAL EVERY 24 HOURS
COMMUNITY
Start: 2024-12-05 | End: 2025-04-21

## 2025-04-21 RX ORDER — PHENAZOPYRIDINE HYDROCHLORIDE 200 MG/1
TABLET, FILM COATED ORAL
COMMUNITY
Start: 2024-11-09 | End: 2025-04-21

## 2025-04-21 RX ORDER — AMOXICILLIN 250 MG
2 CAPSULE ORAL NIGHTLY
COMMUNITY
Start: 2025-03-13 | End: 2025-07-11

## 2025-04-21 RX ORDER — DESVENLAFAXINE 50 MG/1
TABLET, FILM COATED, EXTENDED RELEASE ORAL
COMMUNITY
Start: 2024-12-15 | End: 2025-04-21

## 2025-04-21 RX ORDER — DESVENLAFAXINE 25 MG/1
TABLET, EXTENDED RELEASE ORAL EVERY 24 HOURS
COMMUNITY
Start: 2024-12-05 | End: 2025-04-21

## 2025-04-21 RX ORDER — POLYETHYLENE GLYCOL 3350, SODIUM SULFATE ANHYDROUS, SODIUM BICARBONATE, SODIUM CHLORIDE, POTASSIUM CHLORIDE 236; 22.74; 6.74; 5.86; 2.97 G/4L; G/4L; G/4L; G/4L; G/4L
POWDER, FOR SOLUTION ORAL
COMMUNITY
Start: 2025-02-25

## 2025-04-21 RX ORDER — DOCUSATE SODIUM 100 MG/1
100 CAPSULE, LIQUID FILLED ORAL 2 TIMES DAILY
COMMUNITY
Start: 2025-02-25

## 2025-04-21 RX ORDER — HYDROCORTISONE 25 MG/G
CREAM TOPICAL
COMMUNITY
Start: 2025-03-13 | End: 2025-04-21

## 2025-04-21 RX ORDER — CARIPRAZINE 1.5 MG/1
1.5 CAPSULE, GELATIN COATED ORAL DAILY
COMMUNITY
Start: 2025-03-05 | End: 2025-04-21

## 2025-04-21 RX ORDER — QUETIAPINE FUMARATE 100 MG/1
TABLET, FILM COATED ORAL
COMMUNITY
Start: 2024-12-15 | End: 2025-04-21

## 2025-04-21 RX ORDER — CEPHALEXIN 500 MG/1
CAPSULE ORAL
COMMUNITY
Start: 2024-11-09 | End: 2025-04-21

## 2025-04-21 RX ORDER — PANTOPRAZOLE SODIUM 40 MG/1
40 TABLET, DELAYED RELEASE ORAL DAILY
COMMUNITY
Start: 2025-03-13

## 2025-04-21 RX ORDER — CHLORHEXIDINE GLUCONATE ORAL RINSE 1.2 MG/ML
SOLUTION DENTAL
COMMUNITY
Start: 2024-05-09 | End: 2025-04-21

## 2025-04-21 RX ORDER — LAMOTRIGINE 25 MG/1
TABLET ORAL
COMMUNITY
Start: 2025-04-14 | End: 2025-04-21

## 2025-04-21 RX ORDER — PROPRANOLOL HYDROCHLORIDE 10 MG/1
TABLET ORAL
COMMUNITY
Start: 2025-04-14 | End: 2025-04-21

## 2025-04-21 RX ORDER — NITROFURANTOIN 25; 75 MG/1; MG/1
CAPSULE ORAL EVERY 24 HOURS
COMMUNITY
Start: 2024-12-05 | End: 2025-04-21

## 2025-04-21 RX ORDER — POLYETHYLENE GLYCOL 3350 17 G/17G
POWDER, FOR SOLUTION ORAL
COMMUNITY
Start: 2025-02-25

## 2025-04-21 NOTE — TELEPHONE ENCOUNTER
Attempt 1 - pt had an appt on 4-14-25 and cancelled  Attempt 2 - LVM for GI appt  Attempt 3 - sent letter

## 2025-04-21 NOTE — PROGRESS NOTES
Review of Systems   Constitutional:  Negative for activity change, appetite change, chills, diaphoresis, fatigue, fever and unexpected weight change.   Respiratory:  Negative for apnea, cough, chest tightness, shortness of breath, wheezing and stridor.    Cardiovascular:  Negative for chest pain and leg swelling.   Gastrointestinal:  Negative for abdominal distention, abdominal pain, anal bleeding, blood in stool, constipation, diarrhea, nausea, rectal pain and vomiting.   Genitourinary:  Negative for difficulty urinating, dysuria, enuresis, flank pain, frequency, hematuria and urgency.   Musculoskeletal:  Negative for back pain.   Skin:  Negative for color change and pallor.   Allergic/Immunologic: Negative for food allergies and immunocompromised state.   Neurological:  Negative for syncope, speech difficulty, weakness, light-headedness, numbness and headaches.   Hematological:  Negative for adenopathy. Does not bruise/bleed easily.   Psychiatric/Behavioral:  The patient is not nervous/anxious.    All other systems reviewed and are negative.

## 2025-04-21 NOTE — PROGRESS NOTES
Patient's Name/Date of Birth: Thania Taylor / 1985 (39 y.o.)    Date: April 21, 2025    HPI: Pt is a 39 y.o. female who presents to [facility] for evaluation of high risk of breast cancer.  Pt had diagnostic mammogram and ultrasound which demonstrated dense fibroglandular tissue without suspicious masses or abnormalities. Pt has family history of sister, mother and maternal aunts with breast cancer. Her original Tyrer cuzack score was calculated at 47%, this was recalculated to be 26%. Pt presents today to discuss different options and would like to begin the process of bilateral mastectomy with reconstruction. Pt vapes daily with 3% nicotine vap pens. She was recently diagnosed with dysautonomia and is undergoing further cardiac work-up for this. She reports high sensitivity to medications and is seeing an allergy specialist in the next few weeks. She has history of hysterectomy with unilateral oophorectomy.     Menarche at age: 7y  First child at age: 23y  Menopause at age: 32y  Hysterectomy: 32y  Oophorectomy: 32y, right ovary still intact     Family history: maternal sister with breast cancer BRCA negative, mother with breast cancer (BRCA negative), mother with ovarian and uterine cancer, two maternal aunts with breast cancer, father with liver cancer    Physical Exam:  Vitals:    04/21/25 1102   BP: (!) 143/99   Pulse: 82     General:A & O x3  HEENT:  NCAT  BREAST: The bilateral breasts are pendulous, symmetric with no skin changes, nipple deformity, discharge, or masses.  There is no tenderness to palpation.  Lymph Nodes:  There is no lymphadenopathy in the supraclavicular, infraclavicular, or axillary areas bilaterally.  Extremity: Normal, without deformities, edema, or skin discoloration  SKIN: Skin color, texture, turgor normal. No rashes or lesions.    Assessment/Plan:  1. At high risk for breast cancer        Thania Taylor is a 39 y.o. female that is seen today for high overall

## 2025-04-24 ENCOUNTER — TELEPHONE (OUTPATIENT)
Dept: BARIATRICS/WEIGHT MGMT | Age: 40
End: 2025-04-24

## 2025-04-24 NOTE — TELEPHONE ENCOUNTER
Online Info Session Completed:  on 4/23/25 okay to schedule with Dr. Valentin    Verified Insurance Benefit   with [facility]    Patient informed the following:    This is NOT a guarantee of payment  When stating that you have a “Benefit” or “Coverage” for Bariatric Surgery - that means that you may qualify for the surgery  Bariatric Surgery is considered an elective procedure, patient is responsible to know their benefits . Any information we obtain when calling your insurance  is not  a guarantee of  coverage  and/or  benefit.      Appointment Note :   New Patient , [facility],   3   month visits,  PG Fee $200,  Mailed Packet or advised to arrive  early      Remind Patient of $200 Program fee with $ 100 required at Second visit with office on initial dietician visit.     Remind Patient they must be nicotine free. They will be tested at the beginning of the program and prior to surgery.      Advise Patient Responsible for out of pocket, copay at medical visits, Deductible and coinsurance applied to medical visits and procedure.    You will be responsible for any of the following:  Copays   Deductibles   Co insurances     The items mentioned above are indicated or required by your insurance plan. Your deductible and coinsurance are applied to medical visits and procedures.       Verified with patient if he or she has had any previous bariatric surgery? no  ( If yes ,advise patient of transfer of care process and program fee)       .

## 2025-04-28 ENCOUNTER — PATIENT MESSAGE (OUTPATIENT)
Dept: NEUROLOGY | Age: 40
End: 2025-04-28

## 2025-04-29 NOTE — TELEPHONE ENCOUNTER
Writer placed a call to the patient and explained that Shabana would need to see her to document the need for the electric wheelchair.  Writer offered a sooner appointment at our [facility] [address] location for this Monday, May 5th and patient was agreeable.  Address provided to the patient.

## 2025-05-05 ENCOUNTER — OFFICE VISIT (OUTPATIENT)
Dept: NEUROLOGY | Age: 40
End: 2025-05-05
Payer: COMMERCIAL

## 2025-05-05 VITALS
DIASTOLIC BLOOD PRESSURE: 84 MMHG | SYSTOLIC BLOOD PRESSURE: 135 MMHG | HEIGHT: 63 IN | WEIGHT: 267 LBS | HEART RATE: 85 BPM | BODY MASS INDEX: 47.31 KG/M2

## 2025-05-05 DIAGNOSIS — G43.419 INTRACTABLE HEMIPLEGIC MIGRAINE WITHOUT STATUS MIGRAINOSUS: Primary | ICD-10-CM

## 2025-05-05 DIAGNOSIS — R55 SYNCOPE, UNSPECIFIED SYNCOPE TYPE: ICD-10-CM

## 2025-05-05 DIAGNOSIS — G90.1 DYSAUTONOMIA (HCC): ICD-10-CM

## 2025-05-05 PROCEDURE — 99214 OFFICE O/P EST MOD 30 MIN: CPT | Performed by: PHYSICIAN ASSISTANT

## 2025-05-05 PROCEDURE — 1036F TOBACCO NON-USER: CPT | Performed by: PHYSICIAN ASSISTANT

## 2025-05-05 PROCEDURE — G8417 CALC BMI ABV UP PARAM F/U: HCPCS | Performed by: PHYSICIAN ASSISTANT

## 2025-05-05 PROCEDURE — G8427 DOCREV CUR MEDS BY ELIG CLIN: HCPCS | Performed by: PHYSICIAN ASSISTANT

## 2025-05-05 RX ORDER — UBROGEPANT 100 MG/1
100 TABLET ORAL DAILY PRN
Qty: 16 TABLET | Refills: 5 | Status: SHIPPED | OUTPATIENT
Start: 2025-05-05

## 2025-05-05 RX ORDER — CYCLOBENZAPRINE HCL 10 MG
TABLET ORAL
COMMUNITY
Start: 2025-04-30

## 2025-05-07 ENCOUNTER — HOSPITAL ENCOUNTER (OUTPATIENT)
Age: 40
Setting detail: SPECIMEN
Discharge: HOME | End: 2025-05-07

## 2025-05-08 LAB
AVOCADO IGE QN: <0.1 KU/L (ref 0–0.34)
BANANA IGE QN: <0.1 KU/L (ref 0–0.34)
CHESTNUT IGE QN: <0.1 KU/L (ref 0–0.34)
ENA SS-A IGG SER QL: <0.3 U/ML
ENA SS-B IGG SER IA-ACNC: <0.3 U/ML
KIWIFRUIT IGE QN: <0.1 KU/L (ref 0–0.34)
LATEX IGE: <0.1 KU/L (ref 0–0.34)

## 2025-05-09 ENCOUNTER — TELEPHONE (OUTPATIENT)
Dept: NEUROLOGY | Age: 40
End: 2025-05-09

## 2025-05-09 LAB — TRYPTASE SERPL-MCNC: 7 UG/L

## 2025-05-13 ENCOUNTER — PATIENT MESSAGE (OUTPATIENT)
Dept: NEUROLOGY | Age: 40
End: 2025-05-13

## 2025-05-13 NOTE — TELEPHONE ENCOUNTER
This is an excellent question although from what I can find, there's not a certain product recommended over others- opioid medications can worsen dysautonomia symptoms. Unfortunately because I don't prescribe these medications, I'm not familiar enough to make a specific recommendation. I would inform the surgeon of they SE to the medication and see what alternatives they recommend.

## 2025-05-16 ENCOUNTER — HOSPITAL ENCOUNTER (EMERGENCY)
Age: 40
Discharge: HOME | End: 2025-05-17
Attending: EMERGENCY MEDICINE
Payer: COMMERCIAL

## 2025-05-16 ENCOUNTER — APPOINTMENT (OUTPATIENT)
Dept: GENERAL RADIOLOGY | Age: 40
End: 2025-05-16
Payer: COMMERCIAL

## 2025-05-16 DIAGNOSIS — B34.9 VIRAL ILLNESS: Primary | ICD-10-CM

## 2025-05-16 LAB
FLUAV AG SPEC QL: NEGATIVE
FLUBV AG SPEC QL: NEGATIVE
SARS-COV-2 RDRP RESP QL NAA+PROBE: NOT DETECTED
SPECIMEN DESCRIPTION: NORMAL

## 2025-05-16 PROCEDURE — 2580000003 HC RX 258

## 2025-05-16 PROCEDURE — 6370000000 HC RX 637 (ALT 250 FOR IP)

## 2025-05-16 PROCEDURE — 99284 EMERGENCY DEPT VISIT MOD MDM: CPT

## 2025-05-16 PROCEDURE — 87804 INFLUENZA ASSAY W/OPTIC: CPT

## 2025-05-16 PROCEDURE — 87635 SARS-COV-2 COVID-19 AMP PRB: CPT

## 2025-05-16 PROCEDURE — 71045 X-RAY EXAM CHEST 1 VIEW: CPT

## 2025-05-16 RX ORDER — 0.9 % SODIUM CHLORIDE 0.9 %
1000 INTRAVENOUS SOLUTION INTRAVENOUS ONCE
Status: COMPLETED | OUTPATIENT
Start: 2025-05-16 | End: 2025-05-17

## 2025-05-16 RX ORDER — ONDANSETRON 4 MG/1
4 TABLET, ORALLY DISINTEGRATING ORAL EVERY 8 HOURS PRN
Qty: 10 TABLET | Refills: 0 | Status: SHIPPED | OUTPATIENT
Start: 2025-05-16

## 2025-05-16 RX ORDER — ACETAMINOPHEN 500 MG
1000 TABLET ORAL ONCE
Status: COMPLETED | OUTPATIENT
Start: 2025-05-16 | End: 2025-05-16

## 2025-05-16 RX ADMIN — ACETAMINOPHEN 1000 MG: 500 TABLET ORAL at 22:13

## 2025-05-16 RX ADMIN — SODIUM CHLORIDE 1000 ML: 9 INJECTION, SOLUTION INTRAVENOUS at 23:13

## 2025-05-16 ASSESSMENT — PAIN DESCRIPTION - LOCATION: LOCATION: ARM;CHEST

## 2025-05-16 ASSESSMENT — PAIN SCALES - GENERAL
PAINLEVEL_OUTOF10: 8
PAINLEVEL_OUTOF10: 5

## 2025-05-16 ASSESSMENT — PAIN - FUNCTIONAL ASSESSMENT: PAIN_FUNCTIONAL_ASSESSMENT: 0-10

## 2025-05-16 ASSESSMENT — PAIN DESCRIPTION - DESCRIPTORS: DESCRIPTORS: STABBING

## 2025-05-17 VITALS
DIASTOLIC BLOOD PRESSURE: 91 MMHG | SYSTOLIC BLOOD PRESSURE: 136 MMHG | RESPIRATION RATE: 20 BRPM | OXYGEN SATURATION: 96 % | WEIGHT: 260 LBS | HEART RATE: 102 BPM | HEIGHT: 63 IN | TEMPERATURE: 100 F | BODY MASS INDEX: 46.07 KG/M2

## 2025-05-17 NOTE — ED PROVIDER NOTES
[facility] Emergency Department  [address]  [facility] [address]  Phone: [phone]  Fax: [phone]      Attending Physician Attestation    I personally made/approve the management plan for this patient's and take responsibility for the patient management.      CHIEF COMPLAINT       Chief Complaint   Patient presents with    Fever     Pts son has the flu. She started last night with flu like symptoms. Pt had surgery on her left arm for ulnar nerve transposition. She states the surgery and being sick has made her autonomic dysfunction worse. Pt states her incision from her surgery looks red and feels warm.     Cough    Nausea    Headache    Chest Pain        PAST MEDICAL HISTORY     Past Medical History:   Diagnosis Date    Anxiety     bipolar    Back pain     herniated disc    Fibromyalgia 04/28/2025    Hyperlipidemia     Hypertension     no medications, states it is only high at dr chakraborty    Kidney infection     Kidney stone     Migraines     Obesity     Ovarian cyst     Prediabetes     on Metformin    Prolonged emergence from general anesthesia     states has woken up during surgery in past (hyst)    Syncope        SURGICAL HISTORY      has a past surgical history that includes Tubal ligation (2012); Dilation and curettage of uterus; Endometrial ablation (2018); Hysterectomy (2018); Ulnar nerve repair (10/2022); Shoulder arthroscopy (Left, 04/19/2023); and Shoulder arthroscopy (Left, 04/19/2023).    CURRENT MEDICATIONS       Previous Medications    ASPIRIN 81 MG CHEWABLE TABLET    Take 1 tablet by mouth daily    BLOOD PRESSURE KIT    Monitor blood pressure twice daily    CYCLOBENZAPRINE (FLEXERIL) 10 MG TABLET    TAKE 1 TABLET BY MOUTH AS NEEDED IN THE MORNING AND 1 AT NOON AND 1 AT BEDTIME FOR MUSCLE SPASM FOR UP TO 2O DAYS    DICYCLOMINE (BENTYL) 10 MG CAPSULE    Take 1 capsule by mouth 4 times daily (before meals and nightly)    DOCUSATE SODIUM (COLACE) 100 MG CAPSULE    Take 1  bilateral mastectomy, chemo, rt    Ovarian Cancer Mother 30 - 39        hysterectomy, chemo    Uterine Cancer Mother 30 - 39    Allergy (Severe) Mother         Bees, codeine    Anemia Mother     Miscarriages / Stillbirths Mother     Obesity Mother     Cervical Cancer Mother     Stroke Father     Heart Attack Father     Liver Cancer Father 40 - 49    Breast Cancer Half-Sister     Stroke Half-Sister     Thyroid Disease Half-Sister     Learning Disabilities Brother     Breast Cancer Maternal Grandmother 40 - 49    Stroke Maternal Grandmother     Heart Attack Maternal Grandmother     Vision Loss Maternal Grandmother     Colon Cancer Paternal Grandfather 40 - 49    Heart Disease Paternal Grandfather     Stroke Paternal Grandfather     Breast Cancer Maternal Aunt 20 - 29        d.42 brain ca    Miscarriages / Stillbirths Maternal Aunt     Breast Cancer Maternal Aunt 30 - 39        had a second breast ca in her 40s    Clotting Disorder Maternal Aunt     Obesity Maternal Aunt     Breast Cancer Paternal Aunt 30 - 39    Crohn's Disease Maternal Cousin     Crohn's Disease Paternal Cousin        SOCIAL HISTORY      reports that she has never smoked. She has been exposed to tobacco smoke. She has never used smokeless tobacco. She reports current drug use. Drug: Marijuana (Weed). She reports that she does not drink alcohol.     VITALS   INITIAL VITALS: BP (!) 136/91   Pulse (!) 109   Temp 100 °F (37.8 °C) (Oral)   Resp 20   Ht 1.6 m (5' 3\")   Wt 117.9 kg (260 lb)   LMP 09/16/2017   SpO2 96%   BMI 46.06 kg/m²     DIAGNOSTIC RESULTS     EKG: All EKG's are interpreted by the Emergency Department Physician who either signs or Co-signs this chart in the absence of a cardiologist.  Please see dictation below in ED course.    LABS:  The following laboratory tests were ordered, reviewed, and interpreted by myself if any were done:   Results for orders placed or performed during the hospital encounter of 05/16/25   COVID-19, Rapid

## 2025-05-17 NOTE — ED PROVIDER NOTES
[facility] Emergency Department  [address]  [place] [address]  Phone: [phone]  Fax: [phone]      Patient Name:  Thania Taylor  Medical Record Number:  [id number]  YOB: 1985  Date of Service:  5/16/2025  Primary Care Physician:  Juan Rizo PA-C      CHIEF COMPLAINT:       Chief Complaint   Patient presents with    Fever     Pts son has the flu. She started last night with flu like symptoms. Pt had surgery on her left arm for ulnar nerve transposition. She states the surgery and being sick has made her autonomic dysfunction worse. Pt states her incision from her surgery looks red and feels warm.     Cough    Nausea    Headache    Chest Pain       HISTORY OF PRESENT ILLNESS:    Thania Taylor is a 39 y.o. female who presents with the complaint of fever headache body aches generalized not feeling well.  Has a reported history of dysautonomia however cannot find thing in her history for that.  She did recently have a left ulnar compression relief surgery.  He is concerned that the site may be infected.  Her son also recently tested positive for influenza A.  She has heart rate at 125.  Temp is 100 °F.    CURRENT MEDICATIONS:      Discharge Medication List as of 5/16/2025 11:54 PM        CONTINUE these medications which have NOT CHANGED    Details   cyclobenzaprine (FLEXERIL) 10 MG tablet TAKE 1 TABLET BY MOUTH AS NEEDED IN THE MORNING AND 1 AT NOON AND 1 AT BEDTIME FOR MUSCLE SPASM FOR UP TO 2O DAYSHistorical Med      Ubrogepant (UBRELVY) 100 MG TABS Take 100 mg by mouth daily as needed (1 tab at onset of migraine and repeat after 2 hours if needed), Disp-16 tablet, R-5Normal      docusate sodium (COLACE) 100 MG capsule Take 1 capsule by mouth 2 times dailyHistorical Med      pantoprazole (PROTONIX) 40 MG tablet Take 1 tablet by mouth dailyHistorical Med      PEG 3350-KCl-NaBcb-NaCl-NaSulf (PEG-3350/ELECTROLYTES) 236 g SOLR USE AS DIRECTED PER PHYSICIAN    Medications    acetaminophen (TYLENOL) tablet 1,000 mg    sodium chloride 0.9 % bolus 1,000 mL    ondansetron (ZOFRAN-ODT) 4 MG disintegrating tablet     Sig: Take 1 tablet by mouth every 8 hours as needed for Nausea or Vomiting     Dispense:  10 tablet     Refill:  0       DIAGNOSTIC RESULTS:       DEPARTMENT VITAL SIGNS:     Vitals:    05/16/25 2111 05/16/25 2313 05/16/25 2314 05/16/25 2354   BP: (!) 136/91      Pulse: (!) 125  (!) 109 (!) 102   Resp: 20      Temp: 100 °F (37.8 °C)      TempSrc: Oral      SpO2: 96% 96%     Weight: 117.9 kg (260 lb)      Height: 1.6 m (5' 3\")        BP: (!) 136/91, Temp: 100 °F (37.8 °C), Pulse: (!) 102, Respirations: 20     DISPOSITION NOTE:   39-year-old female presented to the ER with complaint of bodyaches fever cough nausea headaches.  Slightly tachycardic at 102 history of POTS.  Son recently diagnosed with influenza A.  Currently afebrile /91.  She does appear to be shivering.  Temp is 100.  Ordered a dose of Tylenol for her.  Ordered a rapid influenza and COVID which were both negative.  A chest x-ray which was negative for any acute cardiopulmonary process.  She did receive a liter of normal saline.  After the fluids she reported that she was feeling better.  Discussed alternating Tylenol Motrin and sent in a prescription for some Zofran for any ongoing nausea.  With strict follow-up precautions with her primary care physician as well as as to return the ER for any worsening symptoms or concerns.    FINAL IMPRESSION:     1. Viral illness        DISPOSITION:   Decision To Discharge    PATIENT REFERRED TO:   Juan Rizo PA-C  [facility]  [place] [id number] [phone]    Call in 2 days        DISCHARGE MEDICATIONS:     Discharge Medication List as of 5/16/2025 11:54 PM            NITA Vasquez NP   (Please note that portions of this note were completed with a voice recognition program.  Efforts were made to edit the dictations

## 2025-06-02 ENCOUNTER — HOSPITAL ENCOUNTER (OUTPATIENT)
Dept: NEUROLOGY | Age: 40
Discharge: HOME | End: 2025-06-02

## 2025-06-17 ENCOUNTER — HOSPITAL ENCOUNTER (OUTPATIENT)
Dept: NEUROLOGY | Age: 40
Discharge: HOME | End: 2025-06-17
Payer: COMMERCIAL

## 2025-06-17 DIAGNOSIS — R55 SYNCOPE, UNSPECIFIED SYNCOPE TYPE: ICD-10-CM

## 2025-06-17 DIAGNOSIS — G90.1 DYSAUTONOMIA (HCC): ICD-10-CM

## 2025-06-17 PROCEDURE — 95816 EEG AWAKE AND DROWSY: CPT

## 2025-06-18 ENCOUNTER — RESULTS FOLLOW-UP (OUTPATIENT)
Dept: NEUROLOGY | Age: 40
End: 2025-06-18

## 2025-06-18 NOTE — PROCEDURES
EEG REPORT       Patient: Thania Taylor Age: 39 y.o.  MRN: [id number]      Referring Provider: Shabana Johnson PA    History: This routine 30 minute scalp EEG was recorded with video- monitoring for a 39 y.o.. female  who presented with encephalopathy. This EEG was performed to evaluate for focal and epileptiform abnormalities.     Thania Taylor   Current Outpatient Medications   Medication Sig Dispense Refill    midodrine (PROAMATINE) 5 MG tablet Take 1 tablet by mouth 3 times daily 90 tablet 3    HYDROcodone-acetaminophen (NORCO) 5-325 MG per tablet TAKE 1 TABLET BY MOUTH EVERY 6 HOURS AS NEEDED FOR SEVERE PAIN (8-10 PAIN SCORE) FOR UP TO 5 DAYS      ondansetron (ZOFRAN-ODT) 4 MG disintegrating tablet Take 1 tablet by mouth every 8 hours as needed for Nausea or Vomiting 10 tablet 0    cyclobenzaprine (FLEXERIL) 10 MG tablet TAKE 1 TABLET BY MOUTH AS NEEDED IN THE MORNING AND 1 AT NOON AND 1 AT BEDTIME FOR MUSCLE SPASM FOR UP TO 2O DAYS      Ubrogepant (UBRELVY) 100 MG TABS Take 100 mg by mouth daily as needed (1 tab at onset of migraine and repeat after 2 hours if needed) 16 tablet 5    docusate sodium (COLACE) 100 MG capsule Take 1 capsule by mouth as needed      pantoprazole (PROTONIX) 40 MG tablet Take 1 tablet by mouth daily      PEG 3350-KCl-NaBcb-NaCl-NaSulf (PEG-3350/ELECTROLYTES) 236 g SOLR USE AS DIRECTED PER PHYSICIAN INSTRUCTION      polyethylene glycol (GLYCOLAX) 17 GM/SCOOP powder MIX 17 GRAMS OF POWDER IN 4-8 OUNCES OF LIQUID AND DRINK TWICE AS DIRECTED      senna-docusate (PERICOLACE) 8.6-50 MG per tablet Take 2 tablets by mouth nightly      Fremanezumab-vfrm (AJOVY) 225 MG/1.5ML SOAJ Inject 1.5 mLs into the skin every 30 days 1.5 mL 2    Nutritional Supplements (NUTRITIONAL SHAKE HIGH PROTEIN) LIQD Drink 3 times daily by mouth 90 each 2    dicyclomine (BENTYL) 10 MG capsule Take 1 capsule by mouth 4 times daily (before meals and nightly) (Patient taking differently: Take 1 capsule by mouth as

## 2025-06-30 ENCOUNTER — APPOINTMENT (OUTPATIENT)
Dept: NEUROLOGY | Facility: CLINIC | Age: 40
End: 2025-06-30
Payer: COMMERCIAL

## 2025-07-02 ENCOUNTER — OFFICE VISIT (OUTPATIENT)
Dept: SURGERY | Age: 40
End: 2025-07-02
Payer: COMMERCIAL

## 2025-07-02 VITALS
WEIGHT: 262 LBS | BODY MASS INDEX: 46.42 KG/M2 | SYSTOLIC BLOOD PRESSURE: 135 MMHG | DIASTOLIC BLOOD PRESSURE: 92 MMHG | HEIGHT: 63 IN | HEART RATE: 77 BPM

## 2025-07-02 DIAGNOSIS — Z91.89 AT HIGH RISK FOR BREAST CANCER: Primary | ICD-10-CM

## 2025-07-02 PROCEDURE — 99203 OFFICE O/P NEW LOW 30 MIN: CPT | Performed by: PLASTIC SURGERY

## 2025-07-02 PROCEDURE — 1036F TOBACCO NON-USER: CPT | Performed by: PLASTIC SURGERY

## 2025-07-02 PROCEDURE — G8417 CALC BMI ABV UP PARAM F/U: HCPCS | Performed by: PLASTIC SURGERY

## 2025-07-02 PROCEDURE — G8427 DOCREV CUR MEDS BY ELIG CLIN: HCPCS | Performed by: PLASTIC SURGERY

## 2025-07-02 NOTE — PROGRESS NOTES
BREAST WORKSHEET     Weight: Weight - Scale: 118.8 kg (262 lb)   Height: Height: 160 cm (5' 3\")    BMI: Body mass index is 46.41 kg/m².     Marital Status:        [ ] S       [ x] M       [ ] D        [ ] W  How many pregnancies: 16  Children: 2  Planning to breast feed in the future? No    PATIENT HISTORY  History of Breast Cancer:      [ ] Yes       [ x] No      [ ] Right    [ ] Left     [ ] Bilateral  Last Mammogram: 2/2025       Where: [facility]  Family History of Breast Cancer?    [ x] Yes        [ ] No     Breast Masses:       [x ] Right     [x ] Left     Year:  Breast Biopsies:                 [ x] Right     [x ] Left     Year:  Previous Breast Surgeries:      [ x] Yes       [ ] No       Year:             Type:   Mastectomy:        [x ] Right     [x ] Left     Year:  Lumpectomy:        [ ] Right     [ ] Left     Year:  Radiation Treatment:       [ ] Yes       [ ] No       Year:               Where?        Doctor?   Miscellaneous:       [ ] Fibrocystic changes            [ ] Systemic Disease   [ ] Mastalgia                              [ ] Diabetes  Bleeding Problems:        [ ] Yes       [ x] No        Type:        Allergies:   Allergies as of 07/02/2025 - Fully Reviewed 07/02/2025   Allergen Reaction Noted    Latex  07/03/2014    Imitrex [sumatriptan] Anaphylaxis 07/03/2014    Propranolol Anaphylaxis 07/02/2025    Zoloft [sertraline hcl] Anaphylaxis 07/03/2014    Zomig [zolmitriptan] Anaphylaxis 07/03/2014    Caplyta [lumateperone]  01/05/2024    Fibrinolysin  05/05/2025    Geodon [ziprasidone]  01/12/2023    Lybalvi [olanzapine-samidorphan]  02/13/2025    Metoprolol Itching 05/16/2025    Midodrine  06/27/2025    Other Other (See Comments) 04/28/2025    Pcn [penicillins] Other (See Comments) 05/16/2019    Pristiq [desvenlafaxine]  02/13/2025    Remeron [mirtazapine]  02/13/2025    Seroquel [quetiapine]  02/13/2025    Viibryd [vilazodone hcl]  02/13/2025    Wellbutrin [bupropion]  02/13/2025    Ziprasidone 
pain  Hematological: Does not bruise/bleed easily.   Psychiatric/Behavioral: Patient has a history of anxiety and bipolar disorder  : Patient has a history of kidney stones  Endocrine: Patient has a history of prediabetes  Past Medical History:   Diagnosis Date    Anxiety     bipolar    Back pain     herniated disc    Fibromyalgia 04/28/2025    Hyperlipidemia     Hypertension     no medications, states it is only high at dr chakraborty    Kidney infection     Kidney stone     Migraines     Obesity     Ovarian cyst     Prediabetes     on Metformin    Prolonged emergence from general anesthesia     states has woken up during surgery in past (hyst)    Syncope      Past Surgical History:   Procedure Laterality Date    DILATION AND CURETTAGE OF UTERUS      ENDOMETRIAL ABLATION  2018    HYSTERECTOMY (CERVIX STATUS UNKNOWN)  2018    SHOULDER ARTHROSCOPY Left 04/19/2023    LEFT SHOULDER ARTHROSCOPIC SUBACROMIAL DECOMPRESSION    SHOULDER ARTHROSCOPY Left 04/19/2023    LEFT SHOULDER diagnostic ARTHROSCOPIC SUBACROMIAL DECOMPRESSION performed by Tyra Palacios MD at [facility] OR    TUBAL LIGATION  2012    ULNAR NERVE REPAIR  10/2022    decompression     Social History     Socioeconomic History    Marital status:      Spouse name: Not on file    Number of children: Not on file    Years of education: Not on file    Highest education level: Not on file   Occupational History    Not on file   Tobacco Use    Smoking status: Never     Passive exposure: Current    Smokeless tobacco: Never    Tobacco comments:     uses vape   Vaping Use    Vaping status: Every Day    Substances: Nicotine, THC, CBD, Flavoring   Substance and Sexual Activity    Alcohol use: No    Drug use: Yes     Types: Marijuana (Weed)     Comment: on occasion    Sexual activity: Yes     Partners: Male   Other Topics Concern    Not on file   Social History Narrative    Not on file     Social Drivers of Health     Financial Resource Strain: Low Risk

## 2025-07-10 ENCOUNTER — TELEMEDICINE (OUTPATIENT)
Dept: PRIMARY CARE CLINIC | Age: 40
End: 2025-07-10
Payer: COMMERCIAL

## 2025-07-10 DIAGNOSIS — R73.03 PREDIABETES: ICD-10-CM

## 2025-07-10 DIAGNOSIS — G90.A POTS (POSTURAL ORTHOSTATIC TACHYCARDIA SYNDROME): Primary | ICD-10-CM

## 2025-07-10 DIAGNOSIS — M35.7 MUSCULOSKELETAL HYPERMOBILITY: ICD-10-CM

## 2025-07-10 DIAGNOSIS — M79.7 FIBROMYALGIA: ICD-10-CM

## 2025-07-10 DIAGNOSIS — G90.1 DYSAUTONOMIA (HCC): ICD-10-CM

## 2025-07-10 PROCEDURE — G8427 DOCREV CUR MEDS BY ELIG CLIN: HCPCS | Performed by: PHYSICIAN ASSISTANT

## 2025-07-10 PROCEDURE — 99214 OFFICE O/P EST MOD 30 MIN: CPT | Performed by: PHYSICIAN ASSISTANT

## 2025-07-10 ASSESSMENT — ENCOUNTER SYMPTOMS
ABDOMINAL PAIN: 0
VOMITING: 0
DIARRHEA: 0
SHORTNESS OF BREATH: 0
RHINORRHEA: 0
NAUSEA: 0
COUGH: 0
CONSTIPATION: 0
BACK PAIN: 0
SINUS PAIN: 0

## 2025-07-10 ASSESSMENT — PATIENT HEALTH QUESTIONNAIRE - PHQ9
2. FEELING DOWN, DEPRESSED OR HOPELESS: SEVERAL DAYS
SUM OF ALL RESPONSES TO PHQ QUESTIONS 1-9: 15
10. IF YOU CHECKED OFF ANY PROBLEMS, HOW DIFFICULT HAVE THESE PROBLEMS MADE IT FOR YOU TO DO YOUR WORK, TAKE CARE OF THINGS AT HOME, OR GET ALONG WITH OTHER PEOPLE: VERY DIFFICULT
6. FEELING BAD ABOUT YOURSELF - OR THAT YOU ARE A FAILURE OR HAVE LET YOURSELF OR YOUR FAMILY DOWN: SEVERAL DAYS
SUM OF ALL RESPONSES TO PHQ QUESTIONS 1-9: 15
4. FEELING TIRED OR HAVING LITTLE ENERGY: NEARLY EVERY DAY
3. TROUBLE FALLING OR STAYING ASLEEP: NEARLY EVERY DAY
8. MOVING OR SPEAKING SO SLOWLY THAT OTHER PEOPLE COULD HAVE NOTICED. OR THE OPPOSITE, BEING SO FIGETY OR RESTLESS THAT YOU HAVE BEEN MOVING AROUND A LOT MORE THAN USUAL: NOT AT ALL
SUM OF ALL RESPONSES TO PHQ QUESTIONS 1-9: 15
SUM OF ALL RESPONSES TO PHQ QUESTIONS 1-9: 15
1. LITTLE INTEREST OR PLEASURE IN DOING THINGS: SEVERAL DAYS
9. THOUGHTS THAT YOU WOULD BE BETTER OFF DEAD, OR OF HURTING YOURSELF: NOT AT ALL
5. POOR APPETITE OR OVEREATING: NEARLY EVERY DAY
7. TROUBLE CONCENTRATING ON THINGS, SUCH AS READING THE NEWSPAPER OR WATCHING TELEVISION: NEARLY EVERY DAY

## 2025-07-10 NOTE — PROGRESS NOTES
7/10/2025    TELEHEALTH EVALUATION -- Audio/Visual    HPI:    Thania Taylor (:  1985) has requested an audio/video evaluation for the following concern(s):    Patient presents as a virtual visit to discuss her extensive history.  She has followed with significant specialist over the last 6 months.  She is following with rheumatology, cardiology, neurology, psychiatry, orthopedics, PMNR, allergist.  She was recently diagnosed with mast cell activation, idiopathic urticaria.  She is following closely with allergist.  She recently had elbow surgery for ulnar nerve with orthopedics.  Furthermore, her biggest concern at this time is possible EDS.  She is frustrated with her rheumatologist as he will need to an evaluation or send her to  for evaluation of EDS.  She admits to hypermobility, polyarthralgia, myalgias, weakness in the arms.  There is no family history, but she is still concerned.  Additionally, she is following closely with cardiology for management of POTS/dysautonomia.  She has been tried on many medications including midodrine, Florinef.  She is trying to stay well-hydrated although it is difficult at times.  She has even seen [facility], though she felt that she was dismissed at this office.    Patient also is frustrated with her weight.  She has a difficulty gaining weight last few years.  She attributes this to medication and inactivity given her chronic diseases.  She has been on Adipex with very minimal benefit.  She would like to see if her insurance will cover GLP-1.  Will recheck A1c to see if this is any different.  Likely insurance will not cover GLP-1 without diagnosis of diabetes.      Review of Systems   Constitutional:  Positive for fatigue. Negative for chills and fever.   HENT:  Negative for congestion, rhinorrhea and sinus pain.    Respiratory:  Negative for cough and shortness of breath.    Cardiovascular:  Negative for chest pain and leg swelling.

## 2025-07-11 ENCOUNTER — TELEPHONE (OUTPATIENT)
Age: 40
End: 2025-07-11

## 2025-07-16 ENCOUNTER — INITIAL CONSULT (OUTPATIENT)
Dept: PAIN MANAGEMENT | Age: 40
End: 2025-07-16
Payer: COMMERCIAL

## 2025-07-16 VITALS — HEIGHT: 63 IN | BODY MASS INDEX: 46.42 KG/M2 | WEIGHT: 262 LBS

## 2025-07-16 DIAGNOSIS — G56.22 LESION OF LEFT ULNAR NERVE: Primary | ICD-10-CM

## 2025-07-16 PROCEDURE — G8417 CALC BMI ABV UP PARAM F/U: HCPCS | Performed by: PAIN MEDICINE

## 2025-07-16 PROCEDURE — 99203 OFFICE O/P NEW LOW 30 MIN: CPT | Performed by: PAIN MEDICINE

## 2025-07-16 PROCEDURE — 1036F TOBACCO NON-USER: CPT | Performed by: PAIN MEDICINE

## 2025-07-16 PROCEDURE — G8427 DOCREV CUR MEDS BY ELIG CLIN: HCPCS | Performed by: PAIN MEDICINE

## 2025-07-16 NOTE — PROGRESS NOTES
Clotting Disorder Maternal Aunt     Obesity Maternal Aunt     Breast Cancer Paternal Aunt 30 - 39    Crohn's Disease Maternal Cousin     Crohn's Disease Paternal Cousin     Learning Disabilities Sister     Breast Cancer Maternal Aunt        Social History     Socioeconomic History    Marital status:      Spouse name: Not on file    Number of children: Not on file    Years of education: Not on file    Highest education level: Not on file   Occupational History    Not on file   Tobacco Use    Smoking status: Never     Passive exposure: Current    Smokeless tobacco: Never    Tobacco comments:     uses vape   Vaping Use    Vaping status: Every Day    Substances: Nicotine, THC, CBD, Flavoring   Substance and Sexual Activity    Alcohol use: No    Drug use: Yes     Types: Marijuana (Weed)     Comment: on occasion    Sexual activity: Yes     Partners: Male   Other Topics Concern    Not on file   Social History Narrative    Not on file     Social Drivers of Health     Financial Resource Strain: Low Risk  (8/15/2024)    Overall Financial Resource Strain (CARDIA)     Difficulty of Paying Living Expenses: Not hard at all   Recent Concern: Financial Resource Strain - Medium Risk (6/3/2024)    Overall Financial Resource Strain (CARDIA)     Difficulty of Paying Living Expenses: Somewhat hard   Food Insecurity: Food Insecurity Present (1/9/2025)    Hunger Vital Sign     Worried About Running Out of Food in the Last Year: Often true     Ran Out of Food in the Last Year: Often true   Transportation Needs: No Transportation Needs (1/9/2025)    PRAPARE - Transportation     Lack of Transportation (Medical): No     Lack of Transportation (Non-Medical): No   Physical Activity: Not on file   Stress: No Stress Concern Present (2/1/2023)    Received from [facility], [facility]    [employer] - Occupational Stress Questionnaire     Feeling of Stress : Not at all   Social

## 2025-07-17 ENCOUNTER — PATIENT MESSAGE (OUTPATIENT)
Dept: SURGERY | Age: 40
End: 2025-07-17

## 2025-07-17 ENCOUNTER — TELEPHONE (OUTPATIENT)
Dept: SURGERY | Age: 40
End: 2025-07-17

## 2025-07-22 ENCOUNTER — TELEPHONE (OUTPATIENT)
Dept: SURGERY | Age: 40
End: 2025-07-22

## 2025-07-22 NOTE — TELEPHONE ENCOUNTER
Patient called with some confusion regarding Dr. Corley leaving. Writer explained that Dr. Olivera will be joining this practice and that her combo surgery with Dr. Abad is still able to be scheduled with Dr. Olivera. Writer let patient know once Dr. Olivera's OR schedule is set that writer will contact patient to schedule surgery.

## 2025-07-22 NOTE — TELEPHONE ENCOUNTER
Spoke with the patient and explained that she needs to be set up with another surgeon for the mastectomies. I also explained that she is not a candidate for nipple sparing mastectomies due to her ptosis. She can be referred for another opinion. She declined.

## 2025-07-29 ENCOUNTER — OFFICE VISIT (OUTPATIENT)
Dept: NEUROLOGY | Age: 40
End: 2025-07-29
Payer: COMMERCIAL

## 2025-07-29 ENCOUNTER — TELEPHONE (OUTPATIENT)
Dept: NEUROLOGY | Age: 40
End: 2025-07-29

## 2025-07-29 VITALS
HEIGHT: 63 IN | WEIGHT: 278.2 LBS | SYSTOLIC BLOOD PRESSURE: 139 MMHG | HEART RATE: 92 BPM | BODY MASS INDEX: 49.29 KG/M2 | DIASTOLIC BLOOD PRESSURE: 88 MMHG

## 2025-07-29 DIAGNOSIS — R55 CONVULSIVE SYNCOPE: ICD-10-CM

## 2025-07-29 DIAGNOSIS — G90.1 DYSAUTONOMIA (HCC): Primary | ICD-10-CM

## 2025-07-29 DIAGNOSIS — G43.E01 CHRONIC MIGRAINE WITH AURA AND WITH STATUS MIGRAINOSUS, NOT INTRACTABLE: ICD-10-CM

## 2025-07-29 DIAGNOSIS — R55 SYNCOPE, UNSPECIFIED SYNCOPE TYPE: ICD-10-CM

## 2025-07-29 DIAGNOSIS — G43.419 INTRACTABLE HEMIPLEGIC MIGRAINE WITHOUT STATUS MIGRAINOSUS: ICD-10-CM

## 2025-07-29 PROCEDURE — G8417 CALC BMI ABV UP PARAM F/U: HCPCS | Performed by: PHYSICIAN ASSISTANT

## 2025-07-29 PROCEDURE — 1036F TOBACCO NON-USER: CPT | Performed by: PHYSICIAN ASSISTANT

## 2025-07-29 PROCEDURE — G8427 DOCREV CUR MEDS BY ELIG CLIN: HCPCS | Performed by: PHYSICIAN ASSISTANT

## 2025-07-29 PROCEDURE — 99214 OFFICE O/P EST MOD 30 MIN: CPT | Performed by: PHYSICIAN ASSISTANT

## 2025-07-29 NOTE — TELEPHONE ENCOUNTER
Shabana would like patient to be seen for Botox injections.    Please check insurance for prior authorization and schedule follow up. Thania is aware that Botox is only preformed at the [facility] location.

## 2025-08-01 ENCOUNTER — TELEPHONE (OUTPATIENT)
Dept: OBGYN CLINIC | Age: 40
End: 2025-08-01

## 2025-08-04 ENCOUNTER — OFFICE VISIT (OUTPATIENT)
Dept: SURGERY | Age: 40
End: 2025-08-04
Payer: COMMERCIAL

## 2025-08-04 VITALS
WEIGHT: 278 LBS | HEART RATE: 88 BPM | SYSTOLIC BLOOD PRESSURE: 151 MMHG | BODY MASS INDEX: 49.26 KG/M2 | DIASTOLIC BLOOD PRESSURE: 108 MMHG | HEIGHT: 63 IN

## 2025-08-04 DIAGNOSIS — Z91.89 AT HIGH RISK FOR BREAST CANCER: Primary | ICD-10-CM

## 2025-08-04 PROCEDURE — G8427 DOCREV CUR MEDS BY ELIG CLIN: HCPCS | Performed by: STUDENT IN AN ORGANIZED HEALTH CARE EDUCATION/TRAINING PROGRAM

## 2025-08-04 PROCEDURE — 1036F TOBACCO NON-USER: CPT | Performed by: STUDENT IN AN ORGANIZED HEALTH CARE EDUCATION/TRAINING PROGRAM

## 2025-08-04 PROCEDURE — 99215 OFFICE O/P EST HI 40 MIN: CPT | Performed by: STUDENT IN AN ORGANIZED HEALTH CARE EDUCATION/TRAINING PROGRAM

## 2025-08-04 PROCEDURE — G8417 CALC BMI ABV UP PARAM F/U: HCPCS | Performed by: STUDENT IN AN ORGANIZED HEALTH CARE EDUCATION/TRAINING PROGRAM

## 2025-08-04 ASSESSMENT — ENCOUNTER SYMPTOMS
STRIDOR: 0
WHEEZING: 0
RECTAL PAIN: 0
NAUSEA: 0
COUGH: 0
CHEST TIGHTNESS: 1
VOMITING: 0
COLOR CHANGE: 0
APNEA: 0
CONSTIPATION: 1
BACK PAIN: 0
BLOOD IN STOOL: 0
SHORTNESS OF BREATH: 0
DIARRHEA: 0
ANAL BLEEDING: 0
ABDOMINAL DISTENTION: 1
ABDOMINAL PAIN: 1

## 2025-08-05 ENCOUNTER — OFFICE VISIT (OUTPATIENT)
Dept: PAIN MANAGEMENT | Age: 40
End: 2025-08-05
Payer: COMMERCIAL

## 2025-08-05 VITALS — BODY MASS INDEX: 49.26 KG/M2 | WEIGHT: 278 LBS | HEIGHT: 63 IN

## 2025-08-05 DIAGNOSIS — G56.22 ULNAR NEUROPATHY AT ELBOW, LEFT: Primary | ICD-10-CM

## 2025-08-05 DIAGNOSIS — Z98.890 H/O DECOMPRESSION OF ULNAR NERVE: ICD-10-CM

## 2025-08-05 DIAGNOSIS — M79.602 LEFT ARM PAIN: ICD-10-CM

## 2025-08-05 PROCEDURE — 99214 OFFICE O/P EST MOD 30 MIN: CPT | Performed by: ANESTHESIOLOGY

## 2025-08-05 PROCEDURE — 1036F TOBACCO NON-USER: CPT | Performed by: ANESTHESIOLOGY

## 2025-08-05 PROCEDURE — G8427 DOCREV CUR MEDS BY ELIG CLIN: HCPCS | Performed by: ANESTHESIOLOGY

## 2025-08-05 PROCEDURE — G8417 CALC BMI ABV UP PARAM F/U: HCPCS | Performed by: ANESTHESIOLOGY

## 2025-08-05 ASSESSMENT — ENCOUNTER SYMPTOMS
RESPIRATORY NEGATIVE: 1
GASTROINTESTINAL NEGATIVE: 1

## 2025-08-12 ENCOUNTER — TELEPHONE (OUTPATIENT)
Dept: SURGERY | Age: 40
End: 2025-08-12

## 2025-08-12 PROBLEM — Z91.89 AT RISK FOR CANCER: Status: ACTIVE | Noted: 2025-08-12

## 2025-08-25 ENCOUNTER — HOSPITAL ENCOUNTER (OUTPATIENT)
Dept: VASCULAR LAB | Age: 40
Discharge: HOME | End: 2025-08-27
Payer: COMMERCIAL

## 2025-08-25 DIAGNOSIS — R55 CONVULSIVE SYNCOPE: ICD-10-CM

## 2025-08-25 DIAGNOSIS — R55 SYNCOPE, UNSPECIFIED SYNCOPE TYPE: ICD-10-CM

## 2025-08-25 LAB
VAS LEFT CCA DIST EDV: 28.1 CM/S
VAS LEFT CCA DIST PSV: 73.5 CM/S
VAS LEFT CCA PROX EDV: 22 CM/S
VAS LEFT CCA PROX PSV: 82.1 CM/S
VAS LEFT ECA EDV: 19.9 CM/S
VAS LEFT ECA PSV: 102.1 CM/S
VAS LEFT ICA DIST EDV: 34.5 CM/S
VAS LEFT ICA DIST PSV: 74.6 CM/S
VAS LEFT ICA PROX EDV: 40 CM/S
VAS LEFT ICA PROX PSV: 72.8 CM/S
VAS LEFT ICA/CCA PSV: 1 NO UNITS
VAS LEFT VERTEBRAL EDV: 24.4 CM/S
VAS LEFT VERTEBRAL PSV: 58.8 CM/S
VAS RIGHT CCA DIST EDV: 22 CM/S
VAS RIGHT CCA DIST PSV: 58.8 CM/S
VAS RIGHT CCA PROX EDV: 18.3 CM/S
VAS RIGHT CCA PROX PSV: 73.6 CM/S
VAS RIGHT ECA EDV: 16.3 CM/S
VAS RIGHT ECA PSV: 105.8 CM/S
VAS RIGHT ICA DIST EDV: 41.6 CM/S
VAS RIGHT ICA DIST PSV: 82.2 CM/S
VAS RIGHT ICA PROX EDV: 23 CM/S
VAS RIGHT ICA PROX PSV: 46 CM/S
VAS RIGHT ICA/CCA PSV: 1.4 NO UNITS
VAS RIGHT VERTEBRAL EDV: 22 CM/S
VAS RIGHT VERTEBRAL PSV: 61.3 CM/S

## 2025-08-25 PROCEDURE — 93880 EXTRACRANIAL BILAT STUDY: CPT | Performed by: SURGERY

## 2025-08-25 PROCEDURE — 93880 EXTRACRANIAL BILAT STUDY: CPT

## 2025-08-26 ENCOUNTER — OFFICE VISIT (OUTPATIENT)
Dept: PAIN MANAGEMENT | Age: 40
End: 2025-08-26
Payer: COMMERCIAL

## 2025-08-26 VITALS — HEIGHT: 63 IN | WEIGHT: 278 LBS | BODY MASS INDEX: 49.26 KG/M2

## 2025-08-26 DIAGNOSIS — M79.602 LEFT ARM PAIN: ICD-10-CM

## 2025-08-26 DIAGNOSIS — G56.22 ULNAR NEUROPATHY AT ELBOW, LEFT: Primary | ICD-10-CM

## 2025-08-26 PROCEDURE — 99213 OFFICE O/P EST LOW 20 MIN: CPT | Performed by: PAIN MEDICINE

## 2025-08-27 ENCOUNTER — TELEPHONE (OUTPATIENT)
Dept: NEUROLOGY | Age: 40
End: 2025-08-27

## (undated) DEVICE — SLING ARM L L17 1/2IN D8.5IN COT POLY DLX W/ SHLDR PD

## (undated) DEVICE — STERLING XTRASHARP SHAVER GREAT WHITE SHAVER BLADE, 4.2 MM: Brand: STERLING XTRASHARP SHAVER GREAT WHITE

## (undated) DEVICE — BUR SHV L13CM DIA5MM 8 FLUT OVL FLUSHCUT AGG COOLCUT

## (undated) DEVICE — DRAPE,SHOULDER,BEACH CHAIR,STERILE: Brand: MEDLINE

## (undated) DEVICE — Device

## (undated) DEVICE — PROBE ABLAT XL 90DEG ASPIR BPLR RF 1 PC ELECTRD ERGO HNDL

## (undated) DEVICE — SOLUTION IV IRRIG LACTATED RINGERS 3000ML 2B7487

## (undated) DEVICE — GOWN,SIRUS,POLYRNF,BRTHSLV,XL,30/CS: Brand: MEDLINE

## (undated) DEVICE — APPLICATOR MEDICATED 26 CC SOLUTION HI LT ORNG CHLORAPREP

## (undated) DEVICE — MHPB ARTHROSCOPY PACK: Brand: MEDLINE INDUSTRIES, INC.

## (undated) DEVICE — SUTURE NONABSORBABLE MONOFILAMENT 3-0 PS-1 18 IN BLK ETHILON 1663H

## (undated) DEVICE — TUBING, SUCTION, 9/32" X 20', STRAIGHT: Brand: MEDLINE INDUSTRIES, INC.

## (undated) DEVICE — GLOVE ORANGE PI 8   MSG9080

## (undated) DEVICE — BLADE SHV L13CM DIA4MM EXCALIBUR AGG COOLCUT

## (undated) DEVICE — YANKAUER,FLEXIBLE HANDLE,REGLR CAPACITY: Brand: MEDLINE INDUSTRIES, INC.

## (undated) DEVICE — SUTURE ETHLN SZ 3-0 L18IN NONABSORBABLE BLK L24MM PS-1 3/8 1663G

## (undated) DEVICE — T-MAX DISPOSABLE FACE MASK 8 PER BOX

## (undated) DEVICE — DRAPE,U/ SHT,SPLIT,PLAS,STERIL: Brand: MEDLINE

## (undated) DEVICE — PENCIL ES L3M BTTN SWCH HOLSTER W/ BLDE ELECTRD EDGE